# Patient Record
Sex: FEMALE | HISPANIC OR LATINO | Employment: UNEMPLOYED | URBAN - METROPOLITAN AREA
[De-identification: names, ages, dates, MRNs, and addresses within clinical notes are randomized per-mention and may not be internally consistent; named-entity substitution may affect disease eponyms.]

---

## 2017-07-18 ENCOUNTER — GYNECOLOGY VISIT (OUTPATIENT)
Dept: OBGYN | Facility: CLINIC | Age: 32
End: 2017-07-18
Payer: OTHER MISCELLANEOUS

## 2017-07-18 VITALS — BODY MASS INDEX: 26.58 KG/M2 | WEIGHT: 150 LBS | DIASTOLIC BLOOD PRESSURE: 62 MMHG | SYSTOLIC BLOOD PRESSURE: 90 MMHG

## 2017-07-18 DIAGNOSIS — R10.32 LEFT LOWER QUADRANT PAIN: ICD-10-CM

## 2017-07-18 DIAGNOSIS — N94.6 DYSMENORRHEA: ICD-10-CM

## 2017-07-18 PROCEDURE — 99203 OFFICE O/P NEW LOW 30 MIN: CPT | Mod: 25 | Performed by: OBSTETRICS & GYNECOLOGY

## 2017-07-18 PROCEDURE — 76830 TRANSVAGINAL US NON-OB: CPT | Performed by: OBSTETRICS & GYNECOLOGY

## 2017-07-18 NOTE — PROGRESS NOTES
Chief complaint; new patient    Poly Soto is a 32 y.o.  who presents complaining of one-year history of left lower quadrant pain sometimes sharp sometimes dull. 3 out of 10 on the pain scale. She also has pain during her periods but not necessarily worse. The patient denies pain during intercourse. She has history of previous  ×1    Past medical history negative  Past surgical history-negative      Review of systems; denies fever chills abdominal pain, denies chest pain shortness of breath or urinary symptoms  Past medical history-History reviewed. No pertinent past medical history.  Past surgical history-History reviewed. No pertinent past surgical history.  Allergies-Review of patient's allergies indicates no known allergies.  Medications-  Current Outpatient Prescriptions on File Prior to Visit   Medication Sig Dispense Refill   • polyethylene glycol 3350 (MIRALAX) Powder Take 17 g by mouth every day. 1 Bottle 0   • norethindrone (MICRONOR) 0.35 MG tablet Take 1 Tab by mouth every day. 1 Each 11   • docusate sodium (COLACE) 100 MG CAPS Take 100 mg by mouth 2 times a day. For constiparion      • ibuprofen (MOTRIN) 800 MG TABS Take 800 mg by mouth every 8 hours as needed for Mild Pain. For discomfort or cramping      • PRENATAL VITAMINS PO Take  by mouth.       No current facility-administered medications on file prior to visit.     Social history-  Social History     Social History   • Marital Status:      Spouse Name: N/A   • Number of Children: N/A   • Years of Education: N/A     Occupational History   • Not on file.     Social History Main Topics   • Smoking status: Never Smoker    • Smokeless tobacco: Not on file   • Alcohol Use: No   • Drug Use: No   • Sexual Activity:     Partners: Male     Other Topics Concern   • Not on file     Social History Narrative     Past Family History-no history of breast or ovarian cancer    Physical examination;  Alert and oriented x3  General a thin  well-developed well-nourished female in no apparent distress  Filed Vitals:    07/18/17 0918   BP: 90/62   Weight: 68.04 kg (150 lb)     Skin is warm and dry  Neck-supple  HEENT-head-atraumatic, normocephalic, EOMI, PERRLA  Cardiovascular-regular rate and rhythm, normal S1-S2, no murmurs or gallops  Lungs-clear to auscultation bilaterally  Back-negative CVA tenderness  Abdomen-nondistended positive bowel sounds soft nontender no masses or hepatosplenomegaly  Pelvic examination;  External genitalia-no visible lesions   Vagina-no blood or discharge  Cervix-no gross lesions  Uterus-normal size and shape, nontender  Adnexa without mass or tenderness  Extremities without cyanosis clubbing or edema  Neurologic exam grossly intact    Transvaginal ultrasound; as performed and read by myself; uterus measures 6.1 cm x 3.9 cm with 2.6 cm anterior fibroid no free pelvic fluid endometrial thickness 4.7 mm, normal-appearing bladder, right ovary measures 3.2 cm x 2.0 cm with small follicular cysts less than 1 cm, left ovary without masses    Impression;  Abdominal pain-left lower quadrant pain-no obvious gynecologic pathology    Plan;  Reassurance  Refer back to primary care physician for extended workup including GI etiology for her pain  Questions answered in detail    35  Minutes spent with the patient, 5 minutes spent performing ultrasound greater than 50% of the time spent on counseling and coordination of care. All questions answered in detail.

## 2017-07-18 NOTE — MR AVS SNAPSHOT
Poly Soto   2017 9:30 AM   Gynecology Visit   MRN: [id number]    Department:  [facility]   Dept Phone:  [phone]    Description:  Female : 1985   Provider:  Cooper Avila M.D.           Allergies as of 2017     No Known Allergies      You were diagnosed with     Left lower quadrant pain   [385194]       Dysmenorrhea   [625.3.ICD-9-CM]         Vital Signs     Blood Pressure Weight Last Menstrual Period             90/62 mmHg 68.04 kg (150 lb) 2017         Basic Information     Date Of Birth Sex Race Ethnicity Preferred Language    1985 Female  or   Origin (Guatemalan,Syrian,Moldovan,Hungarian, etc) Guatemalan      Problem List              ICD-10-CM Priority Class Noted - Resolved    Not immune to rubella Z78.9   2010 - Present    Supervision of normal first pregnancy Z34.00   2010 - Present    Threatened  labor O47.00   2010 - Present    Postpartum care and examination    2010 - Present      Health Maintenance     Patient has no pending health maintenance at this time      Current Immunizations     MMR Vaccine 2010  9:33 AM      Below and/or attached are the medications your provider expects you to take. Review all of your home medications and newly ordered medications with your provider and/or pharmacist. Follow medication instructions as directed by your provider and/or pharmacist. Please keep your medication list with you and share with your provider. Update the information when medications are discontinued, doses are changed, or new medications (including over-the-counter products) are added; and carry medication information at all times in the event of emergency situations     Allergies:  No Known Allergies          Medications  Valid as of: 2017 -  9:40 AM    Generic Name Brand Name Tablet Size Instructions for use    Docusate Sodium (Cap) COLACE 100 MG Take 100 mg by mouth 2 times a day. For  constiparion         Ibuprofen (Tab) MOTRIN 800 MG Take 800 mg by mouth every 8 hours as needed for Mild Pain. For discomfort or cramping         Norethindrone (Tab) MICRONOR 0.35 MG Take 1 Tab by mouth every day.        Polyethylene Glycol 3350 (Powder) MIRALAX  Take 17 g by mouth every day.        Prenatal Multivit-Min-Fe-FA   Take  by mouth.        .                 Medicines prescribed today were sent to:     [facility] [address] [place] [address] - [address] [id number] [facility] [address]    Phone: [phone] Fax: [phone]    Open 24 Hours?: No      Medication refill instructions:       If your prescription bottle indicates you have medication refills left, it is not necessary to call your provider’s office. Please contact your pharmacy and they will refill your medication.    If your prescription bottle indicates you do not have any refills left, you may request refills at any time through one of the following ways: The online [employer] system (except Urgent Care), by calling your provider’s office, or by asking your pharmacy to contact your provider’s office with a refill request. Medication refills are processed only during regular business hours and may not be available until the next business day. Your provider may request additional information or to have a follow-up visit with you prior to refilling your medication.   *Please Note: Medication refills are assigned a new Rx number when refilled electronically. Your pharmacy may indicate that no refills were authorized even though a new prescription for the same medication is available at the pharmacy. Please request the medicine by name with the pharmacy before contacting your provider for a refill.           [facility] Status: Patient Declined

## 2020-12-11 ENCOUNTER — HOSPITAL ENCOUNTER (OUTPATIENT)
Dept: RADIOLOGY | Facility: MEDICAL CENTER | Age: 35
End: 2020-12-11
Attending: PHYSICIAN ASSISTANT

## 2020-12-11 DIAGNOSIS — R10.2 PELVIC PAIN: ICD-10-CM

## 2021-01-27 ENCOUNTER — HOSPITAL ENCOUNTER (OUTPATIENT)
Dept: RADIOLOGY | Facility: MEDICAL CENTER | Age: 36
End: 2021-01-27
Attending: PHYSICIAN ASSISTANT

## 2021-01-27 DIAGNOSIS — N83.202 CYST OF LEFT OVARY: ICD-10-CM

## 2021-03-23 ENCOUNTER — HOSPITAL ENCOUNTER (EMERGENCY)
Facility: MEDICAL CENTER | Age: 36
End: 2021-03-24
Attending: EMERGENCY MEDICINE

## 2021-03-23 DIAGNOSIS — R10.2 PELVIC PAIN: ICD-10-CM

## 2021-03-23 DIAGNOSIS — R10.30 LOWER ABDOMINAL PAIN: ICD-10-CM

## 2021-03-23 LAB
ALBUMIN SERPL BCP-MCNC: 4.5 G/DL (ref 3.2–4.9)
ALBUMIN/GLOB SERPL: 1.4 G/DL
ALP SERPL-CCNC: 65 U/L (ref 30–99)
ALT SERPL-CCNC: 19 U/L (ref 2–50)
ANION GAP SERPL CALC-SCNC: 11 MMOL/L (ref 7–16)
AST SERPL-CCNC: 16 U/L (ref 12–45)
BASOPHILS # BLD AUTO: 0.5 % (ref 0–1.8)
BASOPHILS # BLD: 0.04 K/UL (ref 0–0.12)
BILIRUB SERPL-MCNC: <0.2 MG/DL (ref 0.1–1.5)
BUN SERPL-MCNC: 11 MG/DL (ref 8–22)
CALCIUM SERPL-MCNC: 9.7 MG/DL (ref 8.5–10.5)
CHLORIDE SERPL-SCNC: 103 MMOL/L (ref 96–112)
CO2 SERPL-SCNC: 24 MMOL/L (ref 20–33)
CREAT SERPL-MCNC: 0.74 MG/DL (ref 0.5–1.4)
EOSINOPHIL # BLD AUTO: 0.11 K/UL (ref 0–0.51)
EOSINOPHIL NFR BLD: 1.3 % (ref 0–6.9)
ERYTHROCYTE [DISTWIDTH] IN BLOOD BY AUTOMATED COUNT: 40.1 FL (ref 35.9–50)
GLOBULIN SER CALC-MCNC: 3.2 G/DL (ref 1.9–3.5)
GLUCOSE SERPL-MCNC: 147 MG/DL (ref 65–99)
HCG SERPL QL: NEGATIVE
HCT VFR BLD AUTO: 41.2 % (ref 37–47)
HGB BLD-MCNC: 14.2 G/DL (ref 12–16)
IMM GRANULOCYTES # BLD AUTO: 0.04 K/UL (ref 0–0.11)
IMM GRANULOCYTES NFR BLD AUTO: 0.5 % (ref 0–0.9)
LIPASE SERPL-CCNC: 35 U/L (ref 11–82)
LYMPHOCYTES # BLD AUTO: 2.79 K/UL (ref 1–4.8)
LYMPHOCYTES NFR BLD: 33.7 % (ref 22–41)
MCH RBC QN AUTO: 31.9 PG (ref 27–33)
MCHC RBC AUTO-ENTMCNC: 34.5 G/DL (ref 33.6–35)
MCV RBC AUTO: 92.6 FL (ref 81.4–97.8)
MONOCYTES # BLD AUTO: 0.68 K/UL (ref 0–0.85)
MONOCYTES NFR BLD AUTO: 8.2 % (ref 0–13.4)
NEUTROPHILS # BLD AUTO: 4.62 K/UL (ref 2–7.15)
NEUTROPHILS NFR BLD: 55.8 % (ref 44–72)
NRBC # BLD AUTO: 0 K/UL
NRBC BLD-RTO: 0 /100 WBC
PLATELET # BLD AUTO: 318 K/UL (ref 164–446)
PMV BLD AUTO: 10 FL (ref 9–12.9)
POTASSIUM SERPL-SCNC: 3.6 MMOL/L (ref 3.6–5.5)
PROT SERPL-MCNC: 7.7 G/DL (ref 6–8.2)
RBC # BLD AUTO: 4.45 M/UL (ref 4.2–5.4)
SODIUM SERPL-SCNC: 138 MMOL/L (ref 135–145)
WBC # BLD AUTO: 8.3 K/UL (ref 4.8–10.8)

## 2021-03-23 PROCEDURE — 99284 EMERGENCY DEPT VISIT MOD MDM: CPT

## 2021-03-23 PROCEDURE — 80053 COMPREHEN METABOLIC PANEL: CPT

## 2021-03-23 PROCEDURE — 36415 COLL VENOUS BLD VENIPUNCTURE: CPT

## 2021-03-23 PROCEDURE — 83690 ASSAY OF LIPASE: CPT

## 2021-03-23 PROCEDURE — 85025 COMPLETE CBC W/AUTO DIFF WBC: CPT

## 2021-03-23 PROCEDURE — 84703 CHORIONIC GONADOTROPIN ASSAY: CPT

## 2021-03-24 ENCOUNTER — APPOINTMENT (OUTPATIENT)
Dept: RADIOLOGY | Facility: MEDICAL CENTER | Age: 36
End: 2021-03-24
Attending: EMERGENCY MEDICINE

## 2021-03-24 VITALS
TEMPERATURE: 97.7 F | SYSTOLIC BLOOD PRESSURE: 102 MMHG | WEIGHT: 150 LBS | OXYGEN SATURATION: 99 % | DIASTOLIC BLOOD PRESSURE: 61 MMHG | HEIGHT: 63 IN | BODY MASS INDEX: 26.58 KG/M2 | RESPIRATION RATE: 14 BRPM | HEART RATE: 76 BPM

## 2021-03-24 LAB
APPEARANCE UR: CLEAR
BILIRUB UR QL STRIP.AUTO: NEGATIVE
COLOR UR: YELLOW
GLUCOSE UR STRIP.AUTO-MCNC: NEGATIVE MG/DL
KETONES UR STRIP.AUTO-MCNC: ABNORMAL MG/DL
LEUKOCYTE ESTERASE UR QL STRIP.AUTO: NEGATIVE
MICRO URNS: ABNORMAL
NITRITE UR QL STRIP.AUTO: NEGATIVE
PH UR STRIP.AUTO: 6.5 [PH] (ref 5–8)
PROT UR QL STRIP: NEGATIVE MG/DL
RBC UR QL AUTO: NEGATIVE
SP GR UR STRIP.AUTO: 1.02
UROBILINOGEN UR STRIP.AUTO-MCNC: 1 MG/DL

## 2021-03-24 PROCEDURE — 81003 URINALYSIS AUTO W/O SCOPE: CPT

## 2021-03-24 PROCEDURE — 76856 US EXAM PELVIC COMPLETE: CPT

## 2021-03-24 NOTE — ED NOTES
Pt continues to wait for U/S.  This RN apologized for wait to pt.  LEft VM for U/S requesting update about procedure time.

## 2021-03-24 NOTE — ED TRIAGE NOTES
Chief Complaint   Patient presents with   • Abdominal Pain     Pt walked into triage with a steady c/o LLQ pain after having a bowel movement approx 30 mins prior to arrival, denies N/V/D    Pt & staff masked and in appropriate PPE during encounter.  Pt denies fever/travel or being in contact with anyone testing positive for Covid.  Explained pt the triage process, made pt aware to tell the RN/staff of any changes/concerns, pt verbalized understanding of process and instructions given.  Pt to ER lobby.

## 2021-03-24 NOTE — ED NOTES
Pt is resting, currently say pain is tolerable.  Updated pt about plan for U/S.  Awaiting lab results at this time.

## 2021-03-24 NOTE — ED NOTES
Showed pt to room. Pt walked with steady gait. Pt was gowned, connected to cardiac monitor, Spo2, and BP.

## 2021-03-24 NOTE — ED PROVIDER NOTES
ED Provider Note    CHIEF COMPLAINT  Chief Complaint   Patient presents with   • Abdominal Pain      services were used in the patient's primary language of Malay.     Name or Number: [id number]  Mode of interpretation: iPad    Content of Interpretation: as follows.      HPI  Poly Soto is a 36 y.o. female who presents suprapubic and left lower quadrant pain that started suddenly this evening.  She states that the pain radiates down her leg on the left.  No bulging in the groin region.  No skin changes.  No associated vomiting.  No dysuria or hematuria.  No vaginal bleeding or discharge.  No bloody stool or black stool.  Denies prior abdominal surgeries in the past.  No fever.  No chest pain or trouble breathing..    I was able to review patient's prior imaging studies including multiple ultrasound of the pelvis.  Has history of ovarian cysts in the past.    Last menstrual cycle was on the 15th.  She tends to have cycles lasting 2 or 3 days with light bleeding though significant pain.  She denies taking birth control pills currently.    REVIEW OF SYSTEMS  See HPI for further details. All other systems are negative.     PAST MEDICAL HISTORY       SOCIAL HISTORY  Social History     Tobacco Use   • Smoking status: Never Smoker   Substance and Sexual Activity   • Alcohol use: No   • Drug use: No   • Sexual activity: Yes     Partners: Male       SURGICAL HISTORY  patient denies any surgical history    CURRENT MEDICATIONS  Home Medications     Reviewed by Fernanda Banks R.N. (Registered Nurse) on 03/23/21 at 2044  Med List Status: Not Addressed   Medication Last Dose Status   docusate sodium (COLACE) 100 MG CAPS  Active   ibuprofen (MOTRIN) 800 MG TABS  Active   norethindrone (MICRONOR) 0.35 MG tablet  Active   polyethylene glycol 3350 (MIRALAX) Powder  Active   PRENATAL VITAMINS PO  Active                ALLERGIES  No Known Allergies    PHYSICAL EXAM  VITAL SIGNS: /70   Pulse 80    "Temp 36.2 °C (97.2 °F) (Temporal)   Resp 14   Ht 1.6 m (5' 3\")   Wt 68 kg (150 lb)   SpO2 (!) 80%   BMI 26.57 kg/m²   Pulse ox interpretation: I interpret this pulse ox as normal.  Constitutional: Alert in no apparent distress.  HENT: No signs of trauma, Bilateral external ears normal, Nose normal.   Eyes: Pupils are equal and reactive, Conjunctiva normal, Non-icteric.   Neck: Normal range of motion, No tenderness, Supple, No stridor.   Cardiovascular: Regular rate and rhythm.   Thorax & Lungs: Normal breath sounds, No respiratory distress, No wheezing, No chest tenderness.   Abdomen: Bowel sounds normal, Soft, faint suprapubic tenderness, No masses, No pulsatile masses. No peritoneal signs.  No inguinal masses or hernias.  Skin: Warm, Dry, No erythema, No rash.   Back: No bony tenderness, No CVA tenderness.   Extremities: Intact distal pulses, No edema, No tenderness, No cyanosis  Neurologic: Alert, No focal deficits noted.       DIAGNOSTIC STUDIES / PROCEDURES      LABS  Labs Reviewed   COMP METABOLIC PANEL - Abnormal; Notable for the following components:       Result Value    Glucose 147 (*)     All other components within normal limits   URINALYSIS,CULTURE IF INDICATED - Abnormal; Notable for the following components:    Ketones Trace (*)     All other components within normal limits    Narrative:     Indication for culture:->Patient WITHOUT an indwelling Atkins  catheter in place with new onset of Dysuria, Frequency,  Urgency, and/or Suprapubic pain   CBC WITH DIFFERENTIAL   LIPASE   HCG QUAL SERUM   ESTIMATED GFR         RADIOLOGY  US-PELVIC COMPLETE (TRANSABDOMINAL/TRANSVAGINAL) (COMBO)   Final Result      1.  Several uterine fibroids measuring up to 1.9 cm. No submucosal fibroids are detected.   2.  Normal endometrial thickness.   3.  Normal ovaries.   4.  No pelvic free fluid.            COURSE & MEDICAL DECISION MAKING    Medications - No data to display    Pertinent Labs & Imaging studies reviewed. " (See chart for details)  36 y.o. female presenting with left lower quadrant and suprapubic pain starting suddenly this evening.  No associated vomiting.  No fevers.  No bulges to the region to suggest hernia.  No skin changes.  No vaginal bleeding or discharge.  The patient is not pregnant.  No diarrhea or bloody or black stool.  Low suspicion for diverticulitis.  Given the patient's prior history of ovarian cysts, ultrasound pelvis was ordered for further evaluation along with laboratory studies.    Laboratory studies were largely unremarkable.  No leukocytosis.  No significant metabolic abnormalities.  No evidence of urinary tract infection/hematuria.  Low suspicion for nephrolithiasis causing the patient's left lower quadrant pain/suprapubic pain.    Pelvic ultrasound was performed.  Several uterine fibroids were identified which may result in abnormal pelvic pain.  No free pelvic fluid.  No evidence of ovarian cysts or torsion.    Upon reevaluation, the patient states that her pain is much improved despite the fact that she did not receive analgesic medications here in the emergency department.  Hemodynamically stable.  All results were reviewed with the patient using a  service.  Laboratory studies are unremarkable.  Ultrasound showing uterine fibroids which may cause pain.  No evidence of ovarian cysts.  With suspect free fluid in the abdomen if there was a ruptured ovarian cyst.  No evidence of hernia such as inguinal/femoral hernia at this time.    Patient appears stable for discharge.  Recommending outpatient follow-up with a primary care physician and gynecologist for further work-up of the patient's abdominal discomfort.  To return immediately for any worsening of her symptoms or development of any other concerning signs or symptoms.  In the meantime, recommending acetaminophen and/or ibuprofen for continued pain management over the next few days.    The patient was instructed to follow-up with  "primary care physician for further management.  To return immediately for any worsening symptoms or development of any other concerning signs or symptoms. The patient verbalizes understanding in their own words.    /61   Pulse 76   Temp 36.5 °C (97.7 °F) (Temporal)   Resp 14   Ht 1.6 m (5' 3\")   Wt 68 kg (150 lb)   SpO2 99%   BMI 26.57 kg/m²     The patient was referred to primary care where they will receive further BP management.      [facility], Emergency Dept  [address] [place] [id number] [phone]    As needed, If symptoms worsen    Primary care doctor    Schedule an appointment as soon as possible for a visit         FINAL IMPRESSION  1. Pelvic pain    2. Lower abdominal pain            Electronically signed by: Hamlet Coates M.D., 3/23/2021 10:47 PM    "

## 2021-03-24 NOTE — ED NOTES
Pt resting comfortably, states pain is tolerable.  Apologized for wait times.  Awaiting U/S result to be completed.

## 2023-03-13 ENCOUNTER — GYNECOLOGY VISIT (OUTPATIENT)
Dept: OBGYN | Facility: CLINIC | Age: 38
End: 2023-03-13
Payer: COMMERCIAL

## 2023-03-13 VITALS
DIASTOLIC BLOOD PRESSURE: 70 MMHG | SYSTOLIC BLOOD PRESSURE: 104 MMHG | BODY MASS INDEX: 28.84 KG/M2 | HEIGHT: 63 IN | WEIGHT: 162.8 LBS

## 2023-03-13 DIAGNOSIS — R10.2 PELVIC PAIN: ICD-10-CM

## 2023-03-13 PROCEDURE — 99203 OFFICE O/P NEW LOW 30 MIN: CPT | Performed by: OBSTETRICS & GYNECOLOGY

## 2023-03-13 ASSESSMENT — FIBROSIS 4 INDEX: FIB4 SCORE: 0.43

## 2023-03-13 NOTE — PROGRESS NOTES
GYN Consult    CC/reason for consult: pelvic pain    HPI: Poly Soto is a 38 y.o.  with pelvic pain. States her pain is suprapubic and wraps around her right side. States the pain is worse after her period. She is currently on an OCP. States the birth control helped with her pain for a while but it is no longer working. No known family hx of endometriosis.     ROS:  Constitutional: No fever, weight loss, weight gain, or fatigue  Skin/breast: No breast lump, nipple discharge, acne  Cardiovascular: No chest pain, leg swelling, palpitations  Respiratory: No shortness of breath, wheezing, or cough  Genitourinary: No pelvic pain, vaginal discharge, painful urination, abnormal periods, involuntary loss of urine, or vaginal bulge.  GI: No diarrhea, constipation, blood in stool, vomiting, abdominal pain  Endocrine: No hot flashes, abnormal thirst  Psychiatric: No depression, anxiety, or thoughts of self-harm  Neurologic: No headaches or seizures  Heme/lymph: No enlarged lymph nodes, denies bruising/bleeding that will not stop  Allergic: Denies allergies  MSK: Denies muscle weakness        GYN History:  No h/o abnormal pap, nor history of cone biopsy, LEEP or any other cervical, uterine or gynecologic surgery. States she had a pap with [facility] in .  No history of sexually transmitted diseases.       OB history:    OB History    Para Term  AB Living   1 1 0 1   1   SAB IAB Ectopic Molar Multiple Live Births             1      # Outcome Date GA Lbr Octavio/2nd Weight Sex Delivery Anes PTL Lv   1  06/05/10 35w0d  4 lb 15 oz F Vag-Spont None Y ATILIO      Birth Comments: BABY STILL IN NICU.       History reviewed. No pertinent past medical history.    History reviewed. No pertinent surgical history.    Medications:   Current Outpatient Medications Ordered in Epic   Medication Sig Dispense Refill    polyethylene glycol 3350 (MIRALAX) Powder Take 17 g by mouth every day. 1 Bottle 0    norethindrone  "(MICRONOR) 0.35 MG tablet Take 1 Tab by mouth every day. 1 Each 11    docusate sodium (COLACE) 100 MG CAPS Take 100 mg by mouth 2 times a day. For constiparion       ibuprofen (MOTRIN) 800 MG TABS Take 800 mg by mouth every 8 hours as needed for Mild Pain. For discomfort or cramping       PRENATAL VITAMINS PO Take  by mouth.       No current Epic-ordered facility-administered medications on file.       Allergies: Patient has no known allergies.    Social History     Socioeconomic History    Marital status:    Tobacco Use    Smoking status: Never    Smokeless tobacco: Never   Vaping Use    Vaping Use: Never used   Substance and Sexual Activity    Alcohol use: No    Drug use: No    Sexual activity: Yes     Partners: Male     Birth control/protection: Pill     Comment: .       History reviewed. No pertinent family history.  Denies hx of GI/GYN/breast cancers- not that she knows of     Physical Exam:  /70 (BP Location: Right arm, Patient Position: Sitting, BP Cuff Size: Adult)   Ht 5' 3\"   Wt 162 lb 12.8 oz   BMI 28.84 kg/m²   gen: AAO, NAD, affect appropriate  CV: RRR; no LE edema  Resp: Symmetric non labored breathing, CTAB  Abd: soft, NT, ND, no masses, no organomegaly, no hernias  : NEFG, normal urethral meatus, normal anus/perineum, normal vagina and cervix.  Uterus midline, anteverted, no adnexal masses/tenderness  Skin: warm/dry, no lesions    A/P: 38 y.o.  with   1. Pelvic pain  US-PELVIC COMPLETE (TRANSABDOMINAL/TRANSVAGINAL) (COMBO)        Suspicion for endometriosis. Ultrasound to rule out ovarian cyst. Consider stronger progesterone only option for pain (Nexplanon or Depo) if financial situation is restrictive and diagnostic laparoscopy is not feasible.     Dr. Victoria     "

## 2023-03-13 NOTE — PROGRESS NOTES
ID#[id number], Naila   ID#[id number], Maggy    Pt here as New Pt for Gyn exam  Confirmed ph#, drug allergies, medications, and pharmacy on file.  Last Pap:2021, neg per Pt.  Abnormal Pap:Never  BCM:Pills  LMP:2/20/2023  Pt states would like to establish care.  Pt states would like to discuss lower pelvic px on both sides x 7-8 yrs for almost qd.

## 2023-03-20 ENCOUNTER — HOSPITAL ENCOUNTER (EMERGENCY)
Facility: MEDICAL CENTER | Age: 38
End: 2023-03-20
Attending: EMERGENCY MEDICINE
Payer: COMMERCIAL

## 2023-03-20 VITALS
DIASTOLIC BLOOD PRESSURE: 61 MMHG | SYSTOLIC BLOOD PRESSURE: 100 MMHG | HEART RATE: 73 BPM | TEMPERATURE: 97.6 F | WEIGHT: 160.05 LBS | BODY MASS INDEX: 28.36 KG/M2 | OXYGEN SATURATION: 98 % | HEIGHT: 63 IN | RESPIRATION RATE: 17 BRPM

## 2023-03-20 DIAGNOSIS — K52.9 GASTROENTERITIS: Primary | ICD-10-CM

## 2023-03-20 DIAGNOSIS — K21.9 GASTROESOPHAGEAL REFLUX DISEASE WITHOUT ESOPHAGITIS: ICD-10-CM

## 2023-03-20 LAB
ALBUMIN SERPL BCP-MCNC: 4.2 G/DL (ref 3.2–4.9)
ALBUMIN/GLOB SERPL: 1.3 G/DL
ALP SERPL-CCNC: 40 U/L (ref 30–99)
ALT SERPL-CCNC: 28 U/L (ref 2–50)
ANION GAP SERPL CALC-SCNC: 12 MMOL/L (ref 7–16)
APPEARANCE UR: CLEAR
AST SERPL-CCNC: 16 U/L (ref 12–45)
BACTERIA #/AREA URNS HPF: ABNORMAL /HPF
BASOPHILS # BLD AUTO: 0.5 % (ref 0–1.8)
BASOPHILS # BLD: 0.04 K/UL (ref 0–0.12)
BILIRUB SERPL-MCNC: 0.3 MG/DL (ref 0.1–1.5)
BILIRUB UR QL STRIP.AUTO: NEGATIVE
BUN SERPL-MCNC: 8 MG/DL (ref 8–22)
CALCIUM ALBUM COR SERPL-MCNC: 8.9 MG/DL (ref 8.5–10.5)
CALCIUM SERPL-MCNC: 9.1 MG/DL (ref 8.5–10.5)
CHLORIDE SERPL-SCNC: 102 MMOL/L (ref 96–112)
CO2 SERPL-SCNC: 20 MMOL/L (ref 20–33)
COLOR UR: YELLOW
CREAT SERPL-MCNC: 0.81 MG/DL (ref 0.5–1.4)
EOSINOPHIL # BLD AUTO: 0.16 K/UL (ref 0–0.51)
EOSINOPHIL NFR BLD: 1.9 % (ref 0–6.9)
EPI CELLS #/AREA URNS HPF: ABNORMAL /HPF
ERYTHROCYTE [DISTWIDTH] IN BLOOD BY AUTOMATED COUNT: 38.9 FL (ref 35.9–50)
GFR SERPLBLD CREATININE-BSD FMLA CKD-EPI: 96 ML/MIN/1.73 M 2
GLOBULIN SER CALC-MCNC: 3.3 G/DL (ref 1.9–3.5)
GLUCOSE SERPL-MCNC: 93 MG/DL (ref 65–99)
GLUCOSE UR STRIP.AUTO-MCNC: NEGATIVE MG/DL
HCG SERPL QL: NEGATIVE
HCT VFR BLD AUTO: 39.8 % (ref 37–47)
HGB BLD-MCNC: 14.3 G/DL (ref 12–16)
HYALINE CASTS #/AREA URNS LPF: ABNORMAL /LPF
IMM GRANULOCYTES # BLD AUTO: 0.02 K/UL (ref 0–0.11)
IMM GRANULOCYTES NFR BLD AUTO: 0.2 % (ref 0–0.9)
KETONES UR STRIP.AUTO-MCNC: NEGATIVE MG/DL
LEUKOCYTE ESTERASE UR QL STRIP.AUTO: ABNORMAL
LIPASE SERPL-CCNC: 68 U/L (ref 11–82)
LYMPHOCYTES # BLD AUTO: 3.53 K/UL (ref 1–4.8)
LYMPHOCYTES NFR BLD: 41.9 % (ref 22–41)
MCH RBC QN AUTO: 32.6 PG (ref 27–33)
MCHC RBC AUTO-ENTMCNC: 35.9 G/DL (ref 33.6–35)
MCV RBC AUTO: 90.9 FL (ref 81.4–97.8)
MICRO URNS: ABNORMAL
MONOCYTES # BLD AUTO: 0.77 K/UL (ref 0–0.85)
MONOCYTES NFR BLD AUTO: 9.1 % (ref 0–13.4)
NEUTROPHILS # BLD AUTO: 3.91 K/UL (ref 2–7.15)
NEUTROPHILS NFR BLD: 46.4 % (ref 44–72)
NITRITE UR QL STRIP.AUTO: NEGATIVE
NRBC # BLD AUTO: 0 K/UL
NRBC BLD-RTO: 0 /100 WBC
PH UR STRIP.AUTO: 6 [PH] (ref 5–8)
PLATELET # BLD AUTO: 302 K/UL (ref 164–446)
PMV BLD AUTO: 10 FL (ref 9–12.9)
POTASSIUM SERPL-SCNC: 4.3 MMOL/L (ref 3.6–5.5)
PROT SERPL-MCNC: 7.5 G/DL (ref 6–8.2)
PROT UR QL STRIP: NEGATIVE MG/DL
RBC # BLD AUTO: 4.38 M/UL (ref 4.2–5.4)
RBC # URNS HPF: ABNORMAL /HPF
RBC UR QL AUTO: ABNORMAL
SODIUM SERPL-SCNC: 134 MMOL/L (ref 135–145)
SP GR UR STRIP.AUTO: 1
UROBILINOGEN UR STRIP.AUTO-MCNC: 0.2 MG/DL
WBC # BLD AUTO: 8.4 K/UL (ref 4.8–10.8)
WBC #/AREA URNS HPF: ABNORMAL /HPF

## 2023-03-20 PROCEDURE — 81001 URINALYSIS AUTO W/SCOPE: CPT

## 2023-03-20 PROCEDURE — 36415 COLL VENOUS BLD VENIPUNCTURE: CPT

## 2023-03-20 PROCEDURE — 80053 COMPREHEN METABOLIC PANEL: CPT

## 2023-03-20 PROCEDURE — 84703 CHORIONIC GONADOTROPIN ASSAY: CPT

## 2023-03-20 PROCEDURE — 700111 HCHG RX REV CODE 636 W/ 250 OVERRIDE (IP): Performed by: EMERGENCY MEDICINE

## 2023-03-20 PROCEDURE — A9270 NON-COVERED ITEM OR SERVICE: HCPCS | Performed by: EMERGENCY MEDICINE

## 2023-03-20 PROCEDURE — 700102 HCHG RX REV CODE 250 W/ 637 OVERRIDE(OP): Performed by: EMERGENCY MEDICINE

## 2023-03-20 PROCEDURE — 99284 EMERGENCY DEPT VISIT MOD MDM: CPT

## 2023-03-20 PROCEDURE — 83690 ASSAY OF LIPASE: CPT

## 2023-03-20 PROCEDURE — 85025 COMPLETE CBC W/AUTO DIFF WBC: CPT

## 2023-03-20 RX ORDER — FAMOTIDINE 20 MG/1
40 TABLET, FILM COATED ORAL ONCE
Status: COMPLETED | OUTPATIENT
Start: 2023-03-20 | End: 2023-03-20

## 2023-03-20 RX ORDER — ONDANSETRON 4 MG/1
4 TABLET, ORALLY DISINTEGRATING ORAL EVERY 6 HOURS PRN
Qty: 10 TABLET | Refills: 0 | Status: SHIPPED | OUTPATIENT
Start: 2023-03-20 | End: 2023-11-25

## 2023-03-20 RX ORDER — ONDANSETRON 4 MG/1
4 TABLET, ORALLY DISINTEGRATING ORAL ONCE
Status: COMPLETED | OUTPATIENT
Start: 2023-03-20 | End: 2023-03-20

## 2023-03-20 RX ORDER — LOPERAMIDE HYDROCHLORIDE 2 MG/1
2 CAPSULE ORAL 4 TIMES DAILY PRN
Qty: 12 CAPSULE | Refills: 0 | Status: SHIPPED | OUTPATIENT
Start: 2023-03-20 | End: 2023-11-25

## 2023-03-20 RX ADMIN — FAMOTIDINE 40 MG: 20 TABLET, FILM COATED ORAL at 22:48

## 2023-03-20 RX ADMIN — ONDANSETRON 4 MG: 4 TABLET, ORALLY DISINTEGRATING ORAL at 23:11

## 2023-03-20 RX ADMIN — LIDOCAINE HYDROCHLORIDE 30 ML: 20 SOLUTION OROPHARYNGEAL at 22:49

## 2023-03-20 ASSESSMENT — FIBROSIS 4 INDEX: FIB4 SCORE: 0.43

## 2023-03-21 NOTE — DISCHARGE INSTRUCTIONS
You need to call your doctor to make an appointment to be seen for further evaluation.  I have sent medications to the pharmacy for you to  including diarrhea medication and nausea medication.  If you have any further concerns or symptoms, please return to the ED.  Thank you for coming in today.

## 2023-03-21 NOTE — ED NOTES
PT ambulated to room without difficulty. Changed into gown and placed on monitor. Chart up for ERP.  at bedside.

## 2023-03-21 NOTE — ED PROVIDER NOTES
ED Provider Note    Scribed for Martín Howard by Christopher Patterson. 3/20/2023  10:36 PM    Primary care provider: Pcp Pt States None  Means of arrival: Walk in  History obtained from: Patient  History limited by: Language    CHIEF COMPLAINT  Chief Complaint   Patient presents with    Abdominal Pain     LLQ & LRQ abdominal pain x2 weeks. Patient states she has also had an increase in acid reflux and gas.     Nausea     X 2 weeks      EXTERNAL RECORDS REVIEWED  Outpatient Notes The patient was seen on 3/13/23 for pelvic pain by OB/GYN with suspicion of endometriosis.    HPI/ROS  LIMITATION TO HISTORY   Select: Language Serbian,  Used  Family interpreted as per her preference  OUTSIDE HISTORIAN(S):  Family Gave additional history and interpreted    HPI  Poly Soto is a 38 y.o. female who presents to the Emergency Department for evaluation of abdominal pain onset approximately 2 weeks ago. The patient states that she suddenly began experiencing abdominal pain for the past 2 week ago without any specific trigger. She localizes it diffusely across her abdomen. Additionally, she notes experiencing nausea and diarrhea simultaneously with her abdominal pain. The patient was ultimately prompted to visit the ED for further evaluation. Associated symptoms include nausea, diarrhea, acidic taste, and increased gas. The patient denies any vomiting, dysuria, hematuria, hematochezia, abnormal vaginal discharge, or recent travel. She has been medicating with Lansoprazole, Omeprazole, and Tums with minimal alleviation to her symptoms. Her abdominal pain is exacerbated by PO intake. She notes having issues with acid reflux in the past for which she has been evaluated by her PCP.    REVIEW OF SYSTEMS  As above, all other systems reviewed and are negative.   See HPI for further details.     PAST MEDICAL HISTORY     SURGICAL HISTORY  patient denies any surgical history  SOCIAL HISTORY  Social History     Tobacco Use  "   Smoking status: Never    Smokeless tobacco: Never   Vaping Use    Vaping Use: Never used   Substance Use Topics    Alcohol use: No    Drug use: No      Social History     Substance and Sexual Activity   Drug Use No     FAMILY HISTORY  History reviewed. No pertinent family history.  CURRENT MEDICATIONS  Home Medications       Reviewed by Radha Gayle R.N. (Registered Nurse) on 03/20/23 at 2101  Med List Status: Partial     Medication Last Dose Status   docusate sodium (COLACE) 100 MG CAPS  Active   ibuprofen (MOTRIN) 800 MG TABS  Active   norethindrone (MICRONOR) 0.35 MG tablet  Active   polyethylene glycol 3350 (MIRALAX) Powder  Active   PRENATAL VITAMINS PO  Active                  ALLERGIES  No Known Allergies    PHYSICAL EXAM    VITAL SIGNS:   Vitals:    03/20/23 2050 03/20/23 2053   BP: 96/60 104/68   Pulse: 87    Resp: 16    Temp: 36.1 °C (97 °F)    TempSrc: Temporal    SpO2: 99%    Weight: 72.6 kg (160 lb 0.9 oz)    Height: 1.6 m (5' 3\")        Vitals: My interpretation: normotensive, not tachycardic, afebrile, not hypoxic    Reinterpretation of vitals: Unchanged and unremarkable    PE:   Constitutional: Well developed, Well nourished, No acute distress, Non-toxic appearance.   HENT: Normocephalic, Atraumatic, Bilateral external ears normal, Oropharynx is clear mucous membranes are moist. No oral exudates or nasal discharge.   Eyes: Pupils are equal round and reactive, EOMI, Conjunctiva normal, No discharge.   Neck: Normal range of motion, No tenderness, Supple, No stridor. No meningismus.  Lymphatic: No lymphadenopathy noted.   Cardiovascular: Regular rate and rhythm without murmur rub or gallop.  Thorax & Lungs: Clear breath sounds bilaterally without wheezes, rhonchi or rales. There is no chest wall tenderness.   Abdomen: Soft non-tender non-distended. There is no rebound or guarding. No organomegaly is appreciated. Bowel sounds are normal.  Skin: Normal without rash.   Back: No CVA or spinal " tenderness.   Extremities: Intact distal pulses, No edema, No tenderness, No cyanosis, No clubbing. Capillary refill is less than 2 seconds.  Musculoskeletal: Good range of motion in all major joints. No tenderness to palpation or major deformities noted.   Neurologic: Alert & oriented x 3, Normal motor function, Normal sensory function, No focal deficits noted. Reflexes are normal.  Psychiatric: Affect normal, Judgment normal, Mood normal. There is no suicidal ideation or patient reported hallucinations.     DIAGNOSTIC STUDIES / PROCEDURES    LABS  Results for orders placed or performed during the hospital encounter of 03/20/23   CBC WITH DIFFERENTIAL   Result Value Ref Range    WBC 8.4 4.8 - 10.8 K/uL    RBC 4.38 4.20 - 5.40 M/uL    Hemoglobin 14.3 12.0 - 16.0 g/dL    Hematocrit 39.8 37.0 - 47.0 %    MCV 90.9 81.4 - 97.8 fL    MCH 32.6 27.0 - 33.0 pg    MCHC 35.9 (H) 33.6 - 35.0 g/dL    RDW 38.9 35.9 - 50.0 fL    Platelet Count 302 164 - 446 K/uL    MPV 10.0 9.0 - 12.9 fL    Neutrophils-Polys 46.40 44.00 - 72.00 %    Lymphocytes 41.90 (H) 22.00 - 41.00 %    Monocytes 9.10 0.00 - 13.40 %    Eosinophils 1.90 0.00 - 6.90 %    Basophils 0.50 0.00 - 1.80 %    Immature Granulocytes 0.20 0.00 - 0.90 %    Nucleated RBC 0.00 /100 WBC    Neutrophils (Absolute) 3.91 2.00 - 7.15 K/uL    Lymphs (Absolute) 3.53 1.00 - 4.80 K/uL    Monos (Absolute) 0.77 0.00 - 0.85 K/uL    Eos (Absolute) 0.16 0.00 - 0.51 K/uL    Baso (Absolute) 0.04 0.00 - 0.12 K/uL    Immature Granulocytes (abs) 0.02 0.00 - 0.11 K/uL    NRBC (Absolute) 0.00 K/uL   COMP METABOLIC PANEL   Result Value Ref Range    Sodium 134 (L) 135 - 145 mmol/L    Potassium 4.3 3.6 - 5.5 mmol/L    Chloride 102 96 - 112 mmol/L    Co2 20 20 - 33 mmol/L    Anion Gap 12.0 7.0 - 16.0    Glucose 93 65 - 99 mg/dL    Bun 8 8 - 22 mg/dL    Creatinine 0.81 0.50 - 1.40 mg/dL    Calcium 9.1 8.5 - 10.5 mg/dL    AST(SGOT) 16 12 - 45 U/L    ALT(SGPT) 28 2 - 50 U/L    Alkaline Phosphatase 40 30  - 99 U/L    Total Bilirubin 0.3 0.1 - 1.5 mg/dL    Albumin 4.2 3.2 - 4.9 g/dL    Total Protein 7.5 6.0 - 8.2 g/dL    Globulin 3.3 1.9 - 3.5 g/dL    A-G Ratio 1.3 g/dL   LIPASE   Result Value Ref Range    Lipase 68 11 - 82 U/L   HCG QUAL SERUM   Result Value Ref Range    Beta-Hcg Qualitative Serum Negative Negative   URINALYSIS,CULTURE IF INDICATED    Specimen: Urine, Clean Catch   Result Value Ref Range    Color Yellow     Character Clear     Specific Gravity 1.005 <1.035    Ph 6.0 5.0 - 8.0    Glucose Negative Negative mg/dL    Ketones Negative Negative mg/dL    Protein Negative Negative mg/dL    Bilirubin Negative Negative    Urobilinogen, Urine 0.2 Negative    Nitrite Negative Negative    Leukocyte Esterase Small (A) Negative    Occult Blood Large (A) Negative    Micro Urine Req Microscopic    URINE MICROSCOPIC (W/UA)   Result Value Ref Range    WBC 2-5 /hpf    RBC 10-20 (A) /hpf    Bacteria Moderate (A) None /hpf    Epithelial Cells Moderate (A) /hpf    Hyaline Cast 0-2 /lpf   CORRECTED CALCIUM   Result Value Ref Range    Correct Calcium 8.9 8.5 - 10.5 mg/dL   ESTIMATED GFR   Result Value Ref Range    GFR (CKD-EPI) 96 >60 mL/min/1.73 m 2      All labs reviewed by me. Labs were compared to prior labs if they were available. Significant for no leukocytosis, no anemia, normal electrolytes, normal renal function, normal liver enzymes, normal bilirubin, lipase normal, pregnancy negative, urinalysis is grossly contaminated with moderate epithelials and as patient is asymptomatic, will await culture regarding bacterial growth.    COURSE & MEDICAL DECISION MAKING  Nursing notes, VS, PMSFHx, labs, imaging, EKG reviewed in chart.     ED Observation Status? No; Patient does not meet criteria for ED Observation.     Ddx: GERD, gastroenteritis, diarrheal illness, appendicitis, diverticulitis, constipation    MDM: 10:36 PM Poly Soto is a 38 y.o. female who presented with 2 weeks of some mild generalized abdominal  discomfort, left lower quadrant is greatest, as well as some nausea with esophageal irritated symptoms, and nonbloody diarrhea.  She seen her doctor was started on Protonix for this without significant improvement.  Vital signs are unremarkable she denies history of fever.  No sick contacts.  Upon arrival here she is very well-appearing, ambulatory, tolerating oral intake.  Her abdomen is with generalized subjective discomfort but no rebound or guarding and no point tenderness in any quadrant other than mild tenderness in the left lower quadrant.  Patient states she has had irregular menstruation periods. All labs reviewed by me. Labs were compared to prior labs if they were available. Significant for no leukocytosis, no anemia, normal electrolytes, normal renal function, normal liver enzymes, normal bilirubin, lipase normal, pregnancy negative, urinalysis is grossly contaminated with moderate epithelials and as patient is asymptomatic, will await culture regarding bacterial growth.  At this time I suspect likely patient suffering from gastroenteritis illness, likely viral in nature as she has no white count and no fever.  She is very well-appearing and stable for further outpatient follow-up for further diagnosis of possible delayed gastric emptying syndrome etc.  Will Rx for symptomatic treatment of loperamide for diarrhea, Zofran for nausea, and continue her antacid.  She verbalized understanding strict return precautions and close outpatient follow-up.    ADDITIONAL PROBLEM LIST AND DISPOSITION    Escalation of care considered, and ultimately not performed:diagnostic imaging    Barriers to care at this time, including but not limited to: The patient only speaks Estonian.     Decision tools and prescription drugs considered including, but not limited to: Pain Medications loperamide, Zofran for nausea .    FINAL IMPRESSION  1. Gastroenteritis Acute   2. Gastroesophageal reflux disease without esophagitis Acute        I, Christopher Patterson (Scribe), am scribing for, and in the presence of, Martín Howard.    Electronically signed by: Christopher Patterson (Scribe), 3/20/2023    I, Martín Howard personally performed the services described in this documentation, as scribed by Christopher Patterson in my presence, and it is both accurate and complete.    The note accurately reflects work and decisions made by me.  Martín Howard  3/20/2023  10:52 PM

## 2023-03-21 NOTE — ED TRIAGE NOTES
Chief Complaint   Patient presents with    Abdominal Pain     LLQ & LRQ abdominal pain x2 weeks. Patient states she has also had an increase in acid reflux and gas.     Nausea     X 2 weeks       Patient ambulatory to triage. A&O x4, speaking in full sentences. Patient educated on triage process and encouraged to notify staff if condition worsens.

## 2023-03-21 NOTE — ED NOTES
PT discussed discharge with ERP. PT states no further questions. PT ambulated to lobby without difficulty.

## 2023-03-29 ENCOUNTER — HOSPITAL ENCOUNTER (OUTPATIENT)
Dept: RADIOLOGY | Facility: MEDICAL CENTER | Age: 38
End: 2023-03-29
Attending: OBSTETRICS & GYNECOLOGY
Payer: COMMERCIAL

## 2023-03-29 ENCOUNTER — HOSPITAL ENCOUNTER (OUTPATIENT)
Dept: RADIOLOGY | Facility: MEDICAL CENTER | Age: 38
End: 2023-03-29
Attending: FAMILY MEDICINE

## 2023-03-29 DIAGNOSIS — R10.84 DIFFUSE ABDOMINAL PAIN: ICD-10-CM

## 2023-03-29 DIAGNOSIS — R10.2 PELVIC PAIN: ICD-10-CM

## 2023-03-29 PROCEDURE — 76830 TRANSVAGINAL US NON-OB: CPT

## 2023-04-26 ENCOUNTER — GYNECOLOGY VISIT (OUTPATIENT)
Dept: OBGYN | Facility: CLINIC | Age: 38
End: 2023-04-26
Payer: COMMERCIAL

## 2023-04-26 VITALS — SYSTOLIC BLOOD PRESSURE: 100 MMHG | BODY MASS INDEX: 26.22 KG/M2 | WEIGHT: 148 LBS | DIASTOLIC BLOOD PRESSURE: 62 MMHG

## 2023-04-26 DIAGNOSIS — D25.9 UTERINE LEIOMYOMA, UNSPECIFIED LOCATION: ICD-10-CM

## 2023-04-26 PROCEDURE — 99213 OFFICE O/P EST LOW 20 MIN: CPT | Performed by: OBSTETRICS & GYNECOLOGY

## 2023-04-26 ASSESSMENT — FIBROSIS 4 INDEX: FIB4 SCORE: 0.37

## 2023-04-26 NOTE — PROGRESS NOTES
Pt here to discuss US results  Pt states she is no longer taking OCPs. She was experiencing cramping and irregular bleeding.   Good # [phone] (home)    Pharmacy verified.

## 2023-04-26 NOTE — PROGRESS NOTES
GYN visit    CC/reason for visit: FU ultrasound    HPI: Poly Soto is a 38 y.o.  with pelvic pain who presents today to follow-up ultrasound.  Ultrasound did not demonstrate any acute abnormality but did demonstrate a slight interval increase in the uterine fibroids.  Since I last saw the patient, she stopped taking her birth control and states that her pain has actually improved.  She is not having any issues with abnormal uterine bleeding.    ROS:  Reviewed and negative x 10 with pertinent positives listed in HPI above        GYN History:  No h/o abnormal pap, nor history of cone biopsy, LEEP or any other cervical, uterine or gynecologic surgery. States she had a pap with [facility] in .  No history of sexually transmitted diseases.       OB history:    OB History    Para Term  AB Living   1 1 0 1   1   SAB IAB Ectopic Molar Multiple Live Births             1      # Outcome Date GA Lbr Octavio/2nd Weight Sex Delivery Anes PTL Lv   1  06/05/10 35w0d  4 lb 15 oz F Vag-Spont None Y ATILIO      Birth Comments: BABY STILL IN NICU.       History reviewed. No pertinent past medical history.    History reviewed. No pertinent surgical history.    Medications:   Current Outpatient Medications Ordered in Epic   Medication Sig Dispense Refill    loperamide (IMODIUM) 2 MG Cap Take 1 Capsule by mouth 4 times a day as needed for Diarrhea. (Patient not taking: Reported on 2023) 12 Capsule 0    ondansetron (ZOFRAN ODT) 4 MG TABLET DISPERSIBLE Take 1 Tablet by mouth every 6 hours as needed for Nausea/Vomiting. (Patient not taking: Reported on 2023) 10 Tablet 0    polyethylene glycol 3350 (MIRALAX) Powder Take 17 g by mouth every day. (Patient not taking: Reported on 2023) 1 Bottle 0    norethindrone (MICRONOR) 0.35 MG tablet Take 1 Tab by mouth every day. (Patient not taking: Reported on 2023) 1 Each 11    docusate sodium (COLACE) 100 MG Cap Take 100 mg by mouth 2 times a day. For  constiparion  (Patient not taking: Reported on 2023)      ibuprofen (MOTRIN) 800 MG TABS Take 800 mg by mouth every 8 hours as needed for Mild Pain. For discomfort or cramping  (Patient not taking: Reported on 2023)      PRENATAL VITAMINS PO Take  by mouth. (Patient not taking: Reported on 2023)       No current Epic-ordered facility-administered medications on file.       Allergies: Patient has no known allergies.    Social History     Socioeconomic History    Marital status:    Tobacco Use    Smoking status: Never    Smokeless tobacco: Never   Vaping Use    Vaping Use: Never used   Substance and Sexual Activity    Alcohol use: No    Drug use: No    Sexual activity: Yes     Partners: Male     Birth control/protection: Pill     Comment: .       Family History   Problem Relation Age of Onset    No Known Problems Mother     No Known Problems Father          Physical Exam:  /62 (BP Location: Left arm, Patient Position: Sitting)   Wt 148 lb   LMP 2023   BMI 26.22 kg/m²   gen: AAO, NAD, affect appropriate  CV: No edema, cyanosis, or clubbing  Resp: Symmetric non labored breathing  Abd: soft, NT, ND, no masses, no organomegaly, no hernias  Skin: warm/dry, no lesions    A/P: 38 y.o.  with   1. Uterine leiomyoma, unspecified location  US-PELVIC COMPLETE (TRANSABDOMINAL/TRANSVAGINAL) (COMBO)        Discussed that if her pain has improved since coming off of her birth control pill, we can continue to monitor her symptoms at this time.  Due to slight interval increase in fibroids over the last 2 years, we will repeat an ultrasound in 6 months to check the growth of the fibroids.  If they are stable, no further work-up indicated.  However if growth is noticed, endometrial biopsy should be performed.    15 minutes were spent with patient more than 50% of that time was spent face-to-face counseling, educating, and coordinating care.   was used for the entirety  of this visit.   Is This A New Presentation, Or A Follow-Up?: Skin Lesion How Severe Is Your Skin Lesion?: mild Has Your Skin Lesion Been Treated?: not been treated

## 2023-10-10 ENCOUNTER — HOSPITAL ENCOUNTER (OUTPATIENT)
Dept: RADIOLOGY | Facility: MEDICAL CENTER | Age: 38
End: 2023-10-10
Attending: OBSTETRICS & GYNECOLOGY
Payer: COMMERCIAL

## 2023-10-10 ENCOUNTER — TELEPHONE (OUTPATIENT)
Dept: OBGYN | Facility: CLINIC | Age: 38
End: 2023-10-10
Payer: COMMERCIAL

## 2023-10-10 DIAGNOSIS — D25.9 UTERINE LEIOMYOMA, UNSPECIFIED LOCATION: ICD-10-CM

## 2023-10-10 PROCEDURE — 76830 TRANSVAGINAL US NON-OB: CPT

## 2023-10-10 NOTE — TELEPHONE ENCOUNTER
----- Message from Cherie Victoria D.O. sent at 10/10/2023 12:44 PM PDT -----  Please let marimar know that the fibroid is slightly larger and I would recommend and endometrial biopsy. Please schedule her for an endometrial biopsy. This needs to be done within the month.       10/10/23  1525 pt called returned Imani's RN call. Pt informed as above. Pt scheduled for EMB on 11/7/2023. Pt states she has [facility] and she needs a referral to be send to them. Explained to pt we will send a message to Dr. Victoria to place referral and pt advised to call us back if she does not hear from [facility] in a week. Pt agreed and verbalized understanding.    Imani FRANK aware to send message to Dr. Victoria.

## 2023-10-18 ENCOUNTER — TELEPHONE (OUTPATIENT)
Dept: OBGYN | Facility: CLINIC | Age: 38
End: 2023-10-18
Payer: COMMERCIAL

## 2023-10-18 NOTE — TELEPHONE ENCOUNTER
10/18/2023 1000   ID: [id number]  Pt called to inquire about [facility] referral for EMB. Told pt that I had sent a message to Dr. Victoria to place the referral, but had not heard back from her and would follow up on that today. Informed pt that I would call her back when the referral had been placed. Pt agreed and verbalized understanding.    10/23/2023 1455   from [employer] used.  Called pt and informed her that a referral had been sent today for her EMB. She should hear from them within a week.  If she doesn't hear from them, please call us back at [phone] and we will follow up. Pt agreed and verbalized understanding.

## 2023-10-23 DIAGNOSIS — D25.9 UTERINE LEIOMYOMA, UNSPECIFIED LOCATION: ICD-10-CM

## 2023-11-07 ENCOUNTER — HOSPITAL ENCOUNTER (OUTPATIENT)
Facility: MEDICAL CENTER | Age: 38
End: 2023-11-07
Attending: OBSTETRICS & GYNECOLOGY
Payer: COMMERCIAL

## 2023-11-07 ENCOUNTER — GYNECOLOGY VISIT (OUTPATIENT)
Dept: OBGYN | Facility: CLINIC | Age: 38
End: 2023-11-07
Payer: COMMERCIAL

## 2023-11-07 VITALS — BODY MASS INDEX: 25.69 KG/M2 | DIASTOLIC BLOOD PRESSURE: 72 MMHG | SYSTOLIC BLOOD PRESSURE: 126 MMHG | WEIGHT: 145 LBS

## 2023-11-07 DIAGNOSIS — R10.2 PELVIC PAIN: ICD-10-CM

## 2023-11-07 DIAGNOSIS — N93.9 ABNORMAL UTERINE BLEEDING (AUB): Primary | ICD-10-CM

## 2023-11-07 DIAGNOSIS — N93.9 ABNORMAL UTERINE BLEEDING (AUB): ICD-10-CM

## 2023-11-07 LAB
PATHOLOGY CONSULT NOTE: NORMAL
POCT INT CON NEG: NEGATIVE
POCT INT CON POS: POSITIVE
POCT URINE PREGNANCY TEST: NEGATIVE

## 2023-11-07 PROCEDURE — 3074F SYST BP LT 130 MM HG: CPT | Performed by: OBSTETRICS & GYNECOLOGY

## 2023-11-07 PROCEDURE — 88175 CYTOPATH C/V AUTO FLUID REDO: CPT

## 2023-11-07 PROCEDURE — 58100 BIOPSY OF UTERUS LINING: CPT | Performed by: OBSTETRICS & GYNECOLOGY

## 2023-11-07 PROCEDURE — 81025 URINE PREGNANCY TEST: CPT | Performed by: OBSTETRICS & GYNECOLOGY

## 2023-11-07 PROCEDURE — 3078F DIAST BP <80 MM HG: CPT | Performed by: OBSTETRICS & GYNECOLOGY

## 2023-11-07 PROCEDURE — 88305 TISSUE EXAM BY PATHOLOGIST: CPT

## 2023-11-07 RX ORDER — TENAPANOR HYDROCHLORIDE 53.2 MG/1
50 TABLET ORAL 2 TIMES DAILY PRN
COMMUNITY
Start: 2023-10-30

## 2023-11-07 RX ORDER — CYCLOBENZAPRINE HCL 5 MG
5-10 TABLET ORAL 3 TIMES DAILY PRN
Qty: 30 TABLET | Refills: 0 | Status: SHIPPED
Start: 2023-11-07 | End: 2023-11-25

## 2023-11-07 ASSESSMENT — FIBROSIS 4 INDEX: FIB4 SCORE: 0.37

## 2023-11-07 NOTE — PROGRESS NOTES
Here for Embx for growing fibroids.   Patient does not want hysterectomy. If embx results are normal, open to discussing medical management. Needs pap. Wants meds for pain when she has her period. Discussed she cannot drive or work while taking this medication.   Dr. WAGONER

## 2023-11-07 NOTE — PROCEDURES
EMB Procedure note     Indications for biopsy: growing uterine fibroids       Patient was counselled regarding the indications for an endometrial biopsy, the small but potential risks of perforation, infection, or inadequate sampling.  All of the patient’s questions were answered and she consented for an endometrial biopsy. Consent was signed.      Pregnancy test negative      Time out was done to confirm patient, procedure and appropriate equipment was present.       Patient is placed in dorsal lithotomy position and a speculum was placed into the vagina. The cervix was prepped with betadine x3. A tenaculum was placed on the anterior lip of the cervix. A cervical dilator was used to dilate the cervical canal.  The uterus sounded to 8 cm.     An endometrial pipelle was passed through the cervical os into the endometrial cavity. Two passes were made. Moderate tissue was obtained and sent to pathology.      Hemostasis was noted. All instruments were removed.      Patient tolerated the procedure well and there were no complications.  She was instructed that she may have bleeding and cramping but it should be minimal. She is to contact our office with signs of infection, severe pain or fever greater than 101. Follow up for results were arranged.

## 2023-11-09 ENCOUNTER — TELEPHONE (OUTPATIENT)
Dept: OBGYN | Facility: CLINIC | Age: 38
End: 2023-11-09
Payer: COMMERCIAL

## 2023-11-09 LAB
COMMENT NL11729A: NORMAL
CYTOLOGIST CVX/VAG CYTO: NORMAL
CYTOLOGY CVX/VAG DOC CYTO: NORMAL
CYTOLOGY CVX/VAG DOC THIN PREP: NORMAL
NOTE NL11727A: NORMAL
OTHER STN SPEC: NORMAL
STAT OF ADQ CVX/VAG CYTO-IMP: NORMAL

## 2023-11-09 NOTE — TELEPHONE ENCOUNTER
----- Message from Cherie Victoria D.O. sent at 11/8/2023  4:38 PM PST -----  Please let patient know her endometrial biopsy is normal.   Dr. Victoria       11/09/23  1012 Left message for pt to call back regarding biopsy results.   1016 pt called back and notified as above. Pt requested a f/u appt to discuss possible medical Tx as indicated on Dr. Victoria's note. Pt scheduled for 12/20/2023 at 0800. Pt requesting a different pain meds because Flexeril does not work for her. Pt state she has used it before and doesn't work. Explained to pt, this RN will send message to Dr. Victoria and will get back to her. Pt agreed and verbalize understanding.   11/10/23  1100 Consulted with Dr. Victoria and advised for pt to take Tylenol for her pain and also advised for pt to take 400mg of Magnesium Glycinate and 1200mg of Calcium daily. Pt agreed and verbalized understanding.

## 2023-11-24 ENCOUNTER — OFFICE VISIT (OUTPATIENT)
Dept: URGENT CARE | Facility: CLINIC | Age: 38
End: 2023-11-24
Payer: COMMERCIAL

## 2023-11-24 VITALS
SYSTOLIC BLOOD PRESSURE: 104 MMHG | RESPIRATION RATE: 16 BRPM | BODY MASS INDEX: 25.83 KG/M2 | TEMPERATURE: 97.9 F | OXYGEN SATURATION: 99 % | HEIGHT: 63 IN | DIASTOLIC BLOOD PRESSURE: 70 MMHG | WEIGHT: 145.8 LBS | HEART RATE: 80 BPM

## 2023-11-24 DIAGNOSIS — R11.0 NAUSEA: ICD-10-CM

## 2023-11-24 DIAGNOSIS — R10.13 EPIGASTRIC PAIN: ICD-10-CM

## 2023-11-24 PROCEDURE — 3078F DIAST BP <80 MM HG: CPT

## 2023-11-24 PROCEDURE — 99213 OFFICE O/P EST LOW 20 MIN: CPT

## 2023-11-24 PROCEDURE — 3074F SYST BP LT 130 MM HG: CPT

## 2023-11-24 RX ORDER — OMEPRAZOLE 20 MG/1
20 CAPSULE, DELAYED RELEASE ORAL DAILY
COMMUNITY
End: 2024-03-14

## 2023-11-24 RX ORDER — ONDANSETRON 4 MG/1
4 TABLET, ORALLY DISINTEGRATING ORAL ONCE
Status: COMPLETED | OUTPATIENT
Start: 2023-11-24 | End: 2023-11-24

## 2023-11-24 RX ADMIN — ONDANSETRON 4 MG: 4 TABLET, ORALLY DISINTEGRATING ORAL at 20:03

## 2023-11-24 ASSESSMENT — FIBROSIS 4 INDEX: FIB4 SCORE: 0.37

## 2023-11-25 ENCOUNTER — APPOINTMENT (OUTPATIENT)
Dept: RADIOLOGY | Facility: MEDICAL CENTER | Age: 38
End: 2023-11-25
Attending: STUDENT IN AN ORGANIZED HEALTH CARE EDUCATION/TRAINING PROGRAM
Payer: COMMERCIAL

## 2023-11-25 ENCOUNTER — ANESTHESIA EVENT (OUTPATIENT)
Dept: SURGERY | Facility: MEDICAL CENTER | Age: 38
End: 2023-11-25
Payer: COMMERCIAL

## 2023-11-25 ENCOUNTER — ANESTHESIA (OUTPATIENT)
Dept: SURGERY | Facility: MEDICAL CENTER | Age: 38
End: 2023-11-25
Payer: COMMERCIAL

## 2023-11-25 ENCOUNTER — HOSPITAL ENCOUNTER (OUTPATIENT)
Facility: MEDICAL CENTER | Age: 38
End: 2023-11-26
Attending: STUDENT IN AN ORGANIZED HEALTH CARE EDUCATION/TRAINING PROGRAM | Admitting: SURGERY
Payer: COMMERCIAL

## 2023-11-25 DIAGNOSIS — K80.00 CALCULUS OF GALLBLADDER WITH ACUTE CHOLECYSTITIS WITHOUT OBSTRUCTION: ICD-10-CM

## 2023-11-25 PROBLEM — K81.0 ACUTE CHOLECYSTITIS: Status: ACTIVE | Noted: 2023-11-25

## 2023-11-25 LAB
ALBUMIN SERPL BCP-MCNC: 4.1 G/DL (ref 3.2–4.9)
ALBUMIN/GLOB SERPL: 1.5 G/DL
ALP SERPL-CCNC: 106 U/L (ref 30–99)
ALT SERPL-CCNC: 470 U/L (ref 2–50)
ANION GAP SERPL CALC-SCNC: 12 MMOL/L (ref 7–16)
APPEARANCE UR: CLEAR
AST SERPL-CCNC: 603 U/L (ref 12–45)
BACTERIA #/AREA URNS HPF: ABNORMAL /HPF
BASOPHILS # BLD AUTO: 0.3 % (ref 0–1.8)
BASOPHILS # BLD: 0.03 K/UL (ref 0–0.12)
BILIRUB SERPL-MCNC: 1 MG/DL (ref 0.1–1.5)
BILIRUB UR QL STRIP.AUTO: ABNORMAL
BUN SERPL-MCNC: 11 MG/DL (ref 8–22)
CALCIUM ALBUM COR SERPL-MCNC: 8.9 MG/DL (ref 8.5–10.5)
CALCIUM SERPL-MCNC: 9 MG/DL (ref 8.5–10.5)
CHLORIDE SERPL-SCNC: 106 MMOL/L (ref 96–112)
CO2 SERPL-SCNC: 20 MMOL/L (ref 20–33)
COLOR UR: ABNORMAL
CREAT SERPL-MCNC: 0.69 MG/DL (ref 0.5–1.4)
EKG IMPRESSION: NORMAL
EOSINOPHIL # BLD AUTO: 0.06 K/UL (ref 0–0.51)
EOSINOPHIL NFR BLD: 0.7 % (ref 0–6.9)
EPI CELLS #/AREA URNS HPF: ABNORMAL /HPF
ERYTHROCYTE [DISTWIDTH] IN BLOOD BY AUTOMATED COUNT: 39.2 FL (ref 35.9–50)
GFR SERPLBLD CREATININE-BSD FMLA CKD-EPI: 114 ML/MIN/1.73 M 2
GLOBULIN SER CALC-MCNC: 2.8 G/DL (ref 1.9–3.5)
GLUCOSE SERPL-MCNC: 108 MG/DL (ref 65–99)
GLUCOSE UR STRIP.AUTO-MCNC: NEGATIVE MG/DL
HCG SERPL QL: NEGATIVE
HCT VFR BLD AUTO: 37.2 % (ref 37–47)
HGB BLD-MCNC: 13.3 G/DL (ref 12–16)
HYALINE CASTS #/AREA URNS LPF: ABNORMAL /LPF
IMM GRANULOCYTES # BLD AUTO: 0.03 K/UL (ref 0–0.11)
IMM GRANULOCYTES NFR BLD AUTO: 0.3 % (ref 0–0.9)
KETONES UR STRIP.AUTO-MCNC: NEGATIVE MG/DL
LEUKOCYTE ESTERASE UR QL STRIP.AUTO: ABNORMAL
LIPASE SERPL-CCNC: 39 U/L (ref 11–82)
LYMPHOCYTES # BLD AUTO: 2.16 K/UL (ref 1–4.8)
LYMPHOCYTES NFR BLD: 24.9 % (ref 22–41)
MCH RBC QN AUTO: 32.7 PG (ref 27–33)
MCHC RBC AUTO-ENTMCNC: 35.8 G/DL (ref 32.2–35.5)
MCV RBC AUTO: 91.4 FL (ref 81.4–97.8)
MICRO URNS: ABNORMAL
MONOCYTES # BLD AUTO: 0.98 K/UL (ref 0–0.85)
MONOCYTES NFR BLD AUTO: 11.3 % (ref 0–13.4)
MUCOUS THREADS #/AREA URNS HPF: ABNORMAL /HPF
NEUTROPHILS # BLD AUTO: 5.43 K/UL (ref 1.82–7.42)
NEUTROPHILS NFR BLD: 62.5 % (ref 44–72)
NITRITE UR QL STRIP.AUTO: NEGATIVE
NRBC # BLD AUTO: 0 K/UL
NRBC BLD-RTO: 0 /100 WBC (ref 0–0.2)
PH UR STRIP.AUTO: 5.5 [PH] (ref 5–8)
PLATELET # BLD AUTO: 236 K/UL (ref 164–446)
PMV BLD AUTO: 9.6 FL (ref 9–12.9)
POTASSIUM SERPL-SCNC: 4.2 MMOL/L (ref 3.6–5.5)
PROT SERPL-MCNC: 6.9 G/DL (ref 6–8.2)
PROT UR QL STRIP: NEGATIVE MG/DL
RBC # BLD AUTO: 4.07 M/UL (ref 4.2–5.4)
RBC # URNS HPF: ABNORMAL /HPF
RBC UR QL AUTO: NEGATIVE
SODIUM SERPL-SCNC: 138 MMOL/L (ref 135–145)
SP GR UR STRIP.AUTO: 1.02
UROBILINOGEN UR STRIP.AUTO-MCNC: 2 MG/DL
WBC # BLD AUTO: 8.7 K/UL (ref 4.8–10.8)
WBC #/AREA URNS HPF: ABNORMAL /HPF

## 2023-11-25 PROCEDURE — 160041 HCHG SURGERY MINUTES - EA ADDL 1 MIN LEVEL 4: Performed by: SURGERY

## 2023-11-25 PROCEDURE — 160009 HCHG ANES TIME/MIN: Performed by: SURGERY

## 2023-11-25 PROCEDURE — A9270 NON-COVERED ITEM OR SERVICE: HCPCS | Performed by: ANESTHESIOLOGY

## 2023-11-25 PROCEDURE — 99291 CRITICAL CARE FIRST HOUR: CPT

## 2023-11-25 PROCEDURE — 700111 HCHG RX REV CODE 636 W/ 250 OVERRIDE (IP): Mod: JZ | Performed by: ANESTHESIOLOGY

## 2023-11-25 PROCEDURE — RXMED WILLOW AMBULATORY MEDICATION CHARGE: Performed by: NURSE PRACTITIONER

## 2023-11-25 PROCEDURE — 93005 ELECTROCARDIOGRAM TRACING: CPT | Performed by: STUDENT IN AN ORGANIZED HEALTH CARE EDUCATION/TRAINING PROGRAM

## 2023-11-25 PROCEDURE — 47562 LAPAROSCOPIC CHOLECYSTECTOMY: CPT | Mod: AS | Performed by: NURSE PRACTITIONER

## 2023-11-25 PROCEDURE — 700111 HCHG RX REV CODE 636 W/ 250 OVERRIDE (IP): Mod: JZ | Performed by: STUDENT IN AN ORGANIZED HEALTH CARE EDUCATION/TRAINING PROGRAM

## 2023-11-25 PROCEDURE — 160035 HCHG PACU - 1ST 60 MINS PHASE I: Performed by: SURGERY

## 2023-11-25 PROCEDURE — 84703 CHORIONIC GONADOTROPIN ASSAY: CPT

## 2023-11-25 PROCEDURE — 700102 HCHG RX REV CODE 250 W/ 637 OVERRIDE(OP): Performed by: ANESTHESIOLOGY

## 2023-11-25 PROCEDURE — 96365 THER/PROPH/DIAG IV INF INIT: CPT

## 2023-11-25 PROCEDURE — 47562 LAPAROSCOPIC CHOLECYSTECTOMY: CPT | Performed by: SURGERY

## 2023-11-25 PROCEDURE — 160036 HCHG PACU - EA ADDL 30 MINS PHASE I: Performed by: SURGERY

## 2023-11-25 PROCEDURE — 96375 TX/PRO/DX INJ NEW DRUG ADDON: CPT

## 2023-11-25 PROCEDURE — 93005 ELECTROCARDIOGRAM TRACING: CPT

## 2023-11-25 PROCEDURE — G0378 HOSPITAL OBSERVATION PER HR: HCPCS

## 2023-11-25 PROCEDURE — A9270 NON-COVERED ITEM OR SERVICE: HCPCS | Performed by: NURSE PRACTITIONER

## 2023-11-25 PROCEDURE — 160002 HCHG RECOVERY MINUTES (STAT): Performed by: SURGERY

## 2023-11-25 PROCEDURE — 700102 HCHG RX REV CODE 250 W/ 637 OVERRIDE(OP): Performed by: NURSE PRACTITIONER

## 2023-11-25 PROCEDURE — 83690 ASSAY OF LIPASE: CPT

## 2023-11-25 PROCEDURE — 160029 HCHG SURGERY MINUTES - 1ST 30 MINS LEVEL 4: Performed by: SURGERY

## 2023-11-25 PROCEDURE — 700105 HCHG RX REV CODE 258: Performed by: STUDENT IN AN ORGANIZED HEALTH CARE EDUCATION/TRAINING PROGRAM

## 2023-11-25 PROCEDURE — 160048 HCHG OR STATISTICAL LEVEL 1-5: Performed by: SURGERY

## 2023-11-25 PROCEDURE — 700101 HCHG RX REV CODE 250: Performed by: SURGERY

## 2023-11-25 PROCEDURE — 81001 URINALYSIS AUTO W/SCOPE: CPT

## 2023-11-25 PROCEDURE — 85025 COMPLETE CBC W/AUTO DIFF WBC: CPT

## 2023-11-25 PROCEDURE — 80053 COMPREHEN METABOLIC PANEL: CPT

## 2023-11-25 PROCEDURE — 700101 HCHG RX REV CODE 250: Performed by: ANESTHESIOLOGY

## 2023-11-25 PROCEDURE — 99222 1ST HOSP IP/OBS MODERATE 55: CPT | Mod: 57 | Performed by: SURGERY

## 2023-11-25 PROCEDURE — 700111 HCHG RX REV CODE 636 W/ 250 OVERRIDE (IP): Performed by: ANESTHESIOLOGY

## 2023-11-25 PROCEDURE — 88304 TISSUE EXAM BY PATHOLOGIST: CPT

## 2023-11-25 PROCEDURE — 700105 HCHG RX REV CODE 258: Performed by: ANESTHESIOLOGY

## 2023-11-25 PROCEDURE — 36415 COLL VENOUS BLD VENIPUNCTURE: CPT

## 2023-11-25 PROCEDURE — 76705 ECHO EXAM OF ABDOMEN: CPT

## 2023-11-25 RX ORDER — LIDOCAINE HYDROCHLORIDE 20 MG/ML
INJECTION, SOLUTION EPIDURAL; INFILTRATION; INTRACAUDAL; PERINEURAL PRN
Status: DISCONTINUED | OUTPATIENT
Start: 2023-11-25 | End: 2023-11-25 | Stop reason: SURG

## 2023-11-25 RX ORDER — SODIUM CHLORIDE, SODIUM LACTATE, POTASSIUM CHLORIDE, CALCIUM CHLORIDE 600; 310; 30; 20 MG/100ML; MG/100ML; MG/100ML; MG/100ML
INJECTION, SOLUTION INTRAVENOUS CONTINUOUS
Status: DISCONTINUED | OUTPATIENT
Start: 2023-11-25 | End: 2023-11-25 | Stop reason: HOSPADM

## 2023-11-25 RX ORDER — CELECOXIB 200 MG/1
200 CAPSULE ORAL 2 TIMES DAILY
Status: DISCONTINUED | OUTPATIENT
Start: 2023-11-25 | End: 2023-11-25

## 2023-11-25 RX ORDER — ENOXAPARIN SODIUM 100 MG/ML
40 INJECTION SUBCUTANEOUS DAILY
Status: DISCONTINUED | OUTPATIENT
Start: 2023-11-25 | End: 2023-11-25

## 2023-11-25 RX ORDER — CIMETIDINE 300 MG/1
300 TABLET, FILM COATED ORAL NIGHTLY
Status: SHIPPED | COMMUNITY
End: 2024-03-14

## 2023-11-25 RX ORDER — BISACODYL 10 MG
10 SUPPOSITORY, RECTAL RECTAL
Status: DISCONTINUED | OUTPATIENT
Start: 2023-11-25 | End: 2023-11-25

## 2023-11-25 RX ORDER — CELECOXIB 200 MG/1
200 CAPSULE ORAL 2 TIMES DAILY PRN
Status: DISCONTINUED | OUTPATIENT
Start: 2023-11-30 | End: 2023-11-26 | Stop reason: HOSPADM

## 2023-11-25 RX ORDER — EPHEDRINE SULFATE 50 MG/ML
5 INJECTION, SOLUTION INTRAVENOUS
Status: DISCONTINUED | OUTPATIENT
Start: 2023-11-25 | End: 2023-11-25 | Stop reason: HOSPADM

## 2023-11-25 RX ORDER — HYDRALAZINE HYDROCHLORIDE 20 MG/ML
5 INJECTION INTRAMUSCULAR; INTRAVENOUS
Status: DISCONTINUED | OUTPATIENT
Start: 2023-11-25 | End: 2023-11-25 | Stop reason: HOSPADM

## 2023-11-25 RX ORDER — ONDANSETRON 2 MG/ML
4 INJECTION INTRAMUSCULAR; INTRAVENOUS EVERY 4 HOURS PRN
Status: DISCONTINUED | OUTPATIENT
Start: 2023-11-25 | End: 2023-11-25

## 2023-11-25 RX ORDER — HALOPERIDOL 5 MG/ML
1 INJECTION INTRAMUSCULAR EVERY 6 HOURS PRN
Status: DISCONTINUED | OUTPATIENT
Start: 2023-11-25 | End: 2023-11-26 | Stop reason: HOSPADM

## 2023-11-25 RX ORDER — PROMETHAZINE HYDROCHLORIDE 25 MG/1
12.5-25 SUPPOSITORY RECTAL EVERY 4 HOURS PRN
Status: DISCONTINUED | OUTPATIENT
Start: 2023-11-25 | End: 2023-11-25

## 2023-11-25 RX ORDER — SODIUM CHLORIDE, SODIUM LACTATE, POTASSIUM CHLORIDE, AND CALCIUM CHLORIDE .6; .31; .03; .02 G/100ML; G/100ML; G/100ML; G/100ML
500 INJECTION, SOLUTION INTRAVENOUS
Status: DISCONTINUED | OUTPATIENT
Start: 2023-11-25 | End: 2023-11-25

## 2023-11-25 RX ORDER — OXYCODONE HCL 5 MG/5 ML
5 SOLUTION, ORAL ORAL
Status: COMPLETED | OUTPATIENT
Start: 2023-11-25 | End: 2023-11-25

## 2023-11-25 RX ORDER — HALOPERIDOL 5 MG/ML
1 INJECTION INTRAMUSCULAR
Status: DISCONTINUED | OUTPATIENT
Start: 2023-11-25 | End: 2023-11-25 | Stop reason: HOSPADM

## 2023-11-25 RX ORDER — MIDAZOLAM HYDROCHLORIDE 1 MG/ML
1 INJECTION INTRAMUSCULAR; INTRAVENOUS
Status: DISCONTINUED | OUTPATIENT
Start: 2023-11-25 | End: 2023-11-25 | Stop reason: HOSPADM

## 2023-11-25 RX ORDER — OXYCODONE HYDROCHLORIDE 10 MG/1
10 TABLET ORAL
Status: DISCONTINUED | OUTPATIENT
Start: 2023-11-25 | End: 2023-11-26 | Stop reason: HOSPADM

## 2023-11-25 RX ORDER — ROCURONIUM BROMIDE 10 MG/ML
INJECTION, SOLUTION INTRAVENOUS PRN
Status: DISCONTINUED | OUTPATIENT
Start: 2023-11-25 | End: 2023-11-25 | Stop reason: SURG

## 2023-11-25 RX ORDER — IBUPROFEN 800 MG/1
800 TABLET ORAL EVERY 8 HOURS PRN
Qty: 30 TABLET | Status: SHIPPED | COMMUNITY
Start: 2023-11-25 | End: 2024-03-14

## 2023-11-25 RX ORDER — CELECOXIB 200 MG/1
200 CAPSULE ORAL 2 TIMES DAILY
Status: DISCONTINUED | OUTPATIENT
Start: 2023-11-25 | End: 2023-11-26 | Stop reason: HOSPADM

## 2023-11-25 RX ORDER — HYDROMORPHONE HYDROCHLORIDE 1 MG/ML
0.2 INJECTION, SOLUTION INTRAMUSCULAR; INTRAVENOUS; SUBCUTANEOUS
Status: DISCONTINUED | OUTPATIENT
Start: 2023-11-25 | End: 2023-11-25 | Stop reason: HOSPADM

## 2023-11-25 RX ORDER — MIDAZOLAM HYDROCHLORIDE 1 MG/ML
INJECTION INTRAMUSCULAR; INTRAVENOUS PRN
Status: DISCONTINUED | OUTPATIENT
Start: 2023-11-25 | End: 2023-11-25 | Stop reason: SURG

## 2023-11-25 RX ORDER — ONDANSETRON 2 MG/ML
4 INJECTION INTRAMUSCULAR; INTRAVENOUS ONCE
Status: COMPLETED | OUTPATIENT
Start: 2023-11-25 | End: 2023-11-25

## 2023-11-25 RX ORDER — LABETALOL HYDROCHLORIDE 5 MG/ML
10 INJECTION, SOLUTION INTRAVENOUS EVERY 4 HOURS PRN
Status: DISCONTINUED | OUTPATIENT
Start: 2023-11-25 | End: 2023-11-25

## 2023-11-25 RX ORDER — METRONIDAZOLE 500 MG/100ML
500 INJECTION, SOLUTION INTRAVENOUS ONCE
Status: COMPLETED | OUTPATIENT
Start: 2023-11-25 | End: 2023-11-25

## 2023-11-25 RX ORDER — SUCCINYLCHOLINE CHLORIDE 20 MG/ML
INJECTION INTRAMUSCULAR; INTRAVENOUS PRN
Status: DISCONTINUED | OUTPATIENT
Start: 2023-11-25 | End: 2023-11-25 | Stop reason: SURG

## 2023-11-25 RX ORDER — DIPHENHYDRAMINE HYDROCHLORIDE 50 MG/ML
25 INJECTION INTRAMUSCULAR; INTRAVENOUS EVERY 6 HOURS PRN
Status: DISCONTINUED | OUTPATIENT
Start: 2023-11-25 | End: 2023-11-26 | Stop reason: HOSPADM

## 2023-11-25 RX ORDER — DEXAMETHASONE SODIUM PHOSPHATE 4 MG/ML
INJECTION, SOLUTION INTRA-ARTICULAR; INTRALESIONAL; INTRAMUSCULAR; INTRAVENOUS; SOFT TISSUE PRN
Status: DISCONTINUED | OUTPATIENT
Start: 2023-11-25 | End: 2023-11-25 | Stop reason: SURG

## 2023-11-25 RX ORDER — ACETAMINOPHEN 325 MG/1
650 TABLET ORAL EVERY 6 HOURS PRN
Status: DISCONTINUED | OUTPATIENT
Start: 2023-11-25 | End: 2023-11-25

## 2023-11-25 RX ORDER — DEXAMETHASONE SODIUM PHOSPHATE 4 MG/ML
4 INJECTION, SOLUTION INTRA-ARTICULAR; INTRALESIONAL; INTRAMUSCULAR; INTRAVENOUS; SOFT TISSUE
Status: DISCONTINUED | OUTPATIENT
Start: 2023-11-25 | End: 2023-11-26 | Stop reason: HOSPADM

## 2023-11-25 RX ORDER — POLYETHYLENE GLYCOL 3350 17 G/17G
1 POWDER, FOR SOLUTION ORAL
Status: DISCONTINUED | OUTPATIENT
Start: 2023-11-25 | End: 2023-11-25

## 2023-11-25 RX ORDER — OMEPRAZOLE 20 MG/1
20 CAPSULE, DELAYED RELEASE ORAL DAILY
Status: DISCONTINUED | OUTPATIENT
Start: 2023-11-25 | End: 2023-11-26 | Stop reason: HOSPADM

## 2023-11-25 RX ORDER — AMOXICILLIN 250 MG
2 CAPSULE ORAL 2 TIMES DAILY
Status: DISCONTINUED | OUTPATIENT
Start: 2023-11-25 | End: 2023-11-25

## 2023-11-25 RX ORDER — DIPHENHYDRAMINE HYDROCHLORIDE 50 MG/ML
12.5 INJECTION INTRAMUSCULAR; INTRAVENOUS
Status: DISCONTINUED | OUTPATIENT
Start: 2023-11-25 | End: 2023-11-25 | Stop reason: HOSPADM

## 2023-11-25 RX ORDER — ACETAMINOPHEN 500 MG
1000 TABLET ORAL EVERY 6 HOURS PRN
Status: DISCONTINUED | OUTPATIENT
Start: 2023-11-30 | End: 2023-11-26 | Stop reason: HOSPADM

## 2023-11-25 RX ORDER — CEFOTETAN DISODIUM 2 G/20ML
INJECTION, POWDER, FOR SOLUTION INTRAMUSCULAR; INTRAVENOUS PRN
Status: DISCONTINUED | OUTPATIENT
Start: 2023-11-25 | End: 2023-11-25 | Stop reason: SURG

## 2023-11-25 RX ORDER — PROMETHAZINE HYDROCHLORIDE 25 MG/1
12.5-25 TABLET ORAL EVERY 4 HOURS PRN
Status: DISCONTINUED | OUTPATIENT
Start: 2023-11-25 | End: 2023-11-25

## 2023-11-25 RX ORDER — MORPHINE SULFATE 4 MG/ML
4 INJECTION INTRAVENOUS ONCE
Status: COMPLETED | OUTPATIENT
Start: 2023-11-25 | End: 2023-11-25

## 2023-11-25 RX ORDER — OXYCODONE HYDROCHLORIDE 5 MG/1
5 TABLET ORAL
Status: DISCONTINUED | OUTPATIENT
Start: 2023-11-25 | End: 2023-11-26 | Stop reason: HOSPADM

## 2023-11-25 RX ORDER — HYDROMORPHONE HYDROCHLORIDE 1 MG/ML
0.4 INJECTION, SOLUTION INTRAMUSCULAR; INTRAVENOUS; SUBCUTANEOUS
Status: DISCONTINUED | OUTPATIENT
Start: 2023-11-25 | End: 2023-11-25 | Stop reason: HOSPADM

## 2023-11-25 RX ORDER — PROCHLORPERAZINE EDISYLATE 5 MG/ML
5-10 INJECTION INTRAMUSCULAR; INTRAVENOUS EVERY 4 HOURS PRN
Status: DISCONTINUED | OUTPATIENT
Start: 2023-11-25 | End: 2023-11-25

## 2023-11-25 RX ORDER — OXYCODONE HYDROCHLORIDE AND ACETAMINOPHEN 5; 325 MG/1; MG/1
1-2 TABLET ORAL EVERY 4 HOURS PRN
Qty: 15 TABLET | Refills: 0 | Status: ON HOLD | OUTPATIENT
Start: 2023-11-25 | End: 2023-11-30

## 2023-11-25 RX ORDER — ACETAMINOPHEN 500 MG
1000 TABLET ORAL EVERY 6 HOURS
Status: DISCONTINUED | OUTPATIENT
Start: 2023-11-25 | End: 2023-11-26 | Stop reason: HOSPADM

## 2023-11-25 RX ORDER — M-VIT,TX,IRON,MINS/CALC/FOLIC 27MG-0.4MG
1 TABLET ORAL DAILY
COMMUNITY

## 2023-11-25 RX ORDER — ONDANSETRON 2 MG/ML
4 INJECTION INTRAMUSCULAR; INTRAVENOUS EVERY 4 HOURS PRN
Status: DISCONTINUED | OUTPATIENT
Start: 2023-11-25 | End: 2023-11-26 | Stop reason: HOSPADM

## 2023-11-25 RX ORDER — CIMETIDINE 300 MG/1
300 TABLET, FILM COATED ORAL NIGHTLY
Status: DISCONTINUED | OUTPATIENT
Start: 2023-11-25 | End: 2023-11-25

## 2023-11-25 RX ORDER — LIDOCAINE HYDROCHLORIDE 40 MG/ML
SOLUTION TOPICAL PRN
Status: DISCONTINUED | OUTPATIENT
Start: 2023-11-25 | End: 2023-11-25 | Stop reason: SURG

## 2023-11-25 RX ORDER — HYDROMORPHONE HYDROCHLORIDE 1 MG/ML
0.5 INJECTION, SOLUTION INTRAMUSCULAR; INTRAVENOUS; SUBCUTANEOUS
Status: DISCONTINUED | OUTPATIENT
Start: 2023-11-25 | End: 2023-11-26 | Stop reason: HOSPADM

## 2023-11-25 RX ORDER — BUPIVACAINE HYDROCHLORIDE AND EPINEPHRINE 5; 5 MG/ML; UG/ML
INJECTION, SOLUTION EPIDURAL; INTRACAUDAL; PERINEURAL
Status: DISCONTINUED | OUTPATIENT
Start: 2023-11-25 | End: 2023-11-25 | Stop reason: HOSPADM

## 2023-11-25 RX ORDER — SCOLOPAMINE TRANSDERMAL SYSTEM 1 MG/1
1 PATCH, EXTENDED RELEASE TRANSDERMAL
Status: DISCONTINUED | OUTPATIENT
Start: 2023-11-25 | End: 2023-11-26 | Stop reason: HOSPADM

## 2023-11-25 RX ORDER — FAMOTIDINE 20 MG/1
20 TABLET, FILM COATED ORAL 2 TIMES DAILY
Status: DISCONTINUED | OUTPATIENT
Start: 2023-11-25 | End: 2023-11-26 | Stop reason: HOSPADM

## 2023-11-25 RX ORDER — SODIUM CHLORIDE, SODIUM LACTATE, POTASSIUM CHLORIDE, CALCIUM CHLORIDE 600; 310; 30; 20 MG/100ML; MG/100ML; MG/100ML; MG/100ML
1000 INJECTION, SOLUTION INTRAVENOUS ONCE
Status: COMPLETED | OUTPATIENT
Start: 2023-11-25 | End: 2023-11-25

## 2023-11-25 RX ORDER — OXYCODONE HCL 5 MG/5 ML
10 SOLUTION, ORAL ORAL
Status: COMPLETED | OUTPATIENT
Start: 2023-11-25 | End: 2023-11-25

## 2023-11-25 RX ORDER — SODIUM CHLORIDE, SODIUM LACTATE, POTASSIUM CHLORIDE, CALCIUM CHLORIDE 600; 310; 30; 20 MG/100ML; MG/100ML; MG/100ML; MG/100ML
INJECTION, SOLUTION INTRAVENOUS
Status: DISCONTINUED | OUTPATIENT
Start: 2023-11-25 | End: 2023-11-25 | Stop reason: HOSPADM

## 2023-11-25 RX ORDER — ONDANSETRON 4 MG/1
4 TABLET, ORALLY DISINTEGRATING ORAL EVERY 4 HOURS PRN
Status: DISCONTINUED | OUTPATIENT
Start: 2023-11-25 | End: 2023-11-25

## 2023-11-25 RX ORDER — CEFTRIAXONE 1 G/1
1000 INJECTION, POWDER, FOR SOLUTION INTRAMUSCULAR; INTRAVENOUS ONCE
Status: COMPLETED | OUTPATIENT
Start: 2023-11-25 | End: 2023-11-25

## 2023-11-25 RX ORDER — HYDROMORPHONE HYDROCHLORIDE 1 MG/ML
0.1 INJECTION, SOLUTION INTRAMUSCULAR; INTRAVENOUS; SUBCUTANEOUS
Status: DISCONTINUED | OUTPATIENT
Start: 2023-11-25 | End: 2023-11-25 | Stop reason: HOSPADM

## 2023-11-25 RX ADMIN — METRONIDAZOLE 500 MG: 500 INJECTION, SOLUTION INTRAVENOUS at 04:15

## 2023-11-25 RX ADMIN — CELECOXIB 200 MG: 200 CAPSULE ORAL at 17:41

## 2023-11-25 RX ADMIN — ONDANSETRON 4 MG: 2 INJECTION INTRAMUSCULAR; INTRAVENOUS at 01:58

## 2023-11-25 RX ADMIN — MIDAZOLAM 2 MG: 1 INJECTION, SOLUTION INTRAMUSCULAR; INTRAVENOUS at 07:55

## 2023-11-25 RX ADMIN — MORPHINE SULFATE 4 MG: 4 INJECTION, SOLUTION INTRAMUSCULAR; INTRAVENOUS at 01:58

## 2023-11-25 RX ADMIN — FAMOTIDINE 20 MG: 20 TABLET ORAL at 17:41

## 2023-11-25 RX ADMIN — SUCCINYLCHOLINE CHLORIDE 65.8 MG: 20 INJECTION, SOLUTION INTRAMUSCULAR; INTRAVENOUS at 08:01

## 2023-11-25 RX ADMIN — PROPOFOL 120 MG: 10 INJECTION, EMULSION INTRAVENOUS at 08:01

## 2023-11-25 RX ADMIN — LIDOCAINE HYDROCHLORIDE 100 MG: 20 INJECTION, SOLUTION EPIDURAL; INFILTRATION; INTRACAUDAL at 07:56

## 2023-11-25 RX ADMIN — FENTANYL CITRATE 50 MCG: 50 INJECTION, SOLUTION INTRAMUSCULAR; INTRAVENOUS at 09:25

## 2023-11-25 RX ADMIN — HYDROMORPHONE HYDROCHLORIDE 0.2 MG: 1 INJECTION, SOLUTION INTRAMUSCULAR; INTRAVENOUS; SUBCUTANEOUS at 10:05

## 2023-11-25 RX ADMIN — OMEPRAZOLE 20 MG: 20 CAPSULE, DELAYED RELEASE ORAL at 12:18

## 2023-11-25 RX ADMIN — OXYCODONE 5 MG: 5 TABLET ORAL at 15:54

## 2023-11-25 RX ADMIN — DEXAMETHASONE SODIUM PHOSPHATE 10 MG: 4 INJECTION INTRA-ARTICULAR; INTRALESIONAL; INTRAMUSCULAR; INTRAVENOUS; SOFT TISSUE at 08:03

## 2023-11-25 RX ADMIN — FENTANYL CITRATE 50 MCG: 50 INJECTION, SOLUTION INTRAMUSCULAR; INTRAVENOUS at 09:16

## 2023-11-25 RX ADMIN — LIDOCAINE HYDROCHLORIDE 4 ML: 40 SOLUTION TOPICAL at 08:01

## 2023-11-25 RX ADMIN — SODIUM CHLORIDE, POTASSIUM CHLORIDE, SODIUM LACTATE AND CALCIUM CHLORIDE 1000 ML: 600; 310; 30; 20 INJECTION, SOLUTION INTRAVENOUS at 01:58

## 2023-11-25 RX ADMIN — CEFOTETAN DISODIUM 2 G: 2 INJECTION, POWDER, FOR SOLUTION INTRAMUSCULAR; INTRAVENOUS at 08:03

## 2023-11-25 RX ADMIN — ROCURONIUM BROMIDE 20 MG: 50 INJECTION, SOLUTION INTRAVENOUS at 08:10

## 2023-11-25 RX ADMIN — CELECOXIB 200 MG: 200 CAPSULE ORAL at 12:18

## 2023-11-25 RX ADMIN — SODIUM CHLORIDE, POTASSIUM CHLORIDE, SODIUM LACTATE AND CALCIUM CHLORIDE: 600; 310; 30; 20 INJECTION, SOLUTION INTRAVENOUS at 07:55

## 2023-11-25 RX ADMIN — CEFTRIAXONE SODIUM 1000 MG: 1 INJECTION, POWDER, FOR SOLUTION INTRAMUSCULAR; INTRAVENOUS at 04:13

## 2023-11-25 RX ADMIN — OXYCODONE 5 MG: 5 TABLET ORAL at 19:52

## 2023-11-25 RX ADMIN — HALOPERIDOL LACTATE 1 MG: 5 INJECTION, SOLUTION INTRAMUSCULAR at 09:17

## 2023-11-25 RX ADMIN — OXYCODONE HYDROCHLORIDE 10 MG: 5 SOLUTION ORAL at 09:32

## 2023-11-25 RX ADMIN — HYDROMORPHONE HYDROCHLORIDE 0.4 MG: 1 INJECTION, SOLUTION INTRAMUSCULAR; INTRAVENOUS; SUBCUTANEOUS at 09:49

## 2023-11-25 RX ADMIN — HYDROMORPHONE HYDROCHLORIDE 0.4 MG: 1 INJECTION, SOLUTION INTRAMUSCULAR; INTRAVENOUS; SUBCUTANEOUS at 09:43

## 2023-11-25 RX ADMIN — ACETAMINOPHEN 1000 MG: 500 TABLET, FILM COATED ORAL at 17:41

## 2023-11-25 ASSESSMENT — PAIN DESCRIPTION - PAIN TYPE
TYPE: SURGICAL PAIN
TYPE: ACUTE PAIN;SURGICAL PAIN
TYPE: SURGICAL PAIN
TYPE: ACUTE PAIN;SURGICAL PAIN
TYPE: SURGICAL PAIN
TYPE: SURGICAL PAIN
TYPE: ACUTE PAIN
TYPE: SURGICAL PAIN
TYPE: ACUTE PAIN;SURGICAL PAIN
TYPE: SURGICAL PAIN

## 2023-11-25 ASSESSMENT — FIBROSIS 4 INDEX
FIB4 SCORE: 0.37
FIB4 SCORE: 4.36

## 2023-11-25 NOTE — PROGRESS NOTES
Assumed patient care at 1125 via transport. Patient is Kinyarwanda speaking,  in use. Patient alert and oriented. Patient ambulating SBA. frame alarm is on. Plan of care discussed, education provided on all administered medications, and hourly rounding in place.

## 2023-11-25 NOTE — OR NURSING
0901  patient arrived from OR via gurney siderails up, brake locked. Maintaining own airway, responding to voice. Received report from Dr. Pacheco and Socorro SWANN RN, assumed care. VSS. Four abdominal trocar sites with Dermabond, CDI. Ice pack applied to surgical site.  Patient appears comfortable, no s/s of pain or nausea noted. Orders reviewed and implemented.   0905  Patient awake,  service, Swapna [id number], used for assessment and communication. Patient reports pain and nausea. Fentanyl and Haldol administered.   0922  updated.  0925  and daughter bedside. Patient reports nausea improved.  0932 Oxycodone administered for continued report of pain.  0943  Dilaudid administered for pain.  1020 Patient resting comfortably, reports pain improved and tolerable. VSS. Surgical site remains CDI. 1025 Recovery complete.   1048 Report to Ed SUTTON RN.   1110 Discharged to room with oxygen and belongings.

## 2023-11-25 NOTE — ANESTHESIA PROCEDURE NOTES
Airway    Date/Time: 11/25/2023 8:01 AM    Performed by: May Pacheco M.D.  Authorized by: May Pacheco M.D.    Location:  OR  Urgency:  Elective  Indications for Airway Management:  Anesthesia      Spontaneous Ventilation: absent    Sedation Level:  Deep  Preoxygenated: Yes    Patient Position:  Sniffing  Mask Difficulty Assessment:  0 - not attempted  Final Airway Type:  Endotracheal airway  Final Endotracheal Airway:  ETT  Cuffed: Yes    Technique Used for Successful ETT Placement:  Direct laryngoscopy    Insertion Site:  Oral  Blade Type:  Giovanna  Laryngoscope Blade/Videolaryngoscope Blade Size:  3  ETT Size (mm):  7.0  Measured from:  Teeth  ETT to Teeth (cm):  23  Placement Verified by: auscultation and capnometry    Cormack-Lehane Classification:  Grade I - full view of glottis  Number of Attempts at Approach:  1

## 2023-11-25 NOTE — ED TRIAGE NOTES
"Chief Complaint   Patient presents with    Abdominal Pain     Pt presents with mid to TUNG Abdominal pain that started around 1700 on 11/24. Pt denies chest pain but endorses SOB.        Pt wheeled to triage. Pt A&Ox4, for above complaint.     Pt to lobby . Pt educated on alerting staff in changes to condition. Pt verbalized understanding. Protocol abdominal pain/ ECG.     Ht 1.6 m (5' 3\")   Wt 65.8 kg (145 lb)   LMP 10/13/2023 (Approximate)   BMI 25.69 kg/m²   "

## 2023-11-25 NOTE — ED NOTES
Med rec complete per patient  Allergies reviewed.   Patient denies any outpatient antibiotics in the last 30 days.   Anticoagulants taken in the last 14 days? No     [facility]

## 2023-11-25 NOTE — PROGRESS NOTES
"Chief Complaint   Patient presents with    Abdominal Pain     Abdominal pain right underneath the sternum that just started tonight at 5:00 pm., she stated \"it feels really suffocating when trying to breath\".       HISTORY OF PRESENT ILLNESS: Patient is a pleasant 38 y.o. female who presents to urgent care today ongoing epigastric pain and nausea for the last several hours.  Denies any blood in her stool, no vomiting, no noted fevers.  She has not taken anything for this.  Patient states it is worse when she tries to breathe.  Denies any history related otherwise.    Patient Active Problem List    Diagnosis Date Noted    Postpartum care and examination 2010    Threatened  labor 2010    Supervision of normal first pregnancy 2010    Not immune to rubella 2010       Allergies:Patient has no known allergies.    Current Outpatient Medications Ordered in Epic   Medication Sig Dispense Refill    omeprazole (PRILOSEC) 20 MG delayed-release capsule Take 20 mg by mouth every day.      IBSRELA 50 MG Tab Take 1 Tablet by mouth 2 times a day.      cyclobenzaprine (FLEXERIL) 5 mg tablet Take 1-2 Tablets by mouth 3 times a day as needed for Mild Pain or Moderate Pain. 30 Tablet 0    loperamide (IMODIUM) 2 MG Cap Take 1 Capsule by mouth 4 times a day as needed for Diarrhea. (Patient not taking: Reported on 2023) 12 Capsule 0    ondansetron (ZOFRAN ODT) 4 MG TABLET DISPERSIBLE Take 1 Tablet by mouth every 6 hours as needed for Nausea/Vomiting. (Patient not taking: Reported on 2023) 10 Tablet 0    polyethylene glycol 3350 (MIRALAX) Powder Take 17 g by mouth every day. (Patient not taking: Reported on 2023) 1 Bottle 0    norethindrone (MICRONOR) 0.35 MG tablet Take 1 Tab by mouth every day. (Patient not taking: Reported on 2023) 1 Each 11    docusate sodium (COLACE) 100 MG Cap Take 100 mg by mouth 2 times a day. For constiparion  (Patient not taking: Reported on 2023)      " "ibuprofen (MOTRIN) 800 MG TABS Take 800 mg by mouth every 8 hours as needed for Mild Pain. For discomfort or cramping  (Patient not taking: Reported on 4/26/2023)      PRENATAL VITAMINS PO Take  by mouth. (Patient not taking: Reported on 4/26/2023)       Current Facility-Administered Medications Ordered in Epic   Medication Dose Route Frequency Provider Last Rate Last Admin    ondansetron (Zofran ODT) dispertab 4 mg  4 mg Oral Once JANINE Sanchez           No past medical history on file.    Social History     Tobacco Use    Smoking status: Never    Smokeless tobacco: Never   Vaping Use    Vaping Use: Never used   Substance Use Topics    Alcohol use: No    Drug use: No       Family Status   Relation Name Status    Mo  Alive    Fa  Alive     Family History   Problem Relation Age of Onset    No Known Problems Mother     No Known Problems Father        ROS:  Review of Systems   Constitutional: Negative for fever, chills, weight loss, malaise, and fatigue.   Neuro: Negative for headache, sensory changes, weakness, seizure, LOC.   Cardiovascular: Negative for chest pain, palpitations, orthopnea and leg swelling.   Respiratory: Negative for cough, sputum production, shortness of breath and wheezing.   Gastrointestinal: Negative for abdominal pain, positive nausea, negative vomiting or diarrhea.  Positive epigastric pain  Genitourinary: Negative for dysuria, urgency and frequency.  Musculoskeletal: Negative for falls, neck pain, back pain, joint pain, myalgias.   Skin: Negative for rash, diaphoresis.     Exam:  /70 (BP Location: Left arm, Patient Position: Sitting, BP Cuff Size: Adult)   Pulse 80   Temp 36.6 °C (97.9 °F) (Temporal)   Resp 16   Ht 1.6 m (5' 3\")   Wt 66.1 kg (145 lb 12.8 oz)   SpO2 99%   General: well-nourished, well-developed female in NAD  Head: normocephalic, atraumatic  Eyes: PERRLA, no conjunctival injection, acuity grossly intact, lids normal.  Ears: normal shape and symmetry, no " tenderness, no discharge. External canals are without any significant edema or erythema. Tympanic membranes are without any inflammation, no effusion. Gross auditory acuity is intact.  Nose: symmetrical without tenderness, no discharge.  Mouth/Throat: reasonable hygiene, no erythema, exudates or tonsillar enlargement.  Neck: no masses, range of motion within normal limits, no tracheal deviation. No obvious thyroid enlargement.   Lymph: no cervical adenopathy. No supraclavicular adenopathy.   Neuro: alert and oriented. No sensory deficit.   Cardiovascular: regular rate and rhythm. No edema.  Pulmonary: no distress. Chest is symmetrical with respiration, no wheezes, crackles, or rhonchi.   Abdomen: soft, non-tender, no guarding, no hepatosplenomegaly.  Negative CVA tenderness  Musculoskeletal: no clubbing, appropriate muscle tone, gait is stable.  Skin: warm, dry, intact, no clubbing, no cyanosis, no rashes.   Psych: appropriate mood, affect, judgement.         Assessment/Plan:  1. Epigastric pain        2. Nausea  ondansetron (Zofran ODT) dispertab 4 mg      Patient is a pleasant 38 y.o. female who presents to urgent care today ongoing epigastric pain and nausea for the last several hours.  Denies any blood in her stool, no vomiting, no noted fevers.  She has not taken anything for this.  Patient states it is worse when she tries to breathe.  Denies any history related otherwise.  Patient stomach is soft and nontender, negative CVA tenderness.  Normal active bowel sounds.  I did go ahead and give her a GI cocktail here in the office today, she states her pain is slightly improved however her nausea remains.  Patient given ODT Zofran.  Patient was monitored here in the office to ensure proper treatment.  Strict ER precautions were enforced with the patient, if her symptoms continue, if she develops any chest pain, shortness of breath, increasing abdominal pain, nausea or vomiting she is to seek further evaluation in  the emergency room.  Patient is aware of the plan of care and agreeable at this time.      Supportive care, differential diagnoses, and indications for immediate follow-up discussed with patient.   Pathogenesis of diagnosis discussed including typical length and natural progression.   Instructed to return to clinic or nearest emergency department for any change in condition, further concerns, or worsening of symptoms.  Patient states understanding of the plan of care and discharge instructions.  Instructed to make an appointment, for follow up, with primary care provider.        Please note that this dictation was created using voice recognition software. I have made every reasonable attempt to correct obvious errors, but I expect that there are errors of grammar and possibly content that I did not discover before finalizing the note.      Aliyah JACOBO

## 2023-11-25 NOTE — ED NOTES
Patient transferred to OR accompanied by transport staff, patient AAO X 4, respirations even and unlabored on room air. Patient belongings handed over to family, patient in hospital gown, contact glasses with patient.

## 2023-11-25 NOTE — OP REPORT
Surgeon: Chinedu Campos MD   Assistant: WILLIS Leung  Preoperative diagnosis: Acute and chronic cholecystitis   Postop diagnosis: Acute and chronic cholecystitis   Procedure: Laparoscopic cholecystectomy   Anesthesia: General endotracheal anesthesia   Anesthesiologist: May Pacheco MD  Indications: 38-year-old woman with exacerbation of her chronic cholecystitis.  A cholecystectomy is indicated.  Narrative: The procedure was discussed in detail with the patient including the laparoscopic approach, the potential for converting to an open procedure, and the associated risk of bleeding, infection, abscess, and hematoma.  I also discussed the risk of postoperative bile leak, common bile duct stricture, common bile duct transection, and the significance of these complications. The patient understood all of the above and wished to proceed. The patient was placed under anesthesia by Dr. Pacheco. Patient's abdomen was prepped with chlorhexidine prep and sterile drapes. After a time out an infraumbilical incision was made. Through this incision the peritoneal cavity was entered using the open Hason entry technique. pneumoperitoneum was achieved with co2 insufflation to a pressure of 15 mmhg mercury. under direct visualization a 12 millimeters subxiphoid and two 5mm subcostal ports were placed. the gallbladder was identified and retracted superiorly and laterally. multiple adhesions were noted and these were carefully taken down. the base of the gallbladder was carefully dissected. the cystic duct was identified and could be seen to clearly exit the gallbladder and retract laterally along the gallbladder. In  a similar fashion the cystic artery was identified and dissected away from surrounding connective tissue. a critical view was obtained. subsequent to this 3 clips were placed proximally on the duct and 1 distally and it was divided with scissors. Similarly, the cystic artery was clipped twice proximally and  one distally and divided sharply with scissors. The gallbladder was then dissected off the liver bed and placed in a bag retrieval device. Hemostasis was assured with cautery. There was no evidence of bleeding or bile leak. Ports removed and the pneumoperitoneum was released. The infraumbilical fascia was approximated with an 0 Vicryl stitch. The subcutaneous tissue was irrigated and the skin on all incisions was approximatedwith 4.0 vicryl  stitches. Dermabond was placed. The patient tolerated procedure well without apparent complication. Final counts were reported as correct.    The first assist, WILLIS Leung, was necessary in this case due to the complexity of the operation.  Cherie assisted with port placement, holding of retractors and managing the laparoscope.  She also assisted with port removal and incision closure.

## 2023-11-25 NOTE — ED NOTES
Checked on bed, connected to monitor,  with unlabored respirations. Vital signs is stable. No current needs identified.  Gurney in low position, side rail up for pt safety. Call light within reach.

## 2023-11-25 NOTE — PROGRESS NOTES
4 Eyes Skin Assessment Completed by ZAC Ward and MARGA Michael.    Head WDL  Ears WDL  Nose WDL  Mouth WDL  Neck WDL  Breast/Chest WDL  Shoulder Blades WDL  Spine WDL  (R) Arm/Elbow/Hand WDL  (L) Arm/Elbow/Hand WDL  Abdomen 4 lap sites with dermabond.   Groin WDL  Scrotum/Coccyx/Buttocks Scar  (R) Leg WDL  (L) Leg WDL  (R) Heel/Foot/Toe WDL  (L) Heel/Foot/Toe WDL          Devices In Places Pulse Ox, SCD's, and Oxy Mask      Interventions In Place Gray Ear Foams, Pillows, and Pressure Redistribution Mattress    Possible Skin Injury No    Pictures Uploaded Into Epic N/A  Wound Consult Placed N/A  RN Wound Prevention Protocol Ordered No

## 2023-11-25 NOTE — ANESTHESIA POSTPROCEDURE EVALUATION
Patient: Poly Soto    Procedure Summary       Date: 11/25/23 Room / Location: [facility] 09 / SURGERY [facility]    Anesthesia Start: 0755 Anesthesia Stop: 0905    Procedure: CHOLECYSTECTOMY, LAPAROSCOPIC (Abdomen) Diagnosis: (chronic cholecystitis)    Surgeons: Chinedu Campos M.D. Responsible Provider: May Pacheco M.D.    Anesthesia Type: general ASA Status: 2 - Emergent            Final Anesthesia Type: general  Last vitals  BP   Blood Pressure: (!) 141/63 (Noted)    Temp   36.6 °C (97.8 °F)    Pulse   78   Resp   16    SpO2   98 %      Anesthesia Post Evaluation    Patient location during evaluation: PACU  Patient participation: complete - patient participated  Level of consciousness: awake and alert    Airway patency: patent  Anesthetic complications: no  Cardiovascular status: hemodynamically stable  Respiratory status: acceptable  Hydration status: euvolemic    PONV: none          No notable events documented.     Nurse Pain Score: 3 (NPRS)

## 2023-11-25 NOTE — ED NOTES
RELIEF RN    PT STATES ABD PAIN INTERMITTENT FOR 2 WEEKS. PT SATES PAIN HAS GOTTEN WORSE OVER THE LAST TWO DAYS. PT WITH NAUSEA AND DENIES ANY VOMITING OR DIARRHEA.     PT IS RESTING IN BED. BREATHING IS EVEN AND UNLABORED, SKIN IS WARM AND DRY, VSS, NAD, WILL CONTINUE TO MONITOR. PENDING MD EVALUATION.

## 2023-11-25 NOTE — CARE PLAN
The patient is Stable - Low risk of patient condition declining or worsening         Progress made toward(s) clinical / shift goals:  Educated patient on pain rating scales, frequent pain assessments in place, medicating per MAR, non pharmaceutical pain relief such as heat pads and positioning in use.        Patient is not progressing towards the following goals:

## 2023-11-25 NOTE — H&P
"    CHIEF COMPLAINT: Right upper quadrant pain     HISTORY OF PRESENT ILLNESS: The patient is a 38 year-old  woman who presents to the Emergency Department a 1-day history of moderate right upper quadrant  abdominal pain. The pain is associated with nausea. She reports intermittent precipitation and exacerbation of symptoms with ingestion of fatty, greasy, and spicy foods.  She denies a family history of gallstones. The patient denies any recent or intercurrent illness. The patient denies any history of previous abdominal surgery.    PAST MEDICAL HISTORY:  has no past medical history of Breast cancer (HCC).    PAST SURGICAL HISTORY:  has no past surgical history on file.    ALLERGIES: No Known Allergies    CURRENT MEDICATIONS:    Home Medications       Reviewed by Tawana Hernandez R.N. (Registered Nurse) on 11/25/23 at 0704  Med List Status: Partial     Medication Last Dose Status   cimetidine (TAGAMET) 300 MG Tab 11/24/2023 Active   IBSRELA 50 MG Tab 11/24/2023 Active   omeprazole (PRILOSEC) 20 MG delayed-release capsule 11/24/2023 Active   therapeutic multivitamin-minerals (THERAGRAN-M) Tab 11/24/2023 Active                    FAMILY HISTORY: family history includes No Known Problems in her father and mother.    SOCIAL HISTORY:  reports that she has never smoked. She has never used smokeless tobacco. She reports that she does not drink alcohol and does not use drugs.    REVIEW OF SYSTEMS: Comprehensive review of systems is negative with the exception of the aforementioned HPI, PMH, and PSH bullets in accordance with CMS guidelines.    PHYSICAL EXAMINATION:      Constitutional:     Vital Signs: /62   Pulse 78   Temp 36.3 °C (97.3 °F) (Temporal)   Resp 16   Ht 1.6 m (5' 3\")   Wt 65.8 kg (145 lb)   SpO2 98%    General Appearance: appears stated age.  HEENT: The pupils are equal, round, and reactive to light bilaterally. The extraocular muscles are intact bilaterally. The sclera are non  icteric. Nares " and oropharynx are clear.   Neck: Supple. No adenopathy.  Respiratory:   Inspection: Unlabored respirations, no intercostal retractions, paradoxical motion, or accessory muscle use.   Auscultation: normal.  Cardiovascular:   Inspection: The skin is warm.  Auscultation: Regular rate and rhythm.   Peripheral Pulses: Normal.   Abdomen:  Inspection: Abdominal inspection reveals  no incisions .   Palpation: Palpation is remarkable for no significant tenderness, guarding, or peritoneal findings. No abdominal wall hernias.  Extremities:   Examination of the upper and lower extremities demonstrates no cyanosis edema or clubbing.  Neurologic:   Alert & oriented to person, time and place. Normal motor function. Normal sensory function. No focal deficits noted.    LABORATORY VALUES:   Recent Labs     11/25/23  0115   WBC 8.7   RBC 4.07*   HEMOGLOBIN 13.3   HEMATOCRIT 37.2   MCV 91.4   MCH 32.7   MCHC 35.8*   RDW 39.2   PLATELETCT 236   MPV 9.6     Recent Labs     11/25/23  0115   SODIUM 138   POTASSIUM 4.2   CHLORIDE 106   CO2 20   GLUCOSE 108*   BUN 11   CREATININE 0.69   CALCIUM 9.0     Recent Labs     11/25/23  0115   ASTSGOT 603*   ALTSGPT 470*   TBILIRUBIN 1.0   ALKPHOSPHAT 106*   GLOBULIN 2.8            IMAGING:   US-RUQ   Final Result      1.  There is cholelithiasis with gallbladder sludge. Borderline gallbladder wall thickening with no biliary dilatation.          ASSESSMENT AND PLAN:   38-year-old woman in generally good health now with evidence by history, physical, laboratory data, and imaging of chronic and potentially acute cholecystitis.  A cholecystectomy is indicated.  I discussed this in detail with including the laparoscopic approach, potential converting to an open surgery, associated risk of bleeding, infection, abscess, and hematoma.  Also discussed the risk of postop bile leak, common bile duct stricture, common bile duct transection, and the significance of these complications.  understands all the  above and does wish to proceed.  We will make arrangements.          DISPOSITION: Admit [facility].     ____________________________________     Chinedu Campos M.D.    DD: 11/25/2023  7:21 AM

## 2023-11-25 NOTE — ED NOTES
Assumed patient care. Verified patient identification. Report from break RN  Checked on bed, connected to monitor,  with unlabored respirations. Discussed plan of care.   Vital signs is stable. Gurney in low position, side rail up for pt safety. Call light within reach.

## 2023-11-25 NOTE — ANESTHESIA PREPROCEDURE EVALUATION
Case: [id number] Date/Time: 11/25/23 0730    Procedure: CHOLECYSTECTOMY, LAPAROSCOPIC    Location: [facility] OR 09 / SURGERY [facility]    Surgeons: Chinedu Campos M.D.          39yo F with acute cholecystitis, here for lap darrion    Never had surgery; denies medical problems    Relevant Problems   No relevant active problems       Physical Exam    Airway   Mallampati: II  TM distance: >3 FB  Neck ROM: full       Cardiovascular - normal exam  Rhythm: regular  Rate: normal  (-) murmur     Dental - normal exam           Pulmonary - normal exam  Breath sounds clear to auscultation     Abdominal    Neurological - normal exam                   Anesthesia Plan    ASA 2- EMERGENT   ASA physical status emergent criteria: acutely contaminated wound or identified infection source    Plan - general       Airway plan will be ETT          Induction: intravenous and rapid sequence    Postoperative Plan: Postoperative administration of opioids is intended.    Pertinent diagnostic labs and testing reviewed    Informed Consent:    Anesthetic plan and risks discussed with patient.    Use of blood products discussed with: patient whom consented to blood products.

## 2023-11-25 NOTE — ED PROVIDER NOTES
ED Provider Note    CHIEF COMPLAINT  Chief Complaint   Patient presents with    Abdominal Pain     Pt presents with mid to TUNG Abdominal pain that started around 1700 on 11/24. Pt denies chest pain but endorses SOB.        EXTERNAL RECORDS REVIEWED  Outpatient Notes patient was seen at urgent care yesterday for evaluation of epigastric pain and was given a GI cocktail    HPI/ROS  LIMITATION TO HISTORY   Select: : None  OUTSIDE HISTORIAN(S):  None    Ploy Soto is a 38 y.o. female who presents for evaluation of epigastric abdominal pain that has been present for the past 2 days.  She last ate yesterday around noon.  She states that eating makes the pain worse.  She has associated nausea but no vomiting.  She states that the pain is 8 out of 10.  She states that she was given a GI cocktail at urgent care yesterday and that the cocktail made it worse.  She denies any chest pain, shortness of breath, diarrhea, dysuria.  She has never had any abdominal surgeries.  She also sometimes takes naproxen.    PAST MEDICAL HISTORY   She has no chronic medical problems    SURGICAL HISTORY  patient denies any surgical history    FAMILY HISTORY  Family History   Problem Relation Age of Onset    No Known Problems Mother     No Known Problems Father        SOCIAL HISTORY  Social History     Tobacco Use    Smoking status: Never    Smokeless tobacco: Never   Vaping Use    Vaping Use: Never used   Substance and Sexual Activity    Alcohol use: No    Drug use: No    Sexual activity: Yes     Partners: Male     Birth control/protection: None       CURRENT MEDICATIONS  Home Medications       Reviewed by Mina Wise R.N. (Registered Nurse) on 11/25/23 at 0044  Med List Status: Partial     Medication Last Dose Status   cyclobenzaprine (FLEXERIL) 5 mg tablet  Active   docusate sodium (COLACE) 100 MG Cap  Active   IBSRELA 50 MG Tab  Active   ibuprofen (MOTRIN) 800 MG TABS  Active   loperamide (IMODIUM) 2 MG Cap  Active  "  norethindrone (MICRONOR) 0.35 MG tablet  Active   omeprazole (PRILOSEC) 20 MG delayed-release capsule  Active   ondansetron (ZOFRAN ODT) 4 MG TABLET DISPERSIBLE  Active   polyethylene glycol 3350 (MIRALAX) Powder  Active   PRENATAL VITAMINS PO  Active                    ALLERGIES  No Known Allergies    PHYSICAL EXAM  VITAL SIGNS: /66   Pulse 92   Temp 36.6 °C (97.8 °F) (Temporal)   Resp 18   Ht 1.6 m (5' 3\")   Wt 65.8 kg (145 lb)   LMP 10/13/2023 (Approximate)   SpO2 100%   BMI 25.69 kg/m²      Constitutional: Well developed, Well nourished, No acute distress, Non-toxic appearance.   HEENT: Normocephalic, Atraumatic,  external ears normal, pharynx pink,  Mucous  Membranes moist, No rhinorrhea or mucosal edema  Eyes: PERRL, EOMI, Conjunctiva normal, No discharge.   Neck: Normal range of motion, No tenderness, Supple, No stridor.   Cardiovascular: Regular Rate and Rhythm, No murmurs,  rubs, or gallops.   Thorax & Lungs: Lungs clear to auscultation bilaterally, No respiratory distress, No wheezes, rhales or rhonchi, No chest wall tenderness.   Abdomen: Soft, tenderness along the upper abdomen, worse in the epigastrium, negative Grayson sign, no lower quadrant tenderness to palpation  Skin: Warm, Dry, No erythema, No rash, no jaundice  Back:  No CVA tenderness,  No spinal tenderness, bony crepitance step offs or instability.   Extremities: Equal, intact distal pulses, No cyanosis, clubbing or edema,  No tenderness.   Neurologic: Alert & oriented No focal deficits noted.  Psychiatric: Affect normal, Judgment normal, Mood normal.      DIAGNOSTIC STUDIES / PROCEDURES    LABS  Results for orders placed or performed during the hospital encounter of 11/25/23   CBC WITH DIFFERENTIAL   Result Value Ref Range    WBC 8.7 4.8 - 10.8 K/uL    RBC 4.07 (L) 4.20 - 5.40 M/uL    Hemoglobin 13.3 12.0 - 16.0 g/dL    Hematocrit 37.2 37.0 - 47.0 %    MCV 91.4 81.4 - 97.8 fL    MCH 32.7 27.0 - 33.0 pg    MCHC 35.8 (H) 32.2 - " 35.5 g/dL    RDW 39.2 35.9 - 50.0 fL    Platelet Count 236 164 - 446 K/uL    MPV 9.6 9.0 - 12.9 fL    Neutrophils-Polys 62.50 44.00 - 72.00 %    Lymphocytes 24.90 22.00 - 41.00 %    Monocytes 11.30 0.00 - 13.40 %    Eosinophils 0.70 0.00 - 6.90 %    Basophils 0.30 0.00 - 1.80 %    Immature Granulocytes 0.30 0.00 - 0.90 %    Nucleated RBC 0.00 0.00 - 0.20 /100 WBC    Neutrophils (Absolute) 5.43 1.82 - 7.42 K/uL    Lymphs (Absolute) 2.16 1.00 - 4.80 K/uL    Monos (Absolute) 0.98 (H) 0.00 - 0.85 K/uL    Eos (Absolute) 0.06 0.00 - 0.51 K/uL    Baso (Absolute) 0.03 0.00 - 0.12 K/uL    Immature Granulocytes (abs) 0.03 0.00 - 0.11 K/uL    NRBC (Absolute) 0.00 K/uL   COMP METABOLIC PANEL   Result Value Ref Range    Sodium 138 135 - 145 mmol/L    Potassium 4.2 3.6 - 5.5 mmol/L    Chloride 106 96 - 112 mmol/L    Co2 20 20 - 33 mmol/L    Anion Gap 12.0 7.0 - 16.0    Glucose 108 (H) 65 - 99 mg/dL    Bun 11 8 - 22 mg/dL    Creatinine 0.69 0.50 - 1.40 mg/dL    Calcium 9.0 8.5 - 10.5 mg/dL    Correct Calcium 8.9 8.5 - 10.5 mg/dL    AST(SGOT) 603 (H) 12 - 45 U/L    ALT(SGPT) 470 (H) 2 - 50 U/L    Alkaline Phosphatase 106 (H) 30 - 99 U/L    Total Bilirubin 1.0 0.1 - 1.5 mg/dL    Albumin 4.1 3.2 - 4.9 g/dL    Total Protein 6.9 6.0 - 8.2 g/dL    Globulin 2.8 1.9 - 3.5 g/dL    A-G Ratio 1.5 g/dL   LIPASE   Result Value Ref Range    Lipase 39 11 - 82 U/L   HCG QUAL SERUM   Result Value Ref Range    Beta-Hcg Qualitative Serum Negative Negative   URINALYSIS,CULTURE IF INDICATED    Specimen: Urine, Clean Catch   Result Value Ref Range    Color DK Yellow     Character Clear     Specific Gravity 1.022 <1.035    Ph 5.5 5.0 - 8.0    Glucose Negative Negative mg/dL    Ketones Negative Negative mg/dL    Protein Negative Negative mg/dL    Bilirubin Small (A) Negative    Urobilinogen, Urine 2.0 Negative    Nitrite Negative Negative    Leukocyte Esterase Trace (A) Negative    Occult Blood Negative Negative    Micro Urine Req Microscopic     ESTIMATED GFR   Result Value Ref Range    GFR (CKD-EPI) 114 >60 mL/min/1.73 m 2   URINE MICROSCOPIC (W/UA)   Result Value Ref Range    WBC 0-2 /hpf    RBC 0-2 /hpf    Bacteria Few (A) None /hpf    Epithelial Cells Few /hpf    Mucous Threads Moderate /hpf    Hyaline Cast 0-2 /lpf   EKG (NOW)   Result Value Ref Range    Report       [facility] Emergency Dept.    Test Date:  2023  Pt Name:    HUSEYIN COLBY          Department: ER  MRN:        [id number]                      Room:  Gender:     Female                       Technician: [id number]  :        1985                   Requested By:ER TRIAGE PROTOCOL  Order #:    [id number]                    Reading MD:    Measurements  Intervals                                Axis  Rate:       74                           P:          41  MI:         146                          QRS:        57  QRSD:       109                          T:          19  QT:         407  QTc:        452    Interpretive Statements  Sinus rhythm  Borderline T abnormalities, diffuse leads  No previous ECG available for comparison           RADIOLOGY  I have independently interpreted the diagnostic imaging associated with this visit and am waiting the final reading from the radiologist.   My preliminary interpretation is as follows: + mildly dilated gallbladder wall  Radiologist interpretation:   US-RUQ   Final Result      1.  There is cholelithiasis with gallbladder sludge. Borderline gallbladder wall thickening with no biliary dilatation.            COURSE & MEDICAL DECISION MAKING    ED Observation Status? Yes; I am placing the patient in to an observation status due to a diagnostic uncertainty as well as therapeutic intensity. Patient placed in observation status at 1:38 AM, 2023.     Observation plan is as follows: Labs, imaging, pain control, admission if ultrasound abnormal    3:32 AM ultrasound with cholelithiasis with gallbladder wall thickening, will  discuss with general surgery    3:35 AM discussed with acute care surgery, Dr. Campos, who states will take to the operating room for cholecystectomy    3:40 AM patient was reevaluated at bedside.  Discussed ultrasound findings and plan for OR with [facility].  Discussed with patient that gallbladder wall thickening may be early signs of infection and that her LFTs are abnormal therefore I recommend surgery at this point.  She states that her pain is well-controlled at this point.  She is agreeable to plan with no further questions.      Upon Reevaluation, the patient's condition has: not improved; and will be escalated to hospitalization.    Patient discharged from ED Observation status at 3:32 (Time) 11/25/2023 (Date).     INITIAL ASSESSMENT, COURSE AND PLAN  Care Narrative:   This is a 38-year-old female with history as noted above who is presenting for evaluation of upper abdominal pain has been ongoing for the past 2 days.  On arrival her vital signs are stable.  She is nontoxic in appearance.  She has upper abdominal tenderness palpation but no evidence of peritonitis.  There is no lower quadrant tenderness to palpation or fever to raise concern for appendicitis or ovarian pathology.    Considered pancreatitis however lipase is within normal limits.  Considered cholelithasis, cholecystitis, less likely acute cholangitis given her well appearance and no leukocytosis.  She does have elevation in her AST, ALT, alkaline phosphatase but her bilirubin is normal.  Right upper quadrant ultrasound with cholelithiasis with borderline gallbladder wall thickening.  I suspect that she has recently passed a gallstone which is causing her LFT derangement.  Given concern for early cholecystitis, she was given ceftriaxone and Flagyl.  I discussed the patient's case with acute care surgery, Dr. Campos, who recommends cholecystectomy.  Discussed with patient regarding results and plan of care.  She is agreeable to plan with no further  questions.  She has been kept n.p.o.  She will be taken to the operating room this morning for cholecystectomy.    HYDRATION: Based on the patient's presentation of Other keeping n.p.o. given cholecystitis the patient was given IV fluids. IV Hydration was used because oral hydration was not adequate alone. Upon recheck following hydration, the patient was improved with normal vital signs.      ADDITIONAL PROBLEM LIST  None  DISPOSITION AND DISCUSSIONS  I have discussed management of the patient with the following physicians and ROSSY's:    General surgery, Dr. Campos    Discussion of management with other [facility] or appropriate source(s): None     Escalation of care considered, and ultimately not performed:None    Barriers to care at this time, including but not limited to:  None .     Decision tools and prescription drugs considered including, but not limited to:  None .    Patient taken to the operating room for cholecystectomy by general surgeon, Dr. Campos, in guarded yet stable condition    FINAL DIAGNOSIS  Cholecystitis       Electronically signed by: Joyce Lyon M.D., 11/25/2023 1:37 AM

## 2023-11-25 NOTE — ANESTHESIA TIME REPORT
Anesthesia Start and Stop Event Times       Date Time Event    11/25/2023 0730 Ready for Procedure     0755 Anesthesia Start     0905 Anesthesia Stop          Responsible Staff  11/25/23      Name Role Begin End    May Pacheco M.D. Anesth 0755 0905          Overtime Reason:  no overtime (within assigned shift)    Comments:

## 2023-11-26 ENCOUNTER — PHARMACY VISIT (OUTPATIENT)
Dept: PHARMACY | Facility: MEDICAL CENTER | Age: 38
End: 2023-11-26
Payer: COMMERCIAL

## 2023-11-26 VITALS
HEIGHT: 62 IN | TEMPERATURE: 98.6 F | SYSTOLIC BLOOD PRESSURE: 88 MMHG | OXYGEN SATURATION: 95 % | RESPIRATION RATE: 16 BRPM | HEART RATE: 79 BPM | DIASTOLIC BLOOD PRESSURE: 50 MMHG | BODY MASS INDEX: 26.98 KG/M2 | WEIGHT: 146.61 LBS

## 2023-11-26 LAB
BASOPHILS # BLD AUTO: 0.2 % (ref 0–1.8)
BASOPHILS # BLD: 0.02 K/UL (ref 0–0.12)
EOSINOPHIL # BLD AUTO: 0.01 K/UL (ref 0–0.51)
EOSINOPHIL NFR BLD: 0.1 % (ref 0–6.9)
ERYTHROCYTE [DISTWIDTH] IN BLOOD BY AUTOMATED COUNT: 40.7 FL (ref 35.9–50)
HCT VFR BLD AUTO: 33.4 % (ref 37–47)
HGB BLD-MCNC: 11.7 G/DL (ref 12–16)
IMM GRANULOCYTES # BLD AUTO: 0.04 K/UL (ref 0–0.11)
IMM GRANULOCYTES NFR BLD AUTO: 0.4 % (ref 0–0.9)
LYMPHOCYTES # BLD AUTO: 1.72 K/UL (ref 1–4.8)
LYMPHOCYTES NFR BLD: 16 % (ref 22–41)
MCH RBC QN AUTO: 32.2 PG (ref 27–33)
MCHC RBC AUTO-ENTMCNC: 35 G/DL (ref 32.2–35.5)
MCV RBC AUTO: 92 FL (ref 81.4–97.8)
MONOCYTES # BLD AUTO: 1.39 K/UL (ref 0–0.85)
MONOCYTES NFR BLD AUTO: 12.9 % (ref 0–13.4)
NEUTROPHILS # BLD AUTO: 7.6 K/UL (ref 1.82–7.42)
NEUTROPHILS NFR BLD: 70.4 % (ref 44–72)
NRBC # BLD AUTO: 0 K/UL
NRBC BLD-RTO: 0 /100 WBC (ref 0–0.2)
PLATELET # BLD AUTO: 206 K/UL (ref 164–446)
PMV BLD AUTO: 10 FL (ref 9–12.9)
RBC # BLD AUTO: 3.63 M/UL (ref 4.2–5.4)
WBC # BLD AUTO: 10.8 K/UL (ref 4.8–10.8)

## 2023-11-26 PROCEDURE — 85025 COMPLETE CBC W/AUTO DIFF WBC: CPT

## 2023-11-26 PROCEDURE — G0378 HOSPITAL OBSERVATION PER HR: HCPCS

## 2023-11-26 PROCEDURE — 700102 HCHG RX REV CODE 250 W/ 637 OVERRIDE(OP): Performed by: NURSE PRACTITIONER

## 2023-11-26 PROCEDURE — A9270 NON-COVERED ITEM OR SERVICE: HCPCS | Performed by: NURSE PRACTITIONER

## 2023-11-26 PROCEDURE — 99238 HOSP IP/OBS DSCHRG MGMT 30/<: CPT | Performed by: NURSE PRACTITIONER

## 2023-11-26 RX ORDER — ACETAMINOPHEN 500 MG
1000 TABLET ORAL EVERY 6 HOURS
Qty: 30 TABLET | Refills: 0 | COMMUNITY
Start: 2023-11-26

## 2023-11-26 RX ADMIN — FAMOTIDINE 20 MG: 20 TABLET ORAL at 05:17

## 2023-11-26 RX ADMIN — ACETAMINOPHEN 1000 MG: 500 TABLET, FILM COATED ORAL at 05:16

## 2023-11-26 RX ADMIN — OXYCODONE 5 MG: 5 TABLET ORAL at 03:38

## 2023-11-26 RX ADMIN — OMEPRAZOLE 20 MG: 20 CAPSULE, DELAYED RELEASE ORAL at 05:16

## 2023-11-26 RX ADMIN — OXYCODONE HYDROCHLORIDE 10 MG: 10 TABLET ORAL at 09:37

## 2023-11-26 RX ADMIN — CELECOXIB 200 MG: 200 CAPSULE ORAL at 05:16

## 2023-11-26 RX ADMIN — ACETAMINOPHEN 1000 MG: 500 TABLET, FILM COATED ORAL at 00:08

## 2023-11-26 ASSESSMENT — PAIN DESCRIPTION - PAIN TYPE: TYPE: ACUTE PAIN;SURGICAL PAIN

## 2023-11-26 NOTE — DISCHARGE INSTRUCTIONS
Discharge Instructions    Discharged to home by car with relative. Discharged via wheelchair, hospital escort: Yes.  Special equipment needed: Not Applicable    Be sure to schedule a follow-up appointment with your primary care doctor or any specialists as instructed.     Discharge Plan:   Diet Plan: Discussed  Activity Level: Discussed  Confirmed Follow up Appointment: Patient to Call and Schedule Appointment  Confirmed Symptoms Management: Discussed  Medication Reconciliation Updated: Yes  Influenza Vaccine Indication: Patient Refuses    I understand that a diet low in cholesterol, fat, and sodium is recommended for good health. Unless I have been given specific instructions below for another diet, I accept this instruction as my diet prescription.   Other diet: Regular diet as tolerated    Special Instructions: None    -Is this patient being discharged with medication to prevent blood clots?  No    Is patient discharged on Warfarin / Coumadin?   No Colecistectomía mínimamente invasiva, cuidados posteriores  Minimally Invasive Cholecystectomy, Care After  ¿Qué puedo esperar después del procedimiento?  Después del procedimiento, es común tener los siguientes síntomas:  Sentir dolor en las zonas de la cirugía. Le darán medicamentos para el dolor.  Vomitar o tener náuseas.  Sentir el vientre lleno (meteorismo) o tener dolor en el hombro. [place] se debe al gas que se usó kumar la cirugía.  Siga estas instrucciones en pathak casa:  Medicamentos  Use los medicamentos de venta ruth y los recetados solamente terell se lo haya indicado el médico.  Si le recetaron un antibiótico, tómelo terell se lo haya indicado el médico. No deje de tomarlo aunque comience a sentirse mejor.  Si se lo indican, tome medidas a fin de prevenir problemas para ir de cuerpo (estreñimiento). Es posible que deba hacer lo siguiente:  Beber suficiente líquido para mantener el pis (la orina) de color amarillo pálido.  Dario medicamentos. Le dirán qué  medicamentos debe arcelia.  Valera alimentos ricos en fibra. Entre ellos, frijoles, cereales integrales y frutas y verduras frescas.  Limitar los alimentos con alto contenido de grasa y azúcar. Estos incluyen alimentos fritos o dulces.  Pregunte al médico si debe evitar conducir o utilizar máquinas mientras marcos los medicamentos.  Cuidado de la incisión    Siga las instrucciones del médico en lo que respecta al cuidado de los louis de la cirugía (incisiones). Asegúrese de hacer lo siguiente:  Lávese las liz con agua y jabón kumar al menos 20 segundos antes y después de cambiarse la venda (vendaje). Use un desinfectante para liz si no dispone de agua y jabón.  Cámbiese la venda.  Deje los puntos (suturas) o la goma para cerrar la piel en pathak lugar kumar al menos 2 semanas.  Deje colocadas las tiras de cinta adhesiva a menos que se le indique que se las quite. Puede recortar los bordes de las tiras de cinta si se enrollan.  No tome marli de inmersión, no practique natación ni utilice el jacuzzi. Pregunte a pathak médico si puede ducharse o darse marli de esponja.  Controle la hussain de la incisión todos los días para detectar signos de infección. Esté atento a los siguientes signos:  Aumento del enrojecimiento, la hinchazón o el dolor.  Líquido o sean.  Calor.  Pus o mal olor.  Actividad  Gabriela reposo terell se lo haya indicado el médico. No realice actividades que requieran mucho esfuerzo.  Levántese y camine un poco cada 1 a 2 horas. Pida ayuda si se siente débil o inestable.  No levante objetos que pesen más de 10 libras (4.5 kg) o que corey más pesados de lo que le indicaron.  No practique deportes de contacto hasta que el médico lo autorice.  No retome el trabajo ni el estudio hasta que el médico lo autorice.  Retome lisa actividades normales cuando el médico le diga que es seguro.  Instrucciones generales  Si le administraron un sedante kumar el procedimiento, no conduzca ni use máquinas hasta que el médico le  indique que es seguro hacerlo. Un sedante es un medicamento que ayuda a relajarse.  Concurra a todas las visitas de seguimiento.  Comuníquese con un médico si:  Aparece dustin erupción cutánea.  Aumentan el enrojecimiento, la hinchazón o el dolor alrededor de las incisiones.  Le sale líquido o sean de las incisiones.  Las incisiones están calientes al tacto.  Tiene pus o percibe que sale mal olor del lugar de las incisiones.  Tiene fiebre.  Dustin o más de las incisiones se abren.  Solicite ayuda de inmediato si:  Tiene dificultad para respirar.  Siente dolor en el pecho.  Siente dolor que empeora en la hussain de los hombros.  Se desmaya o se siente mareado al ponerse de pie.  Tiene dolor muy intenso en el vientre (abdomen).  Siente que va a vomitar o vomita y esto dura más de un día.  Siente dolor en la pierna.  Estos síntomas pueden indicar dustin emergencia. Solicite ayuda de inmediato. Llame al 911.  No espere a neto si los síntomas desaparecen.  No conduzca por lisa propios medios hasta el hospital.  Resumen  Después de la cirugía, es común sentir dolor en las zonas de la cirugía. También puede tener vómitos o sentir llenura en el vientre.  Siga las instrucciones del médico acerca de los medicamentos, la restricción de actividades y el cuidado de las zonas de la cirugía. No realice actividades que requieran mucho esfuerzo.  Comuníquese con el médico si tiene fiebre u otros signos de infección, terell más enrojecimiento, hinchazón o dolor alrededor de las incisiones.  Busque ayuda de inmediato si tiene dolor de pecho, dolor en los hombros que va en aumento o problemas para respirar.  Esta información no tiene terell fin reemplazar el consejo del médico. Asegúrese de hacerle al médico cualquier pregunta que tenga.  Document Revised: 07/06/2022 Document Reviewed: 07/06/2022  Elsevier Patient Education © 2023 Else[employer] Inc.

## 2023-11-26 NOTE — CARE PLAN
The patient is Stable - Low risk of patient condition declining or worsening    Shift Goals  Clinical Goals: Safety  Patient Goals: Rest    Progress made toward(s) clinical / shift goals:      Problem: Pain - Standard  Goal: Alleviation of pain or a reduction in pain to the patient’s comfort goal  11/25/2023 2056 by Cherie Piedra R.N.  Flowsheets (Taken 11/25/2023 1952)  Pain Rating Scale (NPRS): 6  Note: Medicated per MAR, provided extra pillows/ blankets for support/ repositioning. Will continue to assess/ treat accordingly.  11/25/2023 2056 by Cherie Piedra R.N.  Outcome: Progressing    Problem: Skin Integrity  Goal: Skin integrity is maintained or improved  Outcome: Progressing  Note: Appropriate interventions in place to maintain/ improve skin integrity.

## 2023-11-26 NOTE — PROGRESS NOTES
Patient discharged home with follow up instructions. Discharge information and education provided by this RN, all questions answered. PIV removed. All belongings returned, Meds to beds delivered. Patient escorted out via wheelchair.

## 2023-11-26 NOTE — DISCHARGE SUMMARY
DISCHARGE  SUMMARY    DATE OF ADMISSION: 11/25/2023    DATE OF DISCHARGE: 11/26/2023    DISCHARGE DIAGNOSIS:  Principal Problem:    Acute cholecystitis  Active Problems:    Cholecystitis, acute with cholelithiasis  Resolved Problems:    * No resolved hospital problems. *      CONSULTATIONS:  None    PROCEDURES:  1. Completed on 11/25/2023, Laparoscopic cholecystectomy.    BRIEF HPI and HOSPITAL COURSE:  This is a 38-year-old female presented emergency room with a 1 day history of right upper quadrant pain.  Work-up in the emergency room did show signs of cholecystitis.  Patient was then taken to the operative theater under the direction of Dr. Campos.  For specific details on the operative procedure please see the dictated summary.  Postoperatively patient was transferred to observation unit where she was given pain management and a diet.  She was discharged home on the morning of November 26.  She will follow-up in the [facility] clinic in 1 to 2 weeks.    DISPOSITION:   Discharged home on 11/26/2023. The patient and family were counseled and questions were answered. Specifically, signs and symptoms of infection, respiratory decompensation, follow up and persistent or worsening pain were discussed and the patient agrees to seek medical attention if any of these develop.    DISCHARGE MEDICATIONS:  The patients controlled substance history was reviewed and a controlled substance use informed consent (if applicable) was provided by [facility] and the patient has been prescribed.     Medication List        START taking these medications        Instructions   acetaminophen 500 MG Tabs  Commonly known as: Tylenol   Take 2 Tablets by mouth every 6 hours.  Dose: 1,000 mg     ibuprofen 800 MG Tabs  Commonly known as: Motrin   Take 1 Tablet by mouth every 8 hours as needed for Mild Pain, Moderate Pain or Inflammation.  Dose: 800 mg     oxyCODONE-acetaminophen 5-325 MG Tabs  Commonly known as: Percocet   Take  1-2 Tablets by mouth every four hours as needed for Severe Pain for up to 3 days.  Dose: 1-2 Tablet            CONTINUE taking these medications        Instructions   cimetidine 300 MG Tabs  Commonly known as: Tagamet   Take 300 mg by mouth every evening.  Dose: 300 mg     Ibsrela 50 MG Tabs  Generic drug: Tenapanor HCl   Take 50 mg by mouth 2 times a day.  Dose: 50 mg     omeprazole 20 MG delayed-release capsule  Commonly known as: PriLOSEC   Take 20 mg by mouth every day.  Dose: 20 mg     therapeutic multivitamin-minerals Tabs   Take 1 Tablet by mouth every day.  Dose: 1 Tablet            You will be given a prescription for pain medication at discharge. Please take these as directed. It is important to remember not to take medications on an empty stomach as this may cause nausea.  You may also take over the counter acetaminophen and/or NSAIDS (ibuprofen, Aleve, Advil, Motrin) per the package instructions.  You may also use ice to the wound to decrease pain and swelling. You may alternate 20 minutes on and 20 minutes off with the ice for the first 24-48 hours. Make sure you place a washcloth or towel between the ice pack and your skin.  Please note that narcotic pain medication cannot be refilled unless you are seen by a doctor. Make sure you call the office if you are running low on medication or if the dose you have been prescribed is not working well for you.    ACTIVITY:  After discharge from the hospital, you may resume full routine activities; however, there should be no heavy lifting (greater than 20 pounds or a bag of groceries) and no strenuous activities for at least 2 weeks. The duration may be longer, depending on your surgical procedure. Routine activities of daily living are acceptable. Activity level should be addressed at your post-op follow up appointment. You may drive whenever you are off pain medications and are able to perform the activities needed to drive, i.e., turning, bending, twisting,  etc.      WOUND CARE:  You may shower, but do not submerge in a bath for at least two weeks. If you have wound dressings, they may come off after 48 hours. If you have skin glue to the wound, this will fall off on its own, do not pick at it. If you have steri strips to the wound, these will fall off on their own, do not pick at them, may trim the edges if needed.      DIET:  Upon discharge from the hospital, you may resume your normal preoperative diet, unless specifically directed otherwise. Depending on how you are feeling and whether you have nausea or not, you may wish to stay with a bland diet for the first few days. However, you can advance this as quickly as you feel ready.      FOLLOW UP:  Chinedu Campos M.D.  [address] [facility] [address]  [place] [id number] [phone]    Follow up  As needed    Puneet Fung M.D.  [address] [facility] [address]  [place] [id number] [phone]    Schedule an appointment as soon as possible for a visit in 1 week(s)      Call the office if you have: (1) Fevers to more than 101F, (2) Unusual chest or leg pain, (3) Drainage or fluid from incision that may be foul smelling, increased tenderness or soreness at the wound or the wound edges are no longer together, redness or swelling at the incision site. Do not hesitate to call with any other questions.    TIME SPENT ON DISCHARGE: 28 minutes      ____________________________________________  SHELBY Alves.    DD: 11/26/2023 8:01 AM

## 2023-11-27 ENCOUNTER — APPOINTMENT (OUTPATIENT)
Dept: RADIOLOGY | Facility: MEDICAL CENTER | Age: 38
DRG: 394 | End: 2023-11-27
Attending: EMERGENCY MEDICINE
Payer: COMMERCIAL

## 2023-11-27 ENCOUNTER — HOSPITAL ENCOUNTER (INPATIENT)
Facility: MEDICAL CENTER | Age: 38
LOS: 3 days | DRG: 394 | End: 2023-11-30
Attending: EMERGENCY MEDICINE | Admitting: SURGERY
Payer: COMMERCIAL

## 2023-11-27 DIAGNOSIS — K83.8 BILE DUCT LEAK: ICD-10-CM

## 2023-11-27 DIAGNOSIS — R10.84 GENERALIZED ABDOMINAL PAIN: ICD-10-CM

## 2023-11-27 DIAGNOSIS — Z98.890 S/P ERCP: ICD-10-CM

## 2023-11-27 DIAGNOSIS — K81.0 ACUTE CHOLECYSTITIS: ICD-10-CM

## 2023-11-27 PROBLEM — R10.9 ABDOMINAL PAIN: Status: ACTIVE | Noted: 2023-11-27

## 2023-11-27 LAB
ALBUMIN SERPL BCP-MCNC: 4.5 G/DL (ref 3.2–4.9)
ALBUMIN/GLOB SERPL: 1.4 G/DL
ALP SERPL-CCNC: 160 U/L (ref 30–99)
ALT SERPL-CCNC: 767 U/L (ref 2–50)
ANION GAP SERPL CALC-SCNC: 15 MMOL/L (ref 7–16)
APPEARANCE UR: CLEAR
AST SERPL-CCNC: 226 U/L (ref 12–45)
BASOPHILS # BLD AUTO: 0.1 % (ref 0–1.8)
BASOPHILS # BLD: 0.02 K/UL (ref 0–0.12)
BILIRUB SERPL-MCNC: 1.2 MG/DL (ref 0.1–1.5)
BILIRUB UR QL STRIP.AUTO: NEGATIVE
BUN SERPL-MCNC: 11 MG/DL (ref 8–22)
CALCIUM ALBUM COR SERPL-MCNC: 8.7 MG/DL (ref 8.5–10.5)
CALCIUM SERPL-MCNC: 9.1 MG/DL (ref 8.5–10.5)
CHLORIDE SERPL-SCNC: 103 MMOL/L (ref 96–112)
CO2 SERPL-SCNC: 19 MMOL/L (ref 20–33)
COLOR UR: YELLOW
CREAT SERPL-MCNC: 0.67 MG/DL (ref 0.5–1.4)
EKG IMPRESSION: NORMAL
EOSINOPHIL # BLD AUTO: 0.01 K/UL (ref 0–0.51)
EOSINOPHIL NFR BLD: 0.1 % (ref 0–6.9)
ERYTHROCYTE [DISTWIDTH] IN BLOOD BY AUTOMATED COUNT: 42.6 FL (ref 35.9–50)
GFR SERPLBLD CREATININE-BSD FMLA CKD-EPI: 115 ML/MIN/1.73 M 2
GLOBULIN SER CALC-MCNC: 3.2 G/DL (ref 1.9–3.5)
GLUCOSE SERPL-MCNC: 102 MG/DL (ref 65–99)
GLUCOSE UR STRIP.AUTO-MCNC: NEGATIVE MG/DL
HCG SERPL QL: NEGATIVE
HCT VFR BLD AUTO: 42 % (ref 37–47)
HGB BLD-MCNC: 14.6 G/DL (ref 12–16)
IMM GRANULOCYTES # BLD AUTO: 0.1 K/UL (ref 0–0.11)
IMM GRANULOCYTES NFR BLD AUTO: 0.6 % (ref 0–0.9)
KETONES UR STRIP.AUTO-MCNC: 40 MG/DL
LEUKOCYTE ESTERASE UR QL STRIP.AUTO: NEGATIVE
LIPASE SERPL-CCNC: 41 U/L (ref 11–82)
LYMPHOCYTES # BLD AUTO: 0.98 K/UL (ref 1–4.8)
LYMPHOCYTES NFR BLD: 5.8 % (ref 22–41)
MCH RBC QN AUTO: 32.2 PG (ref 27–33)
MCHC RBC AUTO-ENTMCNC: 34.8 G/DL (ref 32.2–35.5)
MCV RBC AUTO: 92.7 FL (ref 81.4–97.8)
MICRO URNS: ABNORMAL
MONOCYTES # BLD AUTO: 1.33 K/UL (ref 0–0.85)
MONOCYTES NFR BLD AUTO: 7.8 % (ref 0–13.4)
NEUTROPHILS # BLD AUTO: 14.6 K/UL (ref 1.82–7.42)
NEUTROPHILS NFR BLD: 85.6 % (ref 44–72)
NITRITE UR QL STRIP.AUTO: NEGATIVE
NRBC # BLD AUTO: 0 K/UL
NRBC BLD-RTO: 0 /100 WBC (ref 0–0.2)
PATHOLOGY CONSULT NOTE: NORMAL
PH UR STRIP.AUTO: 5.5 [PH] (ref 5–8)
PLATELET # BLD AUTO: 223 K/UL (ref 164–446)
PMV BLD AUTO: 10.1 FL (ref 9–12.9)
POTASSIUM SERPL-SCNC: 3.9 MMOL/L (ref 3.6–5.5)
PROT SERPL-MCNC: 7.7 G/DL (ref 6–8.2)
PROT UR QL STRIP: NEGATIVE MG/DL
RBC # BLD AUTO: 4.53 M/UL (ref 4.2–5.4)
RBC UR QL AUTO: NEGATIVE
SODIUM SERPL-SCNC: 137 MMOL/L (ref 135–145)
SP GR UR STRIP.AUTO: 1.03
TROPONIN T SERPL-MCNC: <6 NG/L (ref 6–19)
UROBILINOGEN UR STRIP.AUTO-MCNC: 0.2 MG/DL
WBC # BLD AUTO: 17 K/UL (ref 4.8–10.8)

## 2023-11-27 PROCEDURE — 74177 CT ABD & PELVIS W/CONTRAST: CPT

## 2023-11-27 PROCEDURE — 93005 ELECTROCARDIOGRAM TRACING: CPT

## 2023-11-27 PROCEDURE — 700105 HCHG RX REV CODE 258: Performed by: EMERGENCY MEDICINE

## 2023-11-27 PROCEDURE — 700117 HCHG RX CONTRAST REV CODE 255: Performed by: EMERGENCY MEDICINE

## 2023-11-27 PROCEDURE — 96374 THER/PROPH/DIAG INJ IV PUSH: CPT

## 2023-11-27 PROCEDURE — 99222 1ST HOSP IP/OBS MODERATE 55: CPT | Mod: A1 | Performed by: SURGERY

## 2023-11-27 PROCEDURE — 93005 ELECTROCARDIOGRAM TRACING: CPT | Performed by: EMERGENCY MEDICINE

## 2023-11-27 PROCEDURE — 700105 HCHG RX REV CODE 258: Performed by: SURGERY

## 2023-11-27 PROCEDURE — 80053 COMPREHEN METABOLIC PANEL: CPT

## 2023-11-27 PROCEDURE — 770001 HCHG ROOM/CARE - MED/SURG/GYN PRIV*

## 2023-11-27 PROCEDURE — 84484 ASSAY OF TROPONIN QUANT: CPT

## 2023-11-27 PROCEDURE — 700111 HCHG RX REV CODE 636 W/ 250 OVERRIDE (IP): Mod: JZ | Performed by: EMERGENCY MEDICINE

## 2023-11-27 PROCEDURE — 83690 ASSAY OF LIPASE: CPT

## 2023-11-27 PROCEDURE — 99285 EMERGENCY DEPT VISIT HI MDM: CPT

## 2023-11-27 PROCEDURE — 700102 HCHG RX REV CODE 250 W/ 637 OVERRIDE(OP): Performed by: SURGERY

## 2023-11-27 PROCEDURE — 36415 COLL VENOUS BLD VENIPUNCTURE: CPT

## 2023-11-27 PROCEDURE — A9537 TC99M MEBROFENIN: HCPCS

## 2023-11-27 PROCEDURE — A9270 NON-COVERED ITEM OR SERVICE: HCPCS | Performed by: SURGERY

## 2023-11-27 PROCEDURE — 96375 TX/PRO/DX INJ NEW DRUG ADDON: CPT

## 2023-11-27 PROCEDURE — 700111 HCHG RX REV CODE 636 W/ 250 OVERRIDE (IP): Mod: JZ | Performed by: SURGERY

## 2023-11-27 PROCEDURE — 85025 COMPLETE CBC W/AUTO DIFF WBC: CPT

## 2023-11-27 PROCEDURE — 81003 URINALYSIS AUTO W/O SCOPE: CPT

## 2023-11-27 PROCEDURE — 84703 CHORIONIC GONADOTROPIN ASSAY: CPT

## 2023-11-27 PROCEDURE — 99254 IP/OBS CNSLTJ NEW/EST MOD 60: CPT | Performed by: INTERNAL MEDICINE

## 2023-11-27 RX ORDER — ENEMA 19; 7 G/133ML; G/133ML
1 ENEMA RECTAL
Status: DISCONTINUED | OUTPATIENT
Start: 2023-11-27 | End: 2023-11-30 | Stop reason: HOSPADM

## 2023-11-27 RX ORDER — IBUPROFEN 800 MG/1
800 TABLET ORAL 3 TIMES DAILY PRN
Status: DISCONTINUED | OUTPATIENT
Start: 2023-11-30 | End: 2023-11-27

## 2023-11-27 RX ORDER — DOCUSATE SODIUM 100 MG/1
100 CAPSULE, LIQUID FILLED ORAL 2 TIMES DAILY
Status: DISCONTINUED | OUTPATIENT
Start: 2023-11-27 | End: 2023-11-30 | Stop reason: HOSPADM

## 2023-11-27 RX ORDER — POLYETHYLENE GLYCOL 3350 17 G/17G
1 POWDER, FOR SOLUTION ORAL 2 TIMES DAILY
Status: DISCONTINUED | OUTPATIENT
Start: 2023-11-27 | End: 2023-11-30 | Stop reason: HOSPADM

## 2023-11-27 RX ORDER — SODIUM CHLORIDE, SODIUM LACTATE, POTASSIUM CHLORIDE, CALCIUM CHLORIDE 600; 310; 30; 20 MG/100ML; MG/100ML; MG/100ML; MG/100ML
INJECTION, SOLUTION INTRAVENOUS CONTINUOUS
Status: DISCONTINUED | OUTPATIENT
Start: 2023-11-27 | End: 2023-11-30 | Stop reason: HOSPADM

## 2023-11-27 RX ORDER — MORPHINE SULFATE 4 MG/ML
4 INJECTION INTRAVENOUS
Status: DISCONTINUED | OUTPATIENT
Start: 2023-11-27 | End: 2023-11-30 | Stop reason: HOSPADM

## 2023-11-27 RX ORDER — AMOXICILLIN 250 MG
1 CAPSULE ORAL NIGHTLY
Status: DISCONTINUED | OUTPATIENT
Start: 2023-11-27 | End: 2023-11-30 | Stop reason: HOSPADM

## 2023-11-27 RX ORDER — ONDANSETRON 4 MG/1
4 TABLET, ORALLY DISINTEGRATING ORAL EVERY 4 HOURS PRN
Status: DISCONTINUED | OUTPATIENT
Start: 2023-11-27 | End: 2023-11-30 | Stop reason: HOSPADM

## 2023-11-27 RX ORDER — MORPHINE SULFATE 4 MG/ML
2 INJECTION INTRAVENOUS
Status: DISCONTINUED | OUTPATIENT
Start: 2023-11-27 | End: 2023-11-30 | Stop reason: HOSPADM

## 2023-11-27 RX ORDER — BISACODYL 10 MG
10 SUPPOSITORY, RECTAL RECTAL
Status: DISCONTINUED | OUTPATIENT
Start: 2023-11-27 | End: 2023-11-30 | Stop reason: HOSPADM

## 2023-11-27 RX ORDER — SODIUM CHLORIDE 9 MG/ML
1000 INJECTION, SOLUTION INTRAVENOUS ONCE
Status: COMPLETED | OUTPATIENT
Start: 2023-11-27 | End: 2023-11-27

## 2023-11-27 RX ORDER — ONDANSETRON 2 MG/ML
4 INJECTION INTRAMUSCULAR; INTRAVENOUS ONCE
Status: COMPLETED | OUTPATIENT
Start: 2023-11-27 | End: 2023-11-27

## 2023-11-27 RX ORDER — ONDANSETRON 2 MG/ML
4 INJECTION INTRAMUSCULAR; INTRAVENOUS EVERY 4 HOURS PRN
Status: DISCONTINUED | OUTPATIENT
Start: 2023-11-27 | End: 2023-11-30 | Stop reason: HOSPADM

## 2023-11-27 RX ORDER — AMOXICILLIN 250 MG
1 CAPSULE ORAL
Status: DISCONTINUED | OUTPATIENT
Start: 2023-11-27 | End: 2023-11-30 | Stop reason: HOSPADM

## 2023-11-27 RX ORDER — KETOROLAC TROMETHAMINE 30 MG/ML
15 INJECTION, SOLUTION INTRAMUSCULAR; INTRAVENOUS ONCE
Status: COMPLETED | OUTPATIENT
Start: 2023-11-27 | End: 2023-11-27

## 2023-11-27 RX ORDER — KETOROLAC TROMETHAMINE 30 MG/ML
30 INJECTION, SOLUTION INTRAMUSCULAR; INTRAVENOUS EVERY 6 HOURS
Status: DISCONTINUED | OUTPATIENT
Start: 2023-11-27 | End: 2023-11-27

## 2023-11-27 RX ADMIN — DOCUSATE SODIUM 50 MG AND SENNOSIDES 8.6 MG 1 TABLET: 8.6; 5 TABLET, FILM COATED ORAL at 21:08

## 2023-11-27 RX ADMIN — MORPHINE SULFATE 4 MG: 4 INJECTION, SOLUTION INTRAMUSCULAR; INTRAVENOUS at 14:51

## 2023-11-27 RX ADMIN — PIPERACILLIN AND TAZOBACTAM 3.38 G: 3; .375 INJECTION, POWDER, FOR SOLUTION INTRAVENOUS at 10:32

## 2023-11-27 RX ADMIN — MORPHINE SULFATE 4 MG: 4 INJECTION, SOLUTION INTRAMUSCULAR; INTRAVENOUS at 21:07

## 2023-11-27 RX ADMIN — IOHEXOL 90 ML: 350 INJECTION, SOLUTION INTRAVENOUS at 06:15

## 2023-11-27 RX ADMIN — PIPERACILLIN AND TAZOBACTAM 3.38 G: 3; .375 INJECTION, POWDER, FOR SOLUTION INTRAVENOUS at 13:55

## 2023-11-27 RX ADMIN — KETOROLAC TROMETHAMINE 30 MG: 30 INJECTION, SOLUTION INTRAMUSCULAR; INTRAVENOUS at 11:37

## 2023-11-27 RX ADMIN — DOCUSATE SODIUM 100 MG: 100 CAPSULE, LIQUID FILLED ORAL at 10:33

## 2023-11-27 RX ADMIN — PIPERACILLIN AND TAZOBACTAM 3.38 G: 3; .375 INJECTION, POWDER, FOR SOLUTION INTRAVENOUS at 21:12

## 2023-11-27 RX ADMIN — SODIUM CHLORIDE 1000 ML: 9 INJECTION, SOLUTION INTRAVENOUS at 05:00

## 2023-11-27 RX ADMIN — ONDANSETRON 4 MG: 4 TABLET, ORALLY DISINTEGRATING ORAL at 21:15

## 2023-11-27 RX ADMIN — ONDANSETRON 4 MG: 2 INJECTION INTRAMUSCULAR; INTRAVENOUS at 05:00

## 2023-11-27 RX ADMIN — MAGNESIUM HYDROXIDE 30 ML: 1200 LIQUID ORAL at 10:33

## 2023-11-27 RX ADMIN — KETOROLAC TROMETHAMINE 15 MG: 30 INJECTION, SOLUTION INTRAMUSCULAR; INTRAVENOUS at 05:15

## 2023-11-27 RX ADMIN — MORPHINE SULFATE 4 MG: 4 INJECTION, SOLUTION INTRAMUSCULAR; INTRAVENOUS at 10:24

## 2023-11-27 RX ADMIN — POLYETHYLENE GLYCOL 3350 1 PACKET: 17 POWDER, FOR SOLUTION ORAL at 10:33

## 2023-11-27 RX ADMIN — FENTANYL CITRATE 50 MCG: 50 INJECTION, SOLUTION INTRAMUSCULAR; INTRAVENOUS at 06:55

## 2023-11-27 ASSESSMENT — LIFESTYLE VARIABLES
EVER FELT BAD OR GUILTY ABOUT YOUR DRINKING: NO
EVER HAD A DRINK FIRST THING IN THE MORNING TO STEADY YOUR NERVES TO GET RID OF A HANGOVER: NO
TOTAL SCORE: 0
HAVE YOU EVER FELT YOU SHOULD CUT DOWN ON YOUR DRINKING: NO
HOW MANY TIMES IN THE PAST YEAR HAVE YOU HAD 5 OR MORE DRINKS IN A DAY: 0
AVERAGE NUMBER OF DAYS PER WEEK YOU HAVE A DRINK CONTAINING ALCOHOL: 0
TOTAL SCORE: 0
ON A TYPICAL DAY WHEN YOU DRINK ALCOHOL HOW MANY DRINKS DO YOU HAVE: 0
ALCOHOL_USE: NO
CONSUMPTION TOTAL: NEGATIVE
HAVE PEOPLE ANNOYED YOU BY CRITICIZING YOUR DRINKING: NO
DO YOU DRINK ALCOHOL: NO
TOTAL SCORE: 0
DOES PATIENT WANT TO STOP DRINKING: NO

## 2023-11-27 ASSESSMENT — PAIN DESCRIPTION - DESCRIPTORS: DESCRIPTORS: THROBBING

## 2023-11-27 ASSESSMENT — COGNITIVE AND FUNCTIONAL STATUS - GENERAL
CLIMB 3 TO 5 STEPS WITH RAILING: A LITTLE
HELP NEEDED FOR BATHING: A LITTLE
MOVING FROM LYING ON BACK TO SITTING ON SIDE OF FLAT BED: A LITTLE
WALKING IN HOSPITAL ROOM: A LITTLE
SUGGESTED CMS G CODE MODIFIER DAILY ACTIVITY: CJ
TURNING FROM BACK TO SIDE WHILE IN FLAT BAD: A LITTLE
MOBILITY SCORE: 18
SUGGESTED CMS G CODE MODIFIER MOBILITY: CK
DRESSING REGULAR LOWER BODY CLOTHING: A LITTLE
MOVING TO AND FROM BED TO CHAIR: A LITTLE
STANDING UP FROM CHAIR USING ARMS: A LITTLE
DAILY ACTIVITIY SCORE: 22

## 2023-11-27 ASSESSMENT — PAIN DESCRIPTION - PAIN TYPE
TYPE: ACUTE PAIN

## 2023-11-27 ASSESSMENT — COPD QUESTIONNAIRES
COPD SCREENING SCORE: 0
HAVE YOU SMOKED AT LEAST 100 CIGARETTES IN YOUR ENTIRE LIFE: NO/DON'T KNOW
DURING THE PAST 4 WEEKS HOW MUCH DID YOU FEEL SHORT OF BREATH: NONE/LITTLE OF THE TIME
DO YOU EVER COUGH UP ANY MUCUS OR PHLEGM?: NO/ONLY WITH OCCASIONAL COLDS OR INFECTIONS

## 2023-11-27 ASSESSMENT — PATIENT HEALTH QUESTIONNAIRE - PHQ9
2. FEELING DOWN, DEPRESSED, IRRITABLE, OR HOPELESS: NOT AT ALL
SUM OF ALL RESPONSES TO PHQ9 QUESTIONS 1 AND 2: 0
1. LITTLE INTEREST OR PLEASURE IN DOING THINGS: NOT AT ALL

## 2023-11-27 ASSESSMENT — FIBROSIS 4 INDEX: FIB4 SCORE: 5

## 2023-11-27 NOTE — CONSULTS
Gastroenterology Initial Consult Note               Author:  Annie Hoover M.D. Date & Time Created: 11/27/2023 1:15 PM       Patient ID:  Name:             Poly Adams    YOB: 1985  Age:                 38 y.o.  female  MRN:               [id number]      Referring Provider:  Shante Bowers MD        Presenting Chief Complaint:  Bile leak      History of Present Illness:    This is a very pleasant 38 y.o. female s/p laparoscopic cholecystectomy 11/25/23 for exacerbation of chronic cholecystitis.  Liver tests that am were , ,  with bilirubin 1.0.  US showed cholelithiasis but no common bile duct dilation.    Patient presented to ER this morning for feeling dizzy and lightheaded with shortness of breath.  She was complaining of abdominal pain and diaphoresis. WBC 17, , ,  and bilirubin 1.2.  CT abd/pelvis with shows fluid collection in gallbladder fossa, 2.3 x 4.5 cm. HIDA scan confirmed bile leak and no biliary obstruction.     Family member at bedside and acting as .  Patient continue to have upper abdominal pain.        Review of Systems:  Review of Systems   Unable to perform ROS: Language             Past Medical History:  Past Medical History:   Diagnosis Date    Cholecystitis      Active Hospital Problems    Diagnosis     Abdominal pain [R10.9]          Past Surgical History:  Past Surgical History:   Procedure Laterality Date    NESS BY LAPAROSCOPY  11/25/2023    Procedure: CHOLECYSTECTOMY, LAPAROSCOPIC;  Surgeon: Chinedu Campos M.D.;  Location: SURGERY [facility];  Service: [facility] Medications:  Current Facility-Administered Medications   Medication Dose Frequency Provider Last Rate Last Admin    Respiratory Therapy Consult   Continuous RT Shante Bowers M.D.        Pharmacy Consult Request ...Pain Management Review 1 Each  1 Each PHARMACY TO DOSE Shante Bowers M.D.        ondansetron (Zofran) syringe/vial  injection 4 mg  4 mg Q4HRS PRN Shante Bowers M.D.        ondansetron (Zofran ODT) dispertab 4 mg  4 mg Q4HRS PRN Shante Bowers M.D.        docusate sodium (Colace) capsule 100 mg  100 mg BID Shante Bowers M.D.   100 mg at 11/27/23 1033    senna-docusate (Pericolace Or Senokot S) 8.6-50 MG per tablet 1 Tablet  1 Tablet Nightly Shante Bowers M.D.        senna-docusate (Pericolace Or Senokot S) 8.6-50 MG per tablet 1 Tablet  1 Tablet Q24HRS PRN Shante Bowers M.D.        polyethylene glycol/lytes (Miralax) PACKET 1 Packet  1 Packet BID Shante Bowers M.D.   1 Packet at 11/27/23 1033    magnesium hydroxide (Milk Of Magnesia) suspension 30 mL  30 mL DAILY Shante Bowers M.D.   30 mL at 11/27/23 1033    bisacodyl (Dulcolax) suppository 10 mg  10 mg Q24HRS PRN Shante Bowers M.D.        sodium phosphate (Fleet) enema 133 mL  1 Each Once PRN Shante Bowers M.D.        LR infusion   Continuous Shante Bowers M.D.   Held at 11/27/23 1032    ketorolac (Toradol) injection 30 mg  30 mg Q6HRS Shante Bowers M.D.   30 mg at 11/27/23 1137    Followed by    [START ON 11/30/2023] ibuprofen (Motrin) tablet 800 mg  800 mg TID PRN Shante Bowers M.D.        morphine 4 MG/ML injection 2 mg  2 mg Q HOUR PRN Shante Bowers M.D.        Or    morphine 4 MG/ML injection 4 mg  4 mg Q HOUR PRN Shante Bowers M.D.   4 mg at 11/27/23 1024    piperacillin-tazobactam (Zosyn) 3.375 g in  mL IVPB  3.375 g Q8HRS Shante M Hulka, M.D.       Last reviewed on 11/27/2023  8:16 AM by Leeann Jeff       Current Outpatient Medications:  Medications Prior to Admission   Medication Sig Dispense Refill Last Dose    acetaminophen (TYLENOL) 500 MG Tab Take 2 Tablets by mouth every 6 hours. 30 Tablet 0     cimetidine (TAGAMET) 300 MG Tab Take 300 mg by mouth every evening.       therapeutic multivitamin-minerals (THERAGRAN-M) Tab Take 1 Tablet by mouth every day.       ibuprofen (MOTRIN) 800 MG Tab Take 1 Tablet by mouth every 8  "hours as needed for Mild Pain, Moderate Pain or Inflammation. 30 Tablet      oxyCODONE-acetaminophen (PERCOCET) 5-325 MG Tab Take 1-2 Tablets by mouth every four hours as needed for Severe Pain for up to 3 days. 15 Tablet 0     omeprazole (PRILOSEC) 20 MG delayed-release capsule Take 20 mg by mouth every day.       IBSRELA 50 MG Tab Take 50 mg by mouth 2 times a day.            Medication Allergies:  No Known Allergies      Family Medical History:  Family History   Problem Relation Age of Onset    No Known Problems Mother     No Known Problems Father          Social History:  Social History     Socioeconomic History    Marital status:      Spouse name: Not on file    Number of children: Not on file    Years of education: Not on file    Highest education level: Not on file   Occupational History    Not on file   Tobacco Use    Smoking status: Never    Smokeless tobacco: Never   Vaping Use    Vaping Use: Never used   Substance and Sexual Activity    Alcohol use: No    Drug use: No    Sexual activity: Yes     Partners: Male     Birth control/protection: None   Other Topics Concern    Not on file   Social History Narrative    Not on file     Social Determinants of Health     Financial Resource Strain: Not on file   Food Insecurity: Not on file   Transportation Needs: Not on file   Physical Activity: Not on file   Stress: Not on file   Social Connections: Not on file   Intimate Partner Violence: Not on file   Housing Stability: Not on file         Vital signs:  Weight/BMI: Body mass index is 26.81 kg/m².  /70   Pulse 80   Temp 36.8 °C (98.2 °F) (Temporal)   Resp 18   Ht 1.575 m (5' 2\")   Wt 66.5 kg (146 lb 9.7 oz)   SpO2 97%   Vitals:    11/27/23 0756 11/27/23 0757 11/27/23 0817 11/27/23 1151   BP:  118/65 110/71 114/70   Pulse: 84  80 80   Resp:  18 18 18   Temp:   36.8 °C (98.2 °F) 36.8 °C (98.2 °F)   TempSrc:   Temporal Temporal   SpO2: 97%  98% 97%   Weight:       Height:         Oxygen Therapy:  " Pulse Oximetry: 97 %, O2 (LPM): 0, O2 Delivery Device: None - Room Air    Intake/Output Summary (Last 24 hours) at 11/27/2023 1315  Last data filed at 11/27/2023 0800  Gross per 24 hour   Intake 0 ml   Output --   Net 0 ml         Physical Exam:  Physical Exam  Constitutional:       General: She is not in acute distress.     Appearance: Normal appearance. She is not ill-appearing, toxic-appearing or diaphoretic.   HENT:      Head: Normocephalic and atraumatic.      Nose: Nose normal.      Mouth/Throat:      Mouth: Mucous membranes are moist.   Eyes:      General: No scleral icterus.     Pupils: Pupils are equal, round, and reactive to light.   Cardiovascular:      Rate and Rhythm: Regular rhythm.      Heart sounds: Normal heart sounds.   Pulmonary:      Effort: Pulmonary effort is normal.      Breath sounds: Normal breath sounds.   Abdominal:      General: Bowel sounds are normal.      Palpations: Abdomen is soft.      Tenderness: There is abdominal tenderness. There is no guarding.   Musculoskeletal:      Cervical back: Neck supple.   Lymphadenopathy:      Cervical: No cervical adenopathy.   Skin:     General: Skin is warm and dry.   Neurological:      Mental Status: She is alert.                 Labs:  Recent Labs     11/25/23 0115 11/27/23  0455   SODIUM 138 137   POTASSIUM 4.2 3.9   CHLORIDE 106 103   CO2 20 19*   BUN 11 11   CREATININE 0.69 0.67   CALCIUM 9.0 9.1     Recent Labs     11/25/23 0115 11/27/23 0455   ALTSGPT 470* 767*   ASTSGOT 603* 226*   ALKPHOSPHAT 106* 160*   TBILIRUBIN 1.0 1.2   LIPASE 39 41   GLUCOSE 108* 102*     Recent Labs     11/25/23 0115 11/26/23  0221 11/27/23  0455   WBC 8.7 10.8 17.0*   NEUTSPOLYS 62.50 70.40 85.60*   LYMPHOCYTES 24.90 16.00* 5.80*   MONOCYTES 11.30 12.90 7.80   EOSINOPHILS 0.70 0.10 0.10   BASOPHILS 0.30 0.20 0.10   ASTSGOT 603*  --  226*   ALTSGPT 470*  --  767*   ALKPHOSPHAT 106*  --  160*   TBILIRUBIN 1.0  --  1.2     Recent Labs     11/25/23 0115  23  0221 23  0455   RBC 4.07* 3.63* 4.53   HEMOGLOBIN 13.3 11.7* 14.6   HEMATOCRIT 37.2 33.4* 42.0   PLATELETCT 236 206 223     Recent Results (from the past 24 hour(s))   EKG (NOW)    Collection Time: 23  4:08 AM   Result Value Ref Range    Report       [facility]    Test Date:  2023  Pt Name:    HUSEYIN COLBY          Department: ER  MRN:        [id number]                      Room:  Gender:     Female                       Technician: [id number]  :        1985                   Requested By:ER TRIAGE PROTOCOL  Order #:    [id number]                    Reading MD:    Measurements  Intervals                                Axis  Rate:       69                           P:          19  AL:         145                          QRS:        48  QRSD:       85                           T:          -36  QT:         490  QTc:        525    Interpretive Statements  Sinus rhythm  Borderline T abnormalities, diffuse leads  Prolonged QT interval  Compared to ECG 2023 00:44:51  Prolonged QT interval now present  T-wave abnormality still present     CBC WITH DIFFERENTIAL    Collection Time: 23  4:55 AM   Result Value Ref Range    WBC 17.0 (H) 4.8 - 10.8 K/uL    RBC 4.53 4.20 - 5.40 M/uL    Hemoglobin 14.6 12.0 - 16.0 g/dL    Hematocrit 42.0 37.0 - 47.0 %    MCV 92.7 81.4 - 97.8 fL    MCH 32.2 27.0 - 33.0 pg    MCHC 34.8 32.2 - 35.5 g/dL    RDW 42.6 35.9 - 50.0 fL    Platelet Count 223 164 - 446 K/uL    MPV 10.1 9.0 - 12.9 fL    Neutrophils-Polys 85.60 (H) 44.00 - 72.00 %    Lymphocytes 5.80 (L) 22.00 - 41.00 %    Monocytes 7.80 0.00 - 13.40 %    Eosinophils 0.10 0.00 - 6.90 %    Basophils 0.10 0.00 - 1.80 %    Immature Granulocytes 0.60 0.00 - 0.90 %    Nucleated RBC 0.00 0.00 - 0.20 /100 WBC    Neutrophils (Absolute) 14.60 (H) 1.82 - 7.42 K/uL    Lymphs (Absolute) 0.98 (L) 1.00 - 4.80 K/uL    Monos (Absolute) 1.33 (H) 0.00 - 0.85 K/uL    Eos  (Absolute) 0.01 0.00 - 0.51 K/uL    Baso (Absolute) 0.02 0.00 - 0.12 K/uL    Immature Granulocytes (abs) 0.10 0.00 - 0.11 K/uL    NRBC (Absolute) 0.00 K/uL   COMP METABOLIC PANEL    Collection Time: 11/27/23  4:55 AM   Result Value Ref Range    Sodium 137 135 - 145 mmol/L    Potassium 3.9 3.6 - 5.5 mmol/L    Chloride 103 96 - 112 mmol/L    Co2 19 (L) 20 - 33 mmol/L    Anion Gap 15.0 7.0 - 16.0    Glucose 102 (H) 65 - 99 mg/dL    Bun 11 8 - 22 mg/dL    Creatinine 0.67 0.50 - 1.40 mg/dL    Calcium 9.1 8.5 - 10.5 mg/dL    Correct Calcium 8.7 8.5 - 10.5 mg/dL    AST(SGOT) 226 (H) 12 - 45 U/L    ALT(SGPT) 767 (H) 2 - 50 U/L    Alkaline Phosphatase 160 (H) 30 - 99 U/L    Total Bilirubin 1.2 0.1 - 1.5 mg/dL    Albumin 4.5 3.2 - 4.9 g/dL    Total Protein 7.7 6.0 - 8.2 g/dL    Globulin 3.2 1.9 - 3.5 g/dL    A-G Ratio 1.4 g/dL   LIPASE    Collection Time: 11/27/23  4:55 AM   Result Value Ref Range    Lipase 41 11 - 82 U/L   TROPONIN    Collection Time: 11/27/23  4:55 AM   Result Value Ref Range    Troponin T <6 6 - 19 ng/L   HCG QUAL SERUM    Collection Time: 11/27/23  4:55 AM   Result Value Ref Range    Beta-Hcg Qualitative Serum Negative Negative   ESTIMATED GFR    Collection Time: 11/27/23  4:55 AM   Result Value Ref Range    GFR (CKD-EPI) 115 >60 mL/min/1.73 m 2   URINALYSIS    Collection Time: 11/27/23  7:30 AM    Specimen: Urine   Result Value Ref Range    Color Yellow     Character Clear     Specific Gravity 1.032 <1.035    Ph 5.5 5.0 - 8.0    Glucose Negative Negative mg/dL    Ketones 40 (A) Negative mg/dL    Protein Negative Negative mg/dL    Bilirubin Negative Negative    Urobilinogen, Urine 0.2 Negative    Nitrite Negative Negative    Leukocyte Esterase Negative Negative    Occult Blood Negative Negative    Micro Urine Req see below          Radiology Review:  NM-HEPATOBILIARY SCAN   Final Result      Small focal accumulation of radiotracer in the gallbladder fossa suggesting bile leak.      No findings of biliary  obstruction.      CT-ABDOMEN-PELVIS WITH   Final Result         1.  Intra-abdominal free air, nonspecific and within expected limits for recent postop changes. Radiographic appearance cannot exclude component of viscus perforation.   2.  Small free fluid collection the gallbladder fossa, could represent postop hematoma, seroma, biloma, or early forming abscess   3.  Small quantity of perihepatic and pelvic ascites   4.  Fibroid uterine changes            MDM (Data Review):   -Records reviewed and summarized in current documentation  -I personally reviewed and interpreted the laboratory results  -I personally reviewed the radiology images    Assessment/Recommendations:    Impression:   Bile leak   S/p laparoscopic cholecystectomy   Abnormal liver tests   Leukocytosis      Recs:  NPO at [facility]  ERCP tomorrow at 4:00 with Dr. Davis  No NSAIDS due to risk of bleeding with sphincterotomy  On Zosyn      Annie Hoover M.D.          Core Quality Measures   Reviewed items:  Labs, Medications and Radiology reports reviewed

## 2023-11-27 NOTE — CARE PLAN
The patient is Stable - Low risk of patient condition declining or worsening    Shift Goals  Clinical Goals: pain control, antibiotic administration  Patient Goals: pain control, rest  Family Goals: patient comfort    Progress made toward(s) clinical / shift goals:  IV antibiotic regimen has been initiated (see MAR). Pain has been medicated PRN per MAR parameters and also with scheduled medications. Patient's HIDA scan was completed earlier this morning. Regular diet has been initiated.     Patient is not progressing towards the following goals: Pending discharge clearance.    Problem: Pain - Standard  Goal: Alleviation of pain or a reduction in pain to the patient’s comfort goal  11/27/2023 1241 by Ashley Alvarez R.N.  Outcome: Progressing     Problem: Knowledge Deficit - Standard  Goal: Patient and family/care givers will demonstrate understanding of plan of care, disease process/condition, diagnostic tests and medications  11/27/2023 1241 by Ashley Alvarez R.N.  Outcome: Progressing

## 2023-11-27 NOTE — ED PROVIDER NOTES
ED PHYSICIAN NOTE    CHIEF COMPLAINT  Chief Complaint   Patient presents with    Dizziness     Approx 2 hours ago, pt reports standing up and feeling dizzy, lightheaded w/ SOB. Pt also reports feeling diaphoretic    Abdominal Pain     Pt here for ongoing abd pain, recent visits for same, darrion surgery approx 2 days ago       EXTERNAL RECORDS REVIEWED  Inpatient Notes patient recently admitted to hospital with cholecystitis.  Underwent cholecystectomy.  Discharged yesterday.     HPI/ROS  LIMITATION TO HISTORY   Select: Language [place]an,  Used   OUTSIDE HISTORIAN(S):  Family reports the patient had pain this morning and took an oxycodone that did not help    Poly Soto is a 38 y.o. female who presents with ongoing abdominal pain.  Patient was recently hospitalized with cholecystitis.  She underwent cholecystectomy.  She reports the pain has not gotten any better since before her surgery.  She has pain all across her right side.  She has pain both in the upper and lower.  She feels bloated.  She has not had a bowel movement since before her surgery.  She is felt nauseous but has not vomited.  She has a stinging dysuria.  No flank pain.  She has not had a fever, but she did feel dizzy lightheaded and was sweaty at 1 point when she stood up this morning.  No abnormal vaginal bleeding or discharge.  She denies chest pain, ongoing shortness of breath, pleuritic pain, leg swelling, leg pain    PAST MEDICAL HISTORY  Past Medical History:   Diagnosis Date    Cholecystitis        SOCIAL HISTORY  Social History     Tobacco Use    Smoking status: Never    Smokeless tobacco: Never   Vaping Use    Vaping Use: Never used   Substance Use Topics    Alcohol use: No    Drug use: No       CURRENT MEDICATIONS  Home Medications       Reviewed by Mary Crespo R.N. (Registered Nurse) on 11/27/23 at 0414  Med List Status: Partial     Medication Last Dose Status   acetaminophen (TYLENOL) 500 MG Tab  Active  "  cimetidine (TAGAMET) 300 MG Tab  Active   IBSRELA 50 MG Tab  Active   ibuprofen (MOTRIN) 800 MG Tab  Active   omeprazole (PRILOSEC) 20 MG delayed-release capsule  Active   oxyCODONE-acetaminophen (PERCOCET) 5-325 MG Tab  Active   therapeutic multivitamin-minerals (THERAGRAN-M) Tab  Active                    ALLERGIES  No Known Allergies    PHYSICAL EXAM  VITAL SIGNS: /64   Pulse 75   Temp 36.2 °C (97.2 °F) (Temporal)   Resp 18   Ht 1.575 m (5' 2\")   Wt 66.5 kg (146 lb 9.7 oz)   LMP 10/13/2023 (Approximate)   SpO2 99%   BMI 26.81 kg/m²    Constitutional: Awake and alert.  Appears uncomfortable  HENT: Normal inspection  Eyes: Normal inspection  Neck: Grossly normal range of motion.  Cardiovascular: Normal heart rate, Normal rhythm.  Symmetric peripheral pulses.   Thorax & Lungs: No respiratory distress, No wheezing, No rales, No rhonchi, No chest tenderness.   Abdomen: Surgical wounds clean, dry and intact.  There is diffuse tenderness with increased tenderness over the right abdomen. guarding.  No rebound.  Skin: Wounds appear normal  Extremities: No clubbing, cyanosis, edema, no Homans or cords.  Neurologic: Grossly normal       DIAGNOSTIC STUDIES / PROCEDURES  LABS/EKG  Results for orders placed or performed during the hospital encounter of 11/27/23   CBC WITH DIFFERENTIAL   Result Value Ref Range    WBC 17.0 (H) 4.8 - 10.8 K/uL    RBC 4.53 4.20 - 5.40 M/uL    Hemoglobin 14.6 12.0 - 16.0 g/dL    Hematocrit 42.0 37.0 - 47.0 %    MCV 92.7 81.4 - 97.8 fL    MCH 32.2 27.0 - 33.0 pg    MCHC 34.8 32.2 - 35.5 g/dL    RDW 42.6 35.9 - 50.0 fL    Platelet Count 223 164 - 446 K/uL    MPV 10.1 9.0 - 12.9 fL    Neutrophils-Polys 85.60 (H) 44.00 - 72.00 %    Lymphocytes 5.80 (L) 22.00 - 41.00 %    Monocytes 7.80 0.00 - 13.40 %    Eosinophils 0.10 0.00 - 6.90 %    Basophils 0.10 0.00 - 1.80 %    Immature Granulocytes 0.60 0.00 - 0.90 %    Nucleated RBC 0.00 0.00 - 0.20 /100 WBC    Neutrophils (Absolute) 14.60 (H) " 1.82 - 7.42 K/uL    Lymphs (Absolute) 0.98 (L) 1.00 - 4.80 K/uL    Monos (Absolute) 1.33 (H) 0.00 - 0.85 K/uL    Eos (Absolute) 0.01 0.00 - 0.51 K/uL    Baso (Absolute) 0.02 0.00 - 0.12 K/uL    Immature Granulocytes (abs) 0.10 0.00 - 0.11 K/uL    NRBC (Absolute) 0.00 K/uL   COMP METABOLIC PANEL   Result Value Ref Range    Sodium 137 135 - 145 mmol/L    Potassium 3.9 3.6 - 5.5 mmol/L    Chloride 103 96 - 112 mmol/L    Co2 19 (L) 20 - 33 mmol/L    Anion Gap 15.0 7.0 - 16.0    Glucose 102 (H) 65 - 99 mg/dL    Bun 11 8 - 22 mg/dL    Creatinine 0.67 0.50 - 1.40 mg/dL    Calcium 9.1 8.5 - 10.5 mg/dL    Correct Calcium 8.7 8.5 - 10.5 mg/dL    AST(SGOT) 226 (H) 12 - 45 U/L    ALT(SGPT) 767 (H) 2 - 50 U/L    Alkaline Phosphatase 160 (H) 30 - 99 U/L    Total Bilirubin 1.2 0.1 - 1.5 mg/dL    Albumin 4.5 3.2 - 4.9 g/dL    Total Protein 7.7 6.0 - 8.2 g/dL    Globulin 3.2 1.9 - 3.5 g/dL    A-G Ratio 1.4 g/dL   LIPASE   Result Value Ref Range    Lipase 41 11 - 82 U/L   TROPONIN   Result Value Ref Range    Troponin T <6 6 - 19 ng/L   HCG QUAL SERUM   Result Value Ref Range    Beta-Hcg Qualitative Serum Negative Negative   ESTIMATED GFR   Result Value Ref Range    GFR (CKD-EPI) 115 >60 mL/min/1.73 m 2   EKG (NOW)   Result Value Ref Range    Report       [facility] Dept.    Test Date:  2023  Pt Name:    HUSEYIN COLBY          Department: ER  MRN:        [id number]                      Room:  Gender:     Female                       Technician: [id number]  :        1985                   Requested By:ER TRIAGE PROTOCOL  Order #:    [id number]                    Reading MD:    Measurements  Intervals                                Axis  Rate:       69                           P:          19  DE:         145                          QRS:        48  QRSD:       85                           T:          -36  QT:         490  QTc:        525    Interpretive Statements  Sinus rhythm  Borderline T  abnormalities, diffuse leads  Prolonged QT interval  Compared to ECG 11/25/2023 00:44:51  Prolonged QT interval now present  T-wave abnormality still present            RADIOLOGY  I have independently interpreted the diagnostic imaging associated with this visit and am waiting the final reading from the radiologist.   My preliminary interpretation is as follows: CT abdomen without appendicitis.  Gallbladder fossa fluid collection present small amount of free air as expected postsurgery      COURSE & MEDICAL DECISION MAKING    ED Observation Status? Yes; I am placing the patient in to an observation status due to a diagnostic uncertainty as well as therapeutic intensity. Patient placed in observation status at 4:33 AM, 11/27/2023.     Observation plan is as follows: Obtain diagnostic testing, monitoring, serial exams    Upon Reevaluation, the patient's condition has: not improved; and will be escalated to hospitalization.    Patient discharged from ED Observation status at 7:45 AM 11/27/2023    INITIAL ASSESSMENT, COURSE AND PLAN  Care Narrative: Patient presents with postoperative abdominal pain.  Vital signs are normal.  She is uncomfortable and has quite a bit of tenderness mostly on the right side of the abdomen.  Her wounds appear to be healing well.  Ordered laboratory data.  Given the degree of tenderness will obtain CT scan.  I suspect most likely the pain is postoperative and will not need emergency surgery we will therefore give Toradol IV for pain.  Patient reported that she felt lightheaded when she stood up.  Suspect this is orthostatic.  She has no cardiopulmonary symptoms.  EKG is without acute ischemia.    Patient was nauseous she was given Zofran.  Ordered IV fluids.    Laboratory data with elevated WBC 17,000 worse from prior.  LFTs are still elevated.  Bilirubin is normal.    Urinalysis returned negative.    CT scan with expected postoperative findings.    Patient had continued discomfort.  No  peritonitis on repeat exam.  I ordered fentanyl.    Paged general surgery and discussed the case with Dr. Bowers.  He requested HIDA scan and given the patient's ongoing discomfort she will admit the patient for further treatment.        ADDITIONAL PROBLEM LIST  Past Medical History:   Diagnosis Date    Cholecystitis        DISPOSITION AND DISCUSSIONS  I have discussed management of the patient with the following physicians and ROSSY's: Dr. Bowers      FINAL IMPRESSION  1.  Postoperative abdominal pain  2.  Leukocytosis  3.  Elevated liver function tests    This dictation was created using voice recognition software. The accuracy of the dictation is limited to the abilities of the software. I expect there may be some errors of grammar and possibly content. The nursing notes were reviewed and certain aspects of this information were incorporated into this note.    Electronically signed by: Sherwin Childs M.D., 11/27/2023

## 2023-11-27 NOTE — ASSESSMENT & PLAN NOTE
SP lap darrion on 11/25  Presents with abd pain  LFTs mildly elevated  CT shows fluid in GB fossa  HIDA with bile leak.  11/28 ERCP sphincterotomy balloon sweep and placement of 10 Urdu by 7 cm plastic biliary stent.

## 2023-11-27 NOTE — ED NOTES
Bedside report received from off going RN/tech: Liza Chew, assumed care of patient.  POC discussed with patient. Call light within reach, all needs addressed at this time.       Fall risk interventions in place: Not Applicable (all applicable per [place] Fall risk assessment)   Continuous monitoring: Pulse Ox or Blood Pressure  IVF/IV medications: Infusion per MAR (List Med(s)) NS  Oxygen: Room Air  Bedside sitter: Not Applicable   Isolation: Not Applicable

## 2023-11-27 NOTE — ED TRIAGE NOTES
"Chief Complaint   Patient presents with    Dizziness     Approx 2 hours ago, pt reports standing up and feeling dizzy, lightheaded w/ SOB. Pt also reports feeling diaphoretic    Abdominal Pain     Pt here for ongoing abd pain, recent visits for same, darrion surgery approx 2 days ago     /64   Pulse 75   Temp 36.2 °C (97.2 °F) (Temporal)   Resp 18   Ht 1.575 m (5' 2\")   Wt 66.5 kg (146 lb 9.7 oz)   LMP 10/13/2023 (Approximate)   SpO2 99%   BMI 26.81 kg/m²     Pt brought in by  for above cc  Pt had darrion surgery here approx x2 days ago, states she has not had any complication until this AM approx 2 hours ago  Pt endorses having dizziness, lightheaded, SOB and feeling sweaty at the time, along w/ R sided abd pain- pt took a dose of her prescribed oxycodone which she reports has not helped w/ pain    EKG completed in triage d/t SOB complaint, denies currently having SOB in triage     Pt to lobby by WC w/ , educated on rooming process   "

## 2023-11-27 NOTE — PROGRESS NOTES
4 Eyes Skin Assessment Completed by ZAC Ramirez and Jostin RN.    Head WDL  Ears WDL  Nose WDL  Mouth WDL  Neck WDL  Breast/Chest WDL  Shoulder Blades WDL  Spine WDL  (R) Arm/Elbow/Hand WDL  (L) Arm/Elbow/Hand WDL  Abdomen Incision - x 4 lap sites closed with dermabond; abdomen slightly distended  Groin WDL  Scrotum/Coccyx/Buttocks WDL  (R) Leg WDL  (L) Leg WDL  (R) Heel/Foot/Toe WDL  (L) Heel/Foot/Toe WDL          Devices In Places Pulse Ox      Interventions In Place Pillows and Pressure Redistribution Mattress    Possible Skin Injury No    Pictures Uploaded Into Epic N/A  Wound Consult Placed N/A  RN Wound Prevention Protocol Ordered No

## 2023-11-27 NOTE — CONSULTS
DATE OF CONSULTATION:  11/27/2023     REFERRING PHYSICIAN:   Sherwin Childs M.D.     CONSULTING PHYSICIAN:  Shante Bowers M.D.     REASON FOR CONSULTATION:  I have been asked by  to see the patient in surgical consultation for evaluation of abdominal pain.    HISTORY OF PRESENT ILLNESS: The patient is a   38 year-old  woman who presents to the Emergency Department with a 2-day history of moderate right subcostal abdominal pain. She underwent lap darrion on 11/25 for cholelithiasis. LFTs mildly elevated. CT shows fluid in GB fossa. HIDA now shows a bile leak.    PAST MEDICAL HISTORY:  has a past medical history of Cholecystitis.    She has no past medical history of Breast cancer (HCC).    PAST SURGICAL HISTORY:  has a past surgical history that includes darrion by laparoscopy (11/25/2023).    ALLERGIES: No Known Allergies    CURRENT MEDICATIONS:    Home Medications       Reviewed by Leeann Jeff (Pharmacy Tech) on 11/27/23 at 0816  Med List Status: Unable to Obtain     Medication Last Dose Status   acetaminophen (TYLENOL) 500 MG Tab  Active   cimetidine (TAGAMET) 300 MG Tab  Active   IBSRELA 50 MG Tab  Active   ibuprofen (MOTRIN) 800 MG Tab  Active   omeprazole (PRILOSEC) 20 MG delayed-release capsule  Active   oxyCODONE-acetaminophen (PERCOCET) 5-325 MG Tab  Active   therapeutic multivitamin-minerals (THERAGRAN-M) Tab  Active                    FAMILY HISTORY: family history includes No Known Problems in her father and mother.    SOCIAL HISTORY:  reports that she has never smoked. She has never used smokeless tobacco. She reports that she does not drink alcohol and does not use drugs.    REVIEW OF SYSTEMS: Comprehensive review of systems is negative with the exception of the aforementioned HPI, PMH, and PSH bullets in accordance with CMS guidelines.    PHYSICAL EXAMINATION:    Physical Exam  Cardiovascular:      Rate and Rhythm: Normal rate.   Pulmonary:      Effort: Pulmonary effort is  normal.   Abdominal:      General: There is no distension.      Palpations: Abdomen is soft.      Tenderness: There is abdominal tenderness.      Comments: Incisions healing   Musculoskeletal:         General: Normal range of motion.      Cervical back: Neck supple.   Skin:     General: Skin is warm.   Neurological:      General: No focal deficit present.      Mental Status: She is alert.         LABORATORY VALUES:   Recent Labs     11/25/23  0115 11/26/23  0221 11/27/23  0455   WBC 8.7 10.8 17.0*   RBC 4.07* 3.63* 4.53   HEMOGLOBIN 13.3 11.7* 14.6   HEMATOCRIT 37.2 33.4* 42.0   MCV 91.4 92.0 92.7   MCH 32.7 32.2 32.2   MCHC 35.8* 35.0 34.8   RDW 39.2 40.7 42.6   PLATELETCT 236 206 223   MPV 9.6 10.0 10.1     Recent Labs     11/25/23  0115 11/27/23  0455   SODIUM 138 137   POTASSIUM 4.2 3.9   CHLORIDE 106 103   CO2 20 19*   GLUCOSE 108* 102*   BUN 11 11   CREATININE 0.69 0.67   CALCIUM 9.0 9.1     Recent Labs     11/25/23  0115 11/27/23  0455   ASTSGOT 603* 226*   ALTSGPT 470* 767*   TBILIRUBIN 1.0 1.2   ALKPHOSPHAT 106* 160*   GLOBULIN 2.8 3.2            IMAGING:   NM-HEPATOBILIARY SCAN   Final Result      Small focal accumulation of radiotracer in the gallbladder fossa suggesting bile leak.      No findings of biliary obstruction.      CT-ABDOMEN-PELVIS WITH   Final Result         1.  Intra-abdominal free air, nonspecific and within expected limits for recent postop changes. Radiographic appearance cannot exclude component of viscus perforation.   2.  Small free fluid collection the gallbladder fossa, could represent postop hematoma, seroma, biloma, or early forming abscess   3.  Small quantity of perihepatic and pelvic ascites   4.  Fibroid uterine changes          ASSESSMENT AND PLAN:     * Abdominal pain- (present on admission)  Assessment & Plan  SP vikas maier on 11/25  Presents with abd pain  LFTs mildly elevated  CT shows fluid in GB fossa  HIDA with bile leak.  GI consulted.        DISPOSITION: Admit [facility].     ____________________________________     Shante Bowers M.D.    DD: 11/27/2023  10:06 AM    AAST Grading System for EGS Conditions  ACS NSQIP Surgical Risk Calculator

## 2023-11-27 NOTE — PROGRESS NOTES
Patient has arrived on unit. Report given to Saumya in Nuclear medicine. Patient is pending HIDA scan.

## 2023-11-27 NOTE — DISCHARGE PLANNING
Case Management Discharge Planning    Admission Date: 11/27/2023  GMLOS:    ALOS: 0    6-Clicks ADL Score: 22  6-Clicks Mobility Score: 18      Anticipated Discharge Dispo: Discharge Disposition: Discharged to home/self care (01)    DME Needed: No    Action(s) Taken: DC Assessment Complete (See below)    Escalations Completed: None    Medically Clear: No    Next Steps: CM to follow up with IDT regarding discharge planning needs/barriers    Barriers to Discharge: Medical clearance    Is the patient up for discharge tomorrow: No      Care Transition Team Assessment    Assessment completed through use of an ; patient is Upper sorbian speaking only.  name and number: [place] #[id number]    Case management role discussed; patient verbalized understanding of case management role  Demographics verified  Patient is a 38 year old female admitted to [facility] for complaints of abdominal pain/discomfort after undergoing a cholecystectomy on 11/26. Patient reports that the pain before her surgery has not resolved.  Patient lives in a mobile home with her spouse and daughter; 1 step to enter the residence  Patient's preferred pharmacy is [facility] in [place], [place]  Prior to admission, patient was independent with IADLS/ADLS; patient reports no DME use at baseline  Patient and patient spouse report no monthly income  Patient requesting assistance with obtaining insurance; RNCM requested patient be screened for Medicaid through PFA department  Discharge planning discussed with patient; patient verbalized understanding that discharge plans are based on patient's hospital course and through recommendations of IDT staff    Information Source  Orientation Level: Oriented X4  Information Given By: Patient  Informant's Name: Poly  Who is responsible for making decisions for patient? : Patient    Readmission Evaluation  Is this a readmission?: Yes - unplanned readmission  Why do you think you were readmitted?: Ongoing pain  Was an  appointment arranged for you prior to discharge?: Yes, did not attend appointment  Why didn't you go to your appointment?: Other (comment)  Were there new prescriptions you were supposed to fill after you were discharged?: No  Did you understand your discharge instructions?: Yes  Did you have enough support after your last discharge?: Yes    Elopement Risk  Legal Hold: No  Ambulatory or Self Mobile in Wheelchair: No-Not an Elopement Risk  Disoriented: No  Psychiatric Symptoms: None  History of Wandering: No  Elopement this Admit: No  Vocalizing Wanting to Leave: No  Displays Behaviors, Body Language Wanting to Leave: No-Not at Risk for Elopement  Elopement Risk: Not at Risk for Elopement    Interdisciplinary Discharge Planning  Does Admitting Nurse Feel This Could be a Complex Discharge?: No  Primary Care Physician: Puneet Fung  Lives with - Patient's Self Care Capacity: Spouse, Child Less than 18 Years of Age  Patient or legal guardian wants to designate a caregiver: No  Support Systems: Family Member(s), Spouse / Significant Other  Housing / Facility: Mobile Home  Name of Care Facility: n/a  Do You Take your Prescribed Medications Regularly: Yes  Able to Return to Previous ADL's: Future Time w/Therapy  Mobility Issues: No  Prior Services: None, Home-Independent  Patient Prefers to be Discharged to:: Home  Assistance Needed: No  Durable Medical Equipment: Not Applicable  DME Provider / Phone: n/a    Discharge Preparedness  What is your plan after discharge?: Home with help  What are your discharge supports?: Spouse, Child  Prior Functional Level: Ambulatory, Drives Self, Independent with Activities of Daily Living, Independent with Medication Management  Difficulity with ADLs: None  Difficulity with IADLs: None    Functional Assesment  Prior Functional Level: Ambulatory, Drives Self, Independent with Activities of Daily Living, Independent with Medication Management    Finances  Financial Barriers to Discharge:  Yes  Average Monthly Income:  (Patient and patient spouse refused to provide income information)  Source of Income: Employed  Prescription Coverage: No    Vision / Hearing Impairment  Vision Impairment : Yes  Right Eye Vision: Impaired, Wears Glasses  Left Eye Vision: Impaired, Wears Glasses  Hearing Impairment : No    Values / Beliefs / Concerns  Values / Beliefs Concerns : No    Advance Directive  Advance Directive?: None  Advance Directive offered?: AD Booklet refused    Domestic Abuse  Have you ever been the victim of abuse or violence?: No  Physical Abuse or Sexual Abuse: No  Verbal Abuse or Emotional Abuse: No  Possible Abuse/Neglect Reported to:: Not Applicable    Psychological Assessment  History of Substance Abuse: None  History of Psychiatric Problems: No  Non-compliant with Treatment: No  Newly Diagnosed Illness: No    Discharge Risks or Barriers  Discharge risks or barriers?: No  Patient risk factors: Cognitive / sensory / physical deficit, Uninsured or underinsured, Readmission    Anticipated Discharge Information  Discharge Disposition: Discharged to home/self care (01)

## 2023-11-28 ENCOUNTER — APPOINTMENT (OUTPATIENT)
Dept: RADIOLOGY | Facility: MEDICAL CENTER | Age: 38
DRG: 394 | End: 2023-11-28
Attending: INTERNAL MEDICINE
Payer: COMMERCIAL

## 2023-11-28 ENCOUNTER — ANESTHESIA EVENT (OUTPATIENT)
Dept: SURGERY | Facility: MEDICAL CENTER | Age: 38
DRG: 394 | End: 2023-11-28
Payer: COMMERCIAL

## 2023-11-28 ENCOUNTER — ANESTHESIA (OUTPATIENT)
Dept: SURGERY | Facility: MEDICAL CENTER | Age: 38
DRG: 394 | End: 2023-11-28
Payer: COMMERCIAL

## 2023-11-28 LAB
ALBUMIN SERPL BCP-MCNC: 3.3 G/DL (ref 3.2–4.9)
ALBUMIN/GLOB SERPL: 1.3 G/DL
ALP SERPL-CCNC: 124 U/L (ref 30–99)
ALT SERPL-CCNC: 405 U/L (ref 2–50)
ANION GAP SERPL CALC-SCNC: 9 MMOL/L (ref 7–16)
AST SERPL-CCNC: 63 U/L (ref 12–45)
BASOPHILS # BLD AUTO: 0.3 % (ref 0–1.8)
BASOPHILS # BLD: 0.03 K/UL (ref 0–0.12)
BILIRUB SERPL-MCNC: 1 MG/DL (ref 0.1–1.5)
BUN SERPL-MCNC: 6 MG/DL (ref 8–22)
CALCIUM ALBUM COR SERPL-MCNC: 8.7 MG/DL (ref 8.5–10.5)
CALCIUM SERPL-MCNC: 8.1 MG/DL (ref 8.5–10.5)
CHLORIDE SERPL-SCNC: 106 MMOL/L (ref 96–112)
CO2 SERPL-SCNC: 23 MMOL/L (ref 20–33)
CREAT SERPL-MCNC: 0.61 MG/DL (ref 0.5–1.4)
EOSINOPHIL # BLD AUTO: 0.05 K/UL (ref 0–0.51)
EOSINOPHIL NFR BLD: 0.4 % (ref 0–6.9)
ERYTHROCYTE [DISTWIDTH] IN BLOOD BY AUTOMATED COUNT: 41.9 FL (ref 35.9–50)
GFR SERPLBLD CREATININE-BSD FMLA CKD-EPI: 117 ML/MIN/1.73 M 2
GLOBULIN SER CALC-MCNC: 2.5 G/DL (ref 1.9–3.5)
GLUCOSE BLD STRIP.AUTO-MCNC: 123 MG/DL (ref 65–99)
GLUCOSE SERPL-MCNC: 102 MG/DL (ref 65–99)
HCT VFR BLD AUTO: 34.3 % (ref 37–47)
HGB BLD-MCNC: 12.3 G/DL (ref 12–16)
IMM GRANULOCYTES # BLD AUTO: 0.04 K/UL (ref 0–0.11)
IMM GRANULOCYTES NFR BLD AUTO: 0.4 % (ref 0–0.9)
LYMPHOCYTES # BLD AUTO: 1.97 K/UL (ref 1–4.8)
LYMPHOCYTES NFR BLD: 17.6 % (ref 22–41)
MCH RBC QN AUTO: 33.2 PG (ref 27–33)
MCHC RBC AUTO-ENTMCNC: 35.9 G/DL (ref 32.2–35.5)
MCV RBC AUTO: 92.7 FL (ref 81.4–97.8)
MONOCYTES # BLD AUTO: 1.01 K/UL (ref 0–0.85)
MONOCYTES NFR BLD AUTO: 9 % (ref 0–13.4)
NEUTROPHILS # BLD AUTO: 8.09 K/UL (ref 1.82–7.42)
NEUTROPHILS NFR BLD: 72.3 % (ref 44–72)
NRBC # BLD AUTO: 0 K/UL
NRBC BLD-RTO: 0 /100 WBC (ref 0–0.2)
PLATELET # BLD AUTO: 210 K/UL (ref 164–446)
PMV BLD AUTO: 9.9 FL (ref 9–12.9)
POTASSIUM SERPL-SCNC: 3.9 MMOL/L (ref 3.6–5.5)
PROT SERPL-MCNC: 5.8 G/DL (ref 6–8.2)
RBC # BLD AUTO: 3.7 M/UL (ref 4.2–5.4)
SODIUM SERPL-SCNC: 138 MMOL/L (ref 135–145)
WBC # BLD AUTO: 11.2 K/UL (ref 4.8–10.8)

## 2023-11-28 PROCEDURE — 82962 GLUCOSE BLOOD TEST: CPT

## 2023-11-28 PROCEDURE — 770001 HCHG ROOM/CARE - MED/SURG/GYN PRIV*

## 2023-11-28 PROCEDURE — A9270 NON-COVERED ITEM OR SERVICE: HCPCS | Performed by: SURGERY

## 2023-11-28 PROCEDURE — 36415 COLL VENOUS BLD VENIPUNCTURE: CPT

## 2023-11-28 PROCEDURE — C1889 IMPLANT/INSERT DEVICE, NOC: HCPCS | Performed by: INTERNAL MEDICINE

## 2023-11-28 PROCEDURE — 700102 HCHG RX REV CODE 250 W/ 637 OVERRIDE(OP): Performed by: PHYSICIAN ASSISTANT

## 2023-11-28 PROCEDURE — 160203 HCHG ENDO MINUTES - 1ST 30 MINS LEVEL 4: Performed by: INTERNAL MEDICINE

## 2023-11-28 PROCEDURE — 160009 HCHG ANES TIME/MIN: Performed by: INTERNAL MEDICINE

## 2023-11-28 PROCEDURE — 700117 HCHG RX CONTRAST REV CODE 255: Performed by: INTERNAL MEDICINE

## 2023-11-28 PROCEDURE — C1769 GUIDE WIRE: HCPCS | Performed by: INTERNAL MEDICINE

## 2023-11-28 PROCEDURE — 0F798DZ DILATION OF COMMON BILE DUCT WITH INTRALUMINAL DEVICE, VIA NATURAL OR ARTIFICIAL OPENING ENDOSCOPIC: ICD-10-PCS | Performed by: INTERNAL MEDICINE

## 2023-11-28 PROCEDURE — C2617 STENT, NON-COR, TEM W/O DEL: HCPCS | Performed by: INTERNAL MEDICINE

## 2023-11-28 PROCEDURE — 700102 HCHG RX REV CODE 250 W/ 637 OVERRIDE(OP): Performed by: SURGERY

## 2023-11-28 PROCEDURE — 160208 HCHG ENDO MINUTES - EA ADDL 1 MIN LEVEL 4: Performed by: INTERNAL MEDICINE

## 2023-11-28 PROCEDURE — 85025 COMPLETE CBC W/AUTO DIFF WBC: CPT

## 2023-11-28 PROCEDURE — 160035 HCHG PACU - 1ST 60 MINS PHASE I: Performed by: INTERNAL MEDICINE

## 2023-11-28 PROCEDURE — 99232 SBSQ HOSP IP/OBS MODERATE 35: CPT | Performed by: PHYSICIAN ASSISTANT

## 2023-11-28 PROCEDURE — A9270 NON-COVERED ITEM OR SERVICE: HCPCS | Performed by: PHYSICIAN ASSISTANT

## 2023-11-28 PROCEDURE — 80053 COMPREHEN METABOLIC PANEL: CPT

## 2023-11-28 PROCEDURE — A9270 NON-COVERED ITEM OR SERVICE: HCPCS

## 2023-11-28 PROCEDURE — 160048 HCHG OR STATISTICAL LEVEL 1-5: Performed by: INTERNAL MEDICINE

## 2023-11-28 PROCEDURE — 43274 ERCP DUCT STENT PLACEMENT: CPT | Performed by: INTERNAL MEDICINE

## 2023-11-28 PROCEDURE — 700102 HCHG RX REV CODE 250 W/ 637 OVERRIDE(OP)

## 2023-11-28 PROCEDURE — 700105 HCHG RX REV CODE 258: Performed by: INTERNAL MEDICINE

## 2023-11-28 PROCEDURE — 700111 HCHG RX REV CODE 636 W/ 250 OVERRIDE (IP): Performed by: SURGERY

## 2023-11-28 PROCEDURE — 700111 HCHG RX REV CODE 636 W/ 250 OVERRIDE (IP): Mod: JZ | Performed by: ANESTHESIOLOGY

## 2023-11-28 PROCEDURE — 160002 HCHG RECOVERY MINUTES (STAT): Performed by: INTERNAL MEDICINE

## 2023-11-28 PROCEDURE — 700105 HCHG RX REV CODE 258: Performed by: SURGERY

## 2023-11-28 DEVICE — STENT BILIARY ADVANTIX 10FR X 7CM: Type: IMPLANTABLE DEVICE | Site: BILE DUCT | Status: FUNCTIONAL

## 2023-11-28 RX ORDER — OXYCODONE HCL 5 MG/5 ML
10 SOLUTION, ORAL ORAL
Status: DISCONTINUED | OUTPATIENT
Start: 2023-11-28 | End: 2023-11-28 | Stop reason: HOSPADM

## 2023-11-28 RX ORDER — DIPHENHYDRAMINE HYDROCHLORIDE 50 MG/ML
12.5 INJECTION INTRAMUSCULAR; INTRAVENOUS
Status: DISCONTINUED | OUTPATIENT
Start: 2023-11-28 | End: 2023-11-28 | Stop reason: HOSPADM

## 2023-11-28 RX ORDER — ONDANSETRON 2 MG/ML
INJECTION INTRAMUSCULAR; INTRAVENOUS PRN
Status: DISCONTINUED | OUTPATIENT
Start: 2023-11-28 | End: 2023-11-28 | Stop reason: SURG

## 2023-11-28 RX ORDER — HYDROMORPHONE HYDROCHLORIDE 1 MG/ML
0.2 INJECTION, SOLUTION INTRAMUSCULAR; INTRAVENOUS; SUBCUTANEOUS
Status: DISCONTINUED | OUTPATIENT
Start: 2023-11-28 | End: 2023-11-28 | Stop reason: HOSPADM

## 2023-11-28 RX ORDER — ACETAMINOPHEN 500 MG
1000 TABLET ORAL EVERY 6 HOURS
Status: DISCONTINUED | OUTPATIENT
Start: 2023-11-28 | End: 2023-11-30 | Stop reason: HOSPADM

## 2023-11-28 RX ORDER — ENOXAPARIN SODIUM 100 MG/ML
40 INJECTION SUBCUTANEOUS DAILY
Status: DISCONTINUED | OUTPATIENT
Start: 2023-11-28 | End: 2023-11-30 | Stop reason: HOSPADM

## 2023-11-28 RX ORDER — LABETALOL HYDROCHLORIDE 5 MG/ML
5 INJECTION, SOLUTION INTRAVENOUS
Status: DISCONTINUED | OUTPATIENT
Start: 2023-11-28 | End: 2023-11-28 | Stop reason: HOSPADM

## 2023-11-28 RX ORDER — OXYCODONE HCL 5 MG/5 ML
5 SOLUTION, ORAL ORAL
Status: DISCONTINUED | OUTPATIENT
Start: 2023-11-28 | End: 2023-11-28 | Stop reason: HOSPADM

## 2023-11-28 RX ORDER — MIDAZOLAM HYDROCHLORIDE 1 MG/ML
INJECTION INTRAMUSCULAR; INTRAVENOUS PRN
Status: DISCONTINUED | OUTPATIENT
Start: 2023-11-28 | End: 2023-11-28 | Stop reason: SURG

## 2023-11-28 RX ORDER — SODIUM CHLORIDE, SODIUM LACTATE, POTASSIUM CHLORIDE, CALCIUM CHLORIDE 600; 310; 30; 20 MG/100ML; MG/100ML; MG/100ML; MG/100ML
INJECTION, SOLUTION INTRAVENOUS CONTINUOUS
Status: DISCONTINUED | OUTPATIENT
Start: 2023-11-28 | End: 2023-11-28 | Stop reason: HOSPADM

## 2023-11-28 RX ORDER — HALOPERIDOL 5 MG/ML
1 INJECTION INTRAMUSCULAR
Status: DISCONTINUED | OUTPATIENT
Start: 2023-11-28 | End: 2023-11-28 | Stop reason: HOSPADM

## 2023-11-28 RX ORDER — EPHEDRINE SULFATE 50 MG/ML
5 INJECTION, SOLUTION INTRAVENOUS
Status: DISCONTINUED | OUTPATIENT
Start: 2023-11-28 | End: 2023-11-28 | Stop reason: HOSPADM

## 2023-11-28 RX ORDER — DIPHENHYDRAMINE HYDROCHLORIDE 50 MG/ML
INJECTION INTRAMUSCULAR; INTRAVENOUS PRN
Status: DISCONTINUED | OUTPATIENT
Start: 2023-11-28 | End: 2023-11-28 | Stop reason: SURG

## 2023-11-28 RX ORDER — MEPERIDINE HYDROCHLORIDE 25 MG/ML
12.5 INJECTION INTRAMUSCULAR; INTRAVENOUS; SUBCUTANEOUS
Status: DISCONTINUED | OUTPATIENT
Start: 2023-11-28 | End: 2023-11-28 | Stop reason: HOSPADM

## 2023-11-28 RX ORDER — METOPROLOL TARTRATE 1 MG/ML
1 INJECTION, SOLUTION INTRAVENOUS
Status: DISCONTINUED | OUTPATIENT
Start: 2023-11-28 | End: 2023-11-28 | Stop reason: HOSPADM

## 2023-11-28 RX ORDER — INDOMETHACIN 100 MG
SUPPOSITORY, RECTAL RECTAL
Status: COMPLETED
Start: 2023-11-28 | End: 2023-11-28

## 2023-11-28 RX ORDER — MIDAZOLAM HYDROCHLORIDE 1 MG/ML
1 INJECTION INTRAMUSCULAR; INTRAVENOUS
Status: DISCONTINUED | OUTPATIENT
Start: 2023-11-28 | End: 2023-11-28 | Stop reason: HOSPADM

## 2023-11-28 RX ORDER — OXYCODONE HYDROCHLORIDE 5 MG/1
5 TABLET ORAL EVERY 4 HOURS PRN
Status: DISCONTINUED | OUTPATIENT
Start: 2023-11-28 | End: 2023-11-30 | Stop reason: HOSPADM

## 2023-11-28 RX ORDER — SODIUM CHLORIDE, SODIUM LACTATE, POTASSIUM CHLORIDE, CALCIUM CHLORIDE 600; 310; 30; 20 MG/100ML; MG/100ML; MG/100ML; MG/100ML
INJECTION, SOLUTION INTRAVENOUS ONCE
Status: COMPLETED | OUTPATIENT
Start: 2023-11-28 | End: 2023-11-29

## 2023-11-28 RX ORDER — ONDANSETRON 2 MG/ML
4 INJECTION INTRAMUSCULAR; INTRAVENOUS
Status: DISCONTINUED | OUTPATIENT
Start: 2023-11-28 | End: 2023-11-28 | Stop reason: HOSPADM

## 2023-11-28 RX ORDER — SUCCINYLCHOLINE CHLORIDE 20 MG/ML
INJECTION INTRAMUSCULAR; INTRAVENOUS PRN
Status: DISCONTINUED | OUTPATIENT
Start: 2023-11-28 | End: 2023-11-28 | Stop reason: SURG

## 2023-11-28 RX ORDER — HYDROMORPHONE HYDROCHLORIDE 1 MG/ML
0.1 INJECTION, SOLUTION INTRAMUSCULAR; INTRAVENOUS; SUBCUTANEOUS
Status: DISCONTINUED | OUTPATIENT
Start: 2023-11-28 | End: 2023-11-28 | Stop reason: HOSPADM

## 2023-11-28 RX ORDER — HYDROMORPHONE HYDROCHLORIDE 1 MG/ML
0.4 INJECTION, SOLUTION INTRAMUSCULAR; INTRAVENOUS; SUBCUTANEOUS
Status: DISCONTINUED | OUTPATIENT
Start: 2023-11-28 | End: 2023-11-28 | Stop reason: HOSPADM

## 2023-11-28 RX ADMIN — MIDAZOLAM HYDROCHLORIDE 2 MG: 1 INJECTION, SOLUTION INTRAMUSCULAR; INTRAVENOUS at 16:05

## 2023-11-28 RX ADMIN — ACETAMINOPHEN 1000 MG: 500 TABLET, FILM COATED ORAL at 11:24

## 2023-11-28 RX ADMIN — PIPERACILLIN AND TAZOBACTAM 3.38 G: 3; .375 INJECTION, POWDER, FOR SOLUTION INTRAVENOUS at 21:44

## 2023-11-28 RX ADMIN — MORPHINE SULFATE 4 MG: 4 INJECTION, SOLUTION INTRAMUSCULAR; INTRAVENOUS at 06:12

## 2023-11-28 RX ADMIN — ONDANSETRON 4 MG: 2 INJECTION INTRAMUSCULAR; INTRAVENOUS at 16:23

## 2023-11-28 RX ADMIN — MORPHINE SULFATE 4 MG: 4 INJECTION, SOLUTION INTRAMUSCULAR; INTRAVENOUS at 01:35

## 2023-11-28 RX ADMIN — DOCUSATE SODIUM 100 MG: 100 CAPSULE, LIQUID FILLED ORAL at 06:06

## 2023-11-28 RX ADMIN — SUCCINYLCHOLINE CHLORIDE 80 MG: 20 INJECTION, SOLUTION INTRAMUSCULAR; INTRAVENOUS at 16:09

## 2023-11-28 RX ADMIN — MORPHINE SULFATE 4 MG: 4 INJECTION, SOLUTION INTRAMUSCULAR; INTRAVENOUS at 11:26

## 2023-11-28 RX ADMIN — FENTANYL CITRATE 100 MCG: 50 INJECTION, SOLUTION INTRAMUSCULAR; INTRAVENOUS at 16:09

## 2023-11-28 RX ADMIN — MORPHINE SULFATE 2 MG: 4 INJECTION, SOLUTION INTRAMUSCULAR; INTRAVENOUS at 09:22

## 2023-11-28 RX ADMIN — PROPOFOL 120 MG: 10 INJECTION, EMULSION INTRAVENOUS at 16:09

## 2023-11-28 RX ADMIN — PIPERACILLIN AND TAZOBACTAM 3.38 G: 3; .375 INJECTION, POWDER, FOR SOLUTION INTRAVENOUS at 06:06

## 2023-11-28 RX ADMIN — SODIUM CHLORIDE, POTASSIUM CHLORIDE, SODIUM LACTATE AND CALCIUM CHLORIDE 1000 ML: 600; 310; 30; 20 INJECTION, SOLUTION INTRAVENOUS at 17:35

## 2023-11-28 RX ADMIN — Medication 100 MG: at 16:30

## 2023-11-28 RX ADMIN — ONDANSETRON 4 MG: 4 TABLET, ORALLY DISINTEGRATING ORAL at 22:24

## 2023-11-28 RX ADMIN — SODIUM CHLORIDE, POTASSIUM CHLORIDE, SODIUM LACTATE AND CALCIUM CHLORIDE: 600; 310; 30; 20 INJECTION, SOLUTION INTRAVENOUS at 01:30

## 2023-11-28 RX ADMIN — ONDANSETRON 4 MG: 2 INJECTION INTRAMUSCULAR; INTRAVENOUS at 09:22

## 2023-11-28 RX ADMIN — PIPERACILLIN AND TAZOBACTAM 3.38 G: 3; .375 INJECTION, POWDER, FOR SOLUTION INTRAVENOUS at 12:12

## 2023-11-28 RX ADMIN — MORPHINE SULFATE 4 MG: 4 INJECTION, SOLUTION INTRAMUSCULAR; INTRAVENOUS at 03:34

## 2023-11-28 RX ADMIN — SODIUM CHLORIDE, POTASSIUM CHLORIDE, SODIUM LACTATE AND CALCIUM CHLORIDE: 600; 310; 30; 20 INJECTION, SOLUTION INTRAVENOUS at 19:56

## 2023-11-28 RX ADMIN — DIPHENHYDRAMINE HYDROCHLORIDE 12.5 MG: 50 INJECTION, SOLUTION INTRAMUSCULAR; INTRAVENOUS at 16:23

## 2023-11-28 RX ADMIN — DOCUSATE SODIUM 50 MG AND SENNOSIDES 8.6 MG 1 TABLET: 8.6; 5 TABLET, FILM COATED ORAL at 21:45

## 2023-11-28 ASSESSMENT — PAIN DESCRIPTION - PAIN TYPE
TYPE: ACUTE PAIN
TYPE: SURGICAL PAIN
TYPE: ACUTE PAIN
TYPE: SURGICAL PAIN
TYPE: ACUTE PAIN
TYPE: SURGICAL PAIN

## 2023-11-28 ASSESSMENT — ENCOUNTER SYMPTOMS
CHILLS: 0
SHORTNESS OF BREATH: 0
NAUSEA: 0
COUGH: 0
VOMITING: 0
ABDOMINAL PAIN: 1
FEVER: 0

## 2023-11-28 ASSESSMENT — PAIN SCALES - GENERAL: PAIN_LEVEL: 0

## 2023-11-28 NOTE — CARE PLAN
The patient is Stable - Low risk of patient condition declining or worsening    Shift Goals  Clinical Goals: Remain free of signs/sx of increasing infection, prepare for ERCP  Patient Goals: pain control  Family Goals: LOU    Progress made toward(s) clinical / shift goals:  Pt is made NPO at [facility] for ERCP 11/28. ABX is administered per MAR to treat infection, no increase in WBCs.     Patient is not progressing towards the following goals:

## 2023-11-28 NOTE — PROGRESS NOTES
Pt made NPO pending EGD. Alternating between timed abx and continuous IVFs. Pt has only one PIV and does not want RN to try for another one. RN attempted IV, got blood return, catheter bent upon advancement.

## 2023-11-28 NOTE — ANESTHESIA PREPROCEDURE EVALUATION
Case: [id number] Date/Time: 11/28/23 1557    Procedures:       ERCP (ENDOSCOPIC RETROGRADE CHOLANGIOPANCREATOGRAPHY) (Esophagus)      SPHINCTEROTOMY (Esophagus)    Anesthesia type: General    Pre-op diagnosis: Abdominal Pain, Bile Leak    Location: [facility] ROOM 26 / SURGERY SAME DAY [facility]    Surgeons: Papi Davis M.D.            Relevant Problems   No relevant active problems       Physical Exam    Airway   Mallampati: II  TM distance: >3 FB  Neck ROM: full       Cardiovascular - normal exam  Rhythm: regular  Rate: normal  (-) murmur     Dental - normal exam           Pulmonary - normal exam  Breath sounds clear to auscultation     Abdominal    Neurological - normal exam                   Anesthesia Plan    ASA 2       Plan - general       Airway plan will be ETT                    Informed Consent:

## 2023-11-28 NOTE — PROGRESS NOTES
"    DATE: 11/28/2023    Hospital Day 2  admitted with intra-abdominal fluid collection status post laparoscopic cholecystectomy .    INTERVAL EVENTS:  Continued abdominal pain.  WBC trending down, 11.2.  GI note reviewed.    REVIEW OF SYSTEMS:  Review of Systems   Constitutional:  Negative for chills and fever.   Respiratory:  Negative for cough and shortness of breath.    Gastrointestinal:  Positive for abdominal pain. Negative for nausea and vomiting.   Genitourinary:         Voiding       PHYSICAL EXAMINATION:  Vital Signs: /66   Pulse 98   Temp 36.5 °C (97.7 °F) (Temporal)   Resp 16   Ht 1.575 m (5' 2\")   Wt 66.5 kg (146 lb 9.7 oz)   SpO2 97%   Physical Exam  Vitals and nursing note reviewed.   Constitutional:       General: She is awake. She is not in acute distress.     Appearance: Normal appearance. She is not ill-appearing.   Abdominal:      General: There is no distension.      Palpations: Abdomen is soft.      Tenderness: There is generalized abdominal tenderness.      Comments: Laparoscopic incisions CDI with Dermabond   Neurological:      Mental Status: She is alert.   Psychiatric:         Behavior: Behavior is cooperative.         LABORATORY VALUES:   Recent Labs     11/26/23 0221 11/27/23 0455 11/28/23 0449   WBC 10.8 17.0* 11.2*   RBC 3.63* 4.53 3.70*   HEMOGLOBIN 11.7* 14.6 12.3   HEMATOCRIT 33.4* 42.0 34.3*   MCV 92.0 92.7 92.7   MCH 32.2 32.2 33.2*   MCHC 35.0 34.8 35.9*   RDW 40.7 42.6 41.9   PLATELETCT 206 223 210   MPV 10.0 10.1 9.9     Recent Labs     11/27/23 0455 11/28/23 0449   SODIUM 137 138   POTASSIUM 3.9 3.9   CHLORIDE 103 106   CO2 19* 23   GLUCOSE 102* 102*   BUN 11 6*   CREATININE 0.67 0.61   CALCIUM 9.1 8.1*     Recent Labs     11/27/23 0455 11/28/23 0449   ASTSGOT 226* 63*   ALTSGPT 767* 405*   TBILIRUBIN 1.2 1.0   ALKPHOSPHAT 160* 124*   GLOBULIN 3.2 2.5            IMAGING:   NM-HEPATOBILIARY SCAN   Final Result      Small focal accumulation of radiotracer in the " gallbladder fossa suggesting bile leak.      No findings of biliary obstruction.      CT-ABDOMEN-PELVIS WITH   Final Result         1.  Intra-abdominal free air, nonspecific and within expected limits for recent postop changes. Radiographic appearance cannot exclude component of viscus perforation.   2.  Small free fluid collection the gallbladder fossa, could represent postop hematoma, seroma, biloma, or early forming abscess   3.  Small quantity of perihepatic and pelvic ascites   4.  Fibroid uterine changes      DX-PORTABLE FLUOROSCOPY < 1 HOUR Is the patient pregnant? No    (Results Pending)       Radiology images reviewed, Labs reviewed and Medications reviewed  Atkins catheter: No Atkins      DVT Prophylaxis: Enoxaparin (Lovenox)  DVT prophylaxis - mechanical: SCDs  Ulcer prophylaxis: Yes  Antibiotics: Treating active infection/contamination beyond 24 hours perioperative coverage  Assessed for rehab: Patient returned to prior level of function, rehabilitation not indicated at this time      ASSESSMENT AND PLAN:   * Abdominal pain- (present on admission)  Assessment & Plan  SP lap darrion on 11/25  Presents with abd pain  LFTs mildly elevated  CT shows fluid in GB fossa  HIDA with bile leak.  11/28 Tentatively scheduled for ERCP.      - ERCP today  - Continue IV abx      Discussed patient condition with Family, RN, Patient, and trauma surgery. Dr. Bowers

## 2023-11-29 LAB
ALBUMIN SERPL BCP-MCNC: 2.7 G/DL (ref 3.2–4.9)
ALBUMIN/GLOB SERPL: 1 G/DL
ALP SERPL-CCNC: 112 U/L (ref 30–99)
ALT SERPL-CCNC: 260 U/L (ref 2–50)
ANION GAP SERPL CALC-SCNC: 8 MMOL/L (ref 7–16)
AST SERPL-CCNC: 43 U/L (ref 12–45)
BASOPHILS # BLD AUTO: 0.3 % (ref 0–1.8)
BASOPHILS # BLD: 0.02 K/UL (ref 0–0.12)
BILIRUB SERPL-MCNC: 0.6 MG/DL (ref 0.1–1.5)
BUN SERPL-MCNC: 6 MG/DL (ref 8–22)
CALCIUM ALBUM COR SERPL-MCNC: 9.1 MG/DL (ref 8.5–10.5)
CALCIUM SERPL-MCNC: 8.1 MG/DL (ref 8.5–10.5)
CHLORIDE SERPL-SCNC: 108 MMOL/L (ref 96–112)
CO2 SERPL-SCNC: 23 MMOL/L (ref 20–33)
CREAT SERPL-MCNC: 0.49 MG/DL (ref 0.5–1.4)
EOSINOPHIL # BLD AUTO: 0.15 K/UL (ref 0–0.51)
EOSINOPHIL NFR BLD: 2.3 % (ref 0–6.9)
ERYTHROCYTE [DISTWIDTH] IN BLOOD BY AUTOMATED COUNT: 40.2 FL (ref 35.9–50)
GFR SERPLBLD CREATININE-BSD FMLA CKD-EPI: 124 ML/MIN/1.73 M 2
GLOBULIN SER CALC-MCNC: 2.6 G/DL (ref 1.9–3.5)
GLUCOSE SERPL-MCNC: 94 MG/DL (ref 65–99)
HCT VFR BLD AUTO: 28.7 % (ref 37–47)
HGB BLD-MCNC: 10.2 G/DL (ref 12–16)
IMM GRANULOCYTES # BLD AUTO: 0.01 K/UL (ref 0–0.11)
IMM GRANULOCYTES NFR BLD AUTO: 0.2 % (ref 0–0.9)
LIPASE SERPL-CCNC: 77 U/L (ref 11–82)
LYMPHOCYTES # BLD AUTO: 1.71 K/UL (ref 1–4.8)
LYMPHOCYTES NFR BLD: 25.9 % (ref 22–41)
MCH RBC QN AUTO: 32.9 PG (ref 27–33)
MCHC RBC AUTO-ENTMCNC: 35.5 G/DL (ref 32.2–35.5)
MCV RBC AUTO: 92.6 FL (ref 81.4–97.8)
MONOCYTES # BLD AUTO: 0.75 K/UL (ref 0–0.85)
MONOCYTES NFR BLD AUTO: 11.3 % (ref 0–13.4)
NEUTROPHILS # BLD AUTO: 3.97 K/UL (ref 1.82–7.42)
NEUTROPHILS NFR BLD: 60 % (ref 44–72)
NRBC # BLD AUTO: 0 K/UL
NRBC BLD-RTO: 0 /100 WBC (ref 0–0.2)
PLATELET # BLD AUTO: 189 K/UL (ref 164–446)
PMV BLD AUTO: 10.2 FL (ref 9–12.9)
POTASSIUM SERPL-SCNC: 3.6 MMOL/L (ref 3.6–5.5)
PROT SERPL-MCNC: 5.3 G/DL (ref 6–8.2)
RBC # BLD AUTO: 3.1 M/UL (ref 4.2–5.4)
SODIUM SERPL-SCNC: 139 MMOL/L (ref 135–145)
WBC # BLD AUTO: 6.6 K/UL (ref 4.8–10.8)

## 2023-11-29 PROCEDURE — 700111 HCHG RX REV CODE 636 W/ 250 OVERRIDE (IP): Mod: JZ | Performed by: PHYSICIAN ASSISTANT

## 2023-11-29 PROCEDURE — 700102 HCHG RX REV CODE 250 W/ 637 OVERRIDE(OP): Performed by: PHYSICIAN ASSISTANT

## 2023-11-29 PROCEDURE — 700111 HCHG RX REV CODE 636 W/ 250 OVERRIDE (IP): Performed by: SURGERY

## 2023-11-29 PROCEDURE — 700102 HCHG RX REV CODE 250 W/ 637 OVERRIDE(OP): Performed by: SURGERY

## 2023-11-29 PROCEDURE — A9270 NON-COVERED ITEM OR SERVICE: HCPCS | Performed by: SURGERY

## 2023-11-29 PROCEDURE — RXMED WILLOW AMBULATORY MEDICATION CHARGE: Performed by: PHYSICIAN ASSISTANT

## 2023-11-29 PROCEDURE — 85025 COMPLETE CBC W/AUTO DIFF WBC: CPT

## 2023-11-29 PROCEDURE — 99232 SBSQ HOSP IP/OBS MODERATE 35: CPT | Performed by: NURSE PRACTITIONER

## 2023-11-29 PROCEDURE — 770001 HCHG ROOM/CARE - MED/SURG/GYN PRIV*

## 2023-11-29 PROCEDURE — 700105 HCHG RX REV CODE 258: Performed by: SURGERY

## 2023-11-29 PROCEDURE — 80053 COMPREHEN METABOLIC PANEL: CPT

## 2023-11-29 PROCEDURE — 83690 ASSAY OF LIPASE: CPT

## 2023-11-29 PROCEDURE — A9270 NON-COVERED ITEM OR SERVICE: HCPCS | Performed by: PHYSICIAN ASSISTANT

## 2023-11-29 RX ORDER — FAMOTIDINE 20 MG/1
20 TABLET, FILM COATED ORAL 2 TIMES DAILY
Status: DISCONTINUED | OUTPATIENT
Start: 2023-11-29 | End: 2023-11-30 | Stop reason: HOSPADM

## 2023-11-29 RX ORDER — AMOXICILLIN AND CLAVULANATE POTASSIUM 875; 125 MG/1; MG/1
1 TABLET, FILM COATED ORAL 2 TIMES DAILY
Qty: 13 TABLET | Refills: 0 | Status: ACTIVE | OUTPATIENT
Start: 2023-11-29 | End: 2023-12-07

## 2023-11-29 RX ADMIN — DOCUSATE SODIUM 100 MG: 100 CAPSULE, LIQUID FILLED ORAL at 05:53

## 2023-11-29 RX ADMIN — ACETAMINOPHEN 1000 MG: 500 TABLET, FILM COATED ORAL at 13:06

## 2023-11-29 RX ADMIN — ENOXAPARIN SODIUM 40 MG: 100 INJECTION SUBCUTANEOUS at 18:35

## 2023-11-29 RX ADMIN — PIPERACILLIN AND TAZOBACTAM 3.38 G: 3; .375 INJECTION, POWDER, FOR SOLUTION INTRAVENOUS at 05:53

## 2023-11-29 RX ADMIN — OXYCODONE 5 MG: 5 TABLET ORAL at 10:46

## 2023-11-29 RX ADMIN — ACETAMINOPHEN 1000 MG: 500 TABLET, FILM COATED ORAL at 23:26

## 2023-11-29 RX ADMIN — ONDANSETRON 4 MG: 4 TABLET, ORALLY DISINTEGRATING ORAL at 18:35

## 2023-11-29 RX ADMIN — MAGNESIUM HYDROXIDE 30 ML: 1200 LIQUID ORAL at 05:54

## 2023-11-29 RX ADMIN — PIPERACILLIN AND TAZOBACTAM 3.38 G: 3; .375 INJECTION, POWDER, FOR SOLUTION INTRAVENOUS at 21:39

## 2023-11-29 RX ADMIN — ACETAMINOPHEN 1000 MG: 500 TABLET, FILM COATED ORAL at 05:53

## 2023-11-29 RX ADMIN — SODIUM CHLORIDE, POTASSIUM CHLORIDE, SODIUM LACTATE AND CALCIUM CHLORIDE: 600; 310; 30; 20 INJECTION, SOLUTION INTRAVENOUS at 21:40

## 2023-11-29 RX ADMIN — ACETAMINOPHEN 1000 MG: 500 TABLET, FILM COATED ORAL at 00:27

## 2023-11-29 RX ADMIN — OXYCODONE 5 MG: 5 TABLET ORAL at 03:23

## 2023-11-29 RX ADMIN — ACETAMINOPHEN 1000 MG: 500 TABLET, FILM COATED ORAL at 18:34

## 2023-11-29 RX ADMIN — FAMOTIDINE 20 MG: 20 TABLET ORAL at 18:34

## 2023-11-29 RX ADMIN — PIPERACILLIN AND TAZOBACTAM 3.38 G: 3; .375 INJECTION, POWDER, FOR SOLUTION INTRAVENOUS at 13:08

## 2023-11-29 RX ADMIN — OXYCODONE 5 MG: 5 TABLET ORAL at 21:39

## 2023-11-29 ASSESSMENT — PAIN DESCRIPTION - PAIN TYPE
TYPE: ACUTE PAIN;SURGICAL PAIN
TYPE: ACUTE PAIN
TYPE: SURGICAL PAIN
TYPE: ACUTE PAIN;SURGICAL PAIN
TYPE: ACUTE PAIN

## 2023-11-29 ASSESSMENT — ENCOUNTER SYMPTOMS
ABDOMINAL PAIN: 1
SHORTNESS OF BREATH: 0
FLANK PAIN: 0
BLOOD IN STOOL: 0
SORE THROAT: 0
NAUSEA: 0
WEAKNESS: 0
ORTHOPNEA: 0
VOMITING: 0
MYALGIAS: 0
FALLS: 0
FEVER: 0
HEADACHES: 0
CONSTIPATION: 0
DIARRHEA: 0
COUGH: 0
NERVOUS/ANXIOUS: 0
CHILLS: 0

## 2023-11-29 ASSESSMENT — LIFESTYLE VARIABLES: SUBSTANCE_ABUSE: 0

## 2023-11-29 NOTE — PROGRESS NOTES
"    DATE: 11/29/2023    Hospital Day 3  admitted with intra-abdominal fluid collection status post laparoscopic cholecystectomy .    INTERVAL EVENTS:  ERCP with stent placement yesterday.  Pain improved, epigastric pain with PO.  WBC trending down, 6.6.    REVIEW OF SYSTEMS:  Review of Systems   Constitutional:  Negative for chills and fever.   Respiratory:  Negative for cough and shortness of breath.    Gastrointestinal:  Positive for abdominal pain. Negative for nausea and vomiting.   Genitourinary:         Voiding       PHYSICAL EXAMINATION:  Vital Signs: BP 96/58   Pulse 72   Temp 36.6 °C (97.9 °F) (Temporal)   Resp 17   Ht 1.575 m (5' 2\")   Wt 66.5 kg (146 lb 9.7 oz)   SpO2 98%   Physical Exam  Vitals and nursing note reviewed.   Constitutional:       General: She is awake. She is not in acute distress.     Appearance: Normal appearance. She is not ill-appearing.   Abdominal:      General: There is no distension.      Palpations: Abdomen is soft.      Tenderness: There is generalized abdominal tenderness.      Comments: Laparoscopic incisions CDI with Dermabond   Neurological:      Mental Status: She is alert.   Psychiatric:         Behavior: Behavior is cooperative.         LABORATORY VALUES:   Recent Labs     11/27/23 0455 11/28/23 0449 11/29/23 0512   WBC 17.0* 11.2* 6.6   RBC 4.53 3.70* 3.10*   HEMOGLOBIN 14.6 12.3 10.2*   HEMATOCRIT 42.0 34.3* 28.7*   MCV 92.7 92.7 92.6   MCH 32.2 33.2* 32.9   MCHC 34.8 35.9* 35.5   RDW 42.6 41.9 40.2   PLATELETCT 223 210 189   MPV 10.1 9.9 10.2       Recent Labs     11/27/23 0455 11/28/23 0449 11/29/23 0512   SODIUM 137 138 139   POTASSIUM 3.9 3.9 3.6   CHLORIDE 103 106 108   CO2 19* 23 23   GLUCOSE 102* 102* 94   BUN 11 6* 6*   CREATININE 0.67 0.61 0.49*   CALCIUM 9.1 8.1* 8.1*       Recent Labs     11/27/23 0455 11/28/23 0449 11/29/23 0512   ASTSGOT 226* 63* 43   ALTSGPT 767* 405* 260*   TBILIRUBIN 1.2 1.0 0.6   ALKPHOSPHAT 160* 124* 112*   GLOBULIN 3.2 " 2.5 2.6              IMAGING:   DX-PORTABLE FLUOROSCOPY < 1 HOUR Is the patient pregnant? No   Preliminary Result      Portable fluoroscopy utilized for 1 minute 18 seconds.         INTERPRETING LOCATION: [address], [place], [address]      NM-HEPATOBILIARY SCAN   Final Result      Small focal accumulation of radiotracer in the gallbladder fossa suggesting bile leak.      No findings of biliary obstruction.      CT-ABDOMEN-PELVIS WITH   Final Result         1.  Intra-abdominal free air, nonspecific and within expected limits for recent postop changes. Radiographic appearance cannot exclude component of viscus perforation.   2.  Small free fluid collection the gallbladder fossa, could represent postop hematoma, seroma, biloma, or early forming abscess   3.  Small quantity of perihepatic and pelvic ascites   4.  Fibroid uterine changes          Radiology images reviewed, Labs reviewed and Medications reviewed  Atkins catheter: No Atkins      DVT Prophylaxis: Enoxaparin (Lovenox)  DVT prophylaxis - mechanical: SCDs  Ulcer prophylaxis: Yes  Antibiotics: Treating active infection/contamination beyond 24 hours perioperative coverage  Assessed for rehab: Patient returned to prior level of function, rehabilitation not indicated at this time      ASSESSMENT AND PLAN:   * Abdominal pain- (present on admission)  Assessment & Plan  SP lap darrion on 11/25  Presents with abd pain  LFTs mildly elevated  CT shows fluid in GB fossa  HIDA with bile leak.  11/28 ERCP sphincterotomy balloon sweep and placement of 10 Swiss by 7 cm plastic biliary stent.      - Check lipase  - Diet as tolerated  - Continue IV abx  - May discharge home this afternoon if improved      Discussed patient condition with Family, RN, Patient, and trauma surgery. Dr. Bowers

## 2023-11-29 NOTE — PROGRESS NOTES
Report received from RN, assumed care at 1845  Pt is A0X4, and responds appropriately   Pt declines any SOB, chest pain,  no new onset of numbness/ tingling  Pt rates pain at 6/10, on a scale of 1-10, pt medicated per MAR  Pt is voiding adequatly and without hesitancy  Pt has not passing flatus, hypoactive  bowel sounds, BM on PTA for 11/25 procedure  Pt ambulates with a stanby assist   Pt is tolerating a NPO diet, pt denies any nausea/vomiting  Plan of care discussed, all questions answered. Explained importance of calling before getting OOB and pt verbalizes understanding. Explained importance of oral care. Call light is within reach, treaded slipper socks on, bed in lowest/ locked position, hourly rounding in place, all needs met at this time

## 2023-11-29 NOTE — CARE PLAN
The patient is Stable - Low risk of patient condition declining or worsening    Shift Goals  Clinical Goals: endoscopy today, decrease pain level  Patient Goals: pain control  Family Goals: LOU    Progress made toward(s) clinical / shift goals:  Patient currently intra-op for endoscopy and sphincterotomy. Pain level tolerated pre-op with IV morphine    Patient is not progressing towards the following goals:

## 2023-11-29 NOTE — PROGRESS NOTES
4 Eyes Skin Assessment Completed by ZAC Decker and ZAC Carter.     Head WDL  Ears WDL  Nose WDL  Mouth WDL  Neck WDL  Breast/Chest WDL  Shoulder Blades WDL  Spine WDL  (R) Arm/Elbow/Hand WDL  (L) Arm/Elbow/Hand WDL  Abdomen Incision - x4 lap sites from prior surgery  Groin WDL  Scrotum/Coccyx/Buttocks WDL  (R) Leg WDL  (L) Leg WDL  (R) Heel/Foot/Toe WDL  (L) Heel/Foot/Toe WDL              Devices In Places Pulse Ox, BP cuff        Interventions In Place Pillows and Pressure Redistribution Mattress     Possible Skin Injury No     Pictures Uploaded Into Epic N/A  Wound Consult Placed N/A  RN Wound Prevention Protocol Ordered No

## 2023-11-29 NOTE — PROCEDURES
.Endoscopic Retrograde Cholangiopancreatography    Date of Procedure:  11/28/2023  Attending Physician:  Papi Davis MD  Indications: Bile leak      Instrument: Olympus Flexible Sideviewing Endoscope  Sedation:       Surgeon(s):  Papi Davis M.D.    Anesthesiologist/Type of Anesthesia:  Anesthesiologist: Oscar Devine M.D./General    Surgical Staff:  Endoscopy Technician: Malia Bills  Radiology Technologist: Ender Eaton; Orlin Dubose  Endoscopy Nurse: Verónica Govea R.N.; Evan Antoine R.N.      Pre-Anesthesia Assessment:  Prior to the procedure, a History and Physical was performed, and patient medications and allergies were reviewed. The patient’s tolerance of previous anesthesia was also reviewed. The risks and benefits of the procedure and the sedation options and risks were discussed with the patient including but not limited to infection, bleeding, aspiration, perforation, adverse medication reaction, missed diagnosis, missed lesions, and pancreatitis. The patient verbalized understanding. All questions were answered, and informed consent was obtained    After I obtained informed consent from the patient, the patient was placed in the prone/swimmer position. Appropriate time-out protocol was followed: the correct patient, the correct procedure, and the correct equipment in the room were confirmed. Throughout the procedure, the patient’s blood pressure, pulse, and oxygen saturations were monitored continuously. The Olympus flexible sideviewing duodenoscope was inserted through the oropharynx, esophagus intubated, then advanced to the gastrointestinal tract to the major papilla. The duct(s) were cannulated and contrast was injected I personally interpreted the ductal images.  Findings and interventions were performed and documented below. Air was then withdrawn and the duodenoscope was removed. The patient tolerated the procedure well. There were no immediate postoperative  complications    Findings:     film demonstrated right upper quadrant cholecystectomy clips.  Scope was inserted to the second portion duodenum without difficulty.  Papilla appeared normal.  Cannulation with a 0.025 wire sphincterotome was pure.  Throughout the entire procedure pancreatic duct was not cannulated or injected.  Wire was able to entered the bile duct and advanced into the intrahepatic ductal portion.  Aspiration confirmed bile.  Test cholangiodefine anatomy.  A small traction biliary sphincterotomy was performed to edge of transverse fold.  No significant bleeding was noted.  Balloon sweep was then performed with a 9-12 mm extractor balloon demonstrating no stones or sludge.  Stepwise occlusion cholangiogram demonstrated extravasation of contrast on the right lobe area near the liver when injecting at intrahepatic portion of the biliary tree.  This is suggestive of possibility of a duct of Luschka leak.  Further occlusion cholangiogram demonstrated no extravasation of contrast near the cystic duct or desisted without clip.  Impacted cholangiogram demonstrated no extravasation of contrast at the sphincterotomy site.  Subsequently a 10 Kinyarwanda by 7 cm plastic biliary stent was in place.  Bilateral films under diaphragm was negative for free air.  Pancreatic duct was never cannulated or injected    Impressions:   Extravasation of contrast noted near the gallbladder fossa likely secondary to a duct of Luschka leak; treated by ERCP sphincterotomy balloon sweep and placement of 10 Kinyarwanda by 7 cm plastic biliary stent  No extravasation contrast had the location of the cystic duct remnant/surgical cholecystectomy clips    Recommendations:   Monitor for postprocedure complication including perforation bleeding pancreatitis  Please note patient is at high risk for post ERCP pancreatitis given age and gender; indomethacin 100 mg suppository was given.  Additional lactated Ringer will be ordered  Clear liquid  diet in 4 hours if no significant pain then advance as tolerated  Trend LFTs  Repeat ERCP in 3 months to ensure resolution of bile leak and stent removal  Continue antibiotics for now      NOTE: Radiologic interpretation of dynamic and static fluoroscopic imaging by myself.  At no time was/were a Radiologist present.     This note was generated using voice recognition software which has a small chance of producing errors of grammar and possibly content. I have made every reasonable attempt to find and correct any obvious errors, but expect that some may not be found prior to finalization of this note

## 2023-11-29 NOTE — ANESTHESIA TIME REPORT
Anesthesia Start and Stop Event Times       Date Time Event    11/28/2023 1550 Ready for Procedure     1604 Anesthesia Start     1642 Anesthesia Stop          Responsible Staff  11/28/23      Name Role Begin End    Oscar Devine M.D. Anesth 1604 1642          Overtime Reason:  no overtime (within assigned shift)    Comments:

## 2023-11-29 NOTE — DISCHARGE SUMMARY
DISCHARGE  SUMMARY    DATE OF ADMISSION: 11/27/2023    DATE OF DISCHARGE: 11/30/2023    DISCHARGE DIAGNOSIS:  Principal Problem:    Abdominal pain  Resolved Problems:    * No resolved hospital problems. *      CONSULTATIONS:  Annie Hoover M.D. Gastroenterology.    PROCEDURES:  ERCP with sphincterotomy, balloon sweep and stent placement.  MD Kilo. Gastroenterology.    BRIEF HPI and HOSPITAL COURSE:  This is a pleasant 38-year-old female who presented to the emergency department with a 2-day history of moderate right subcostal abdominal pain.  She was status post laparoscopic cholecystectomy 11/25/2023 for cholelithiasis.  At the time of admission the patient's LFTs were mildly elevated and she had a CT scan that showed fluid in the gallbladder fossa.  She underwent a HIDA scan which showed a bile leak.  The patient was taken to the operating theater for the above procedure.  The patient had a normal post procedural course.  The patient was initiated on clear liquids and advance to regular diet.  Her pain improved.  On the day of discharge patient is afebrile, vital signs stable, tolerating regular diet, ambulating without assistance and pain controlled on current regimen.  Discharge instructions, medications and follow-up were discussed with the patient in detail.  The patient was then discharged home in stable condition with her family.    DISPOSITION:   Discharged home in stable condition with family on 11/30/2023. The patient and family were counseled and questions were answered. Specifically, signs and symptoms of infection, respiratory decompensation and persistent or worsening pain were discussed and the patient agrees to seek medical attention if any of these develop.    DISCHARGE MEDICATIONS:  The patients controlled substance history was reviewed and a controlled substance use informed consent (if applicable) was provided by [facility] and the patient has been prescribed.      Medication List        START taking these medications        Instructions   amoxicillin-clavulanate 875-125 MG Tabs  Commonly known as: Augmentin   [place] 1 tableta por vía oral 2 veces al día por 7 días.  (Take 1 Tablet by mouth 2 times a day for 7 days.)  Dose: 1 Tablet     ondansetron 4 MG Tbdp  Commonly known as: Zofran ODT   Take 1 Tablet by mouth every 8 hours as needed for Nausea/Vomiting for up to 6 days.  Dose: 4 mg     oxyCODONE immediate-release 5 MG Tabs  Commonly known as: Roxicodone   Take 0.5-1 Tablets by mouth every 6 hours as needed for Severe Pain for up to 5 days.  Dose: 2.5-5 mg            CONTINUE taking these medications        Instructions   acetaminophen 500 MG Tabs  Commonly known as: Tylenol   Take 2 Tablets by mouth every 6 hours.  Dose: 1,000 mg     cimetidine 300 MG Tabs  Commonly known as: Tagamet   Take 300 mg by mouth every evening.  Dose: 300 mg     Ibsrela 50 MG Tabs  Generic drug: Tenapanor HCl   Take 50 mg by mouth 2 times a day.  Dose: 50 mg     ibuprofen 800 MG Tabs  Commonly known as: Motrin   Take 1 Tablet by mouth every 8 hours as needed for Mild Pain, Moderate Pain or Inflammation.  Dose: 800 mg     omeprazole 20 MG delayed-release capsule  Commonly known as: PriLOSEC   Take 20 mg by mouth every day.  Dose: 20 mg     therapeutic multivitamin-minerals Tabs   Take 1 Tablet by mouth every day.  Dose: 1 Tablet            STOP taking these medications      oxyCODONE-acetaminophen 5-325 MG Tabs  Commonly known as: Percocet            ACTIVITY:  No lifting heavier than 15 pounds for 2 weeks.  No strenuous activities for 4 weeks.    WOUND CARE:  May shower.  No tub soaking or swimming for 2 weeks.    DIET:  Regular diet.    FOLLOW UP:  Papi Davis M.D.  [id number]  [place] [phone]    Follow up  Seguimiento con Dr. Davis para la extracción del stent.    [facility]  [address]  [place] [id number] [phone]  Schedule an  appointment as soon as possible for a visit in 1 week(s)  For post operative follow up, If symptoms worsen, As needed      TIME SPENT ON DISCHARGE: 36 minutes      ____________________________________________  Kristan Dubose P.A.-C.    DD: 11/29/2023 12:23 PM

## 2023-11-29 NOTE — PROGRESS NOTES
Gastroenterology Progress Note               Author:  JANINE Blum Date & Time Created: 11/29/2023 1:23 PM       Patient ID:  Name:             Poly Adams    YOB: 1985  Age:                 38 y.o.  female  MRN:               [id number]        Medical Decision Making, by Problem:  Active Hospital Problems    Diagnosis     Abdominal pain [R10.9]            Presenting Chief Complaint:  Bile leak        History of Present Illness:    This is a very pleasant 38 y.o. female s/p laparoscopic cholecystectomy 11/25/23 for exacerbation of chronic cholecystitis.  Liver tests that am were , ,  with bilirubin 1.0.  US showed cholelithiasis but no common bile duct dilation.     Patient presented to ER this morning for feeling dizzy and lightheaded with shortness of breath.  She was complaining of abdominal pain and diaphoresis. WBC 17, , ,  and bilirubin 1.2.  CT abd/pelvis with shows fluid collection in gallbladder fossa, 2.3 x 4.5 cm. HIDA scan confirmed bile leak and no biliary obstruction.      Family member at bedside and acting as .  Patient continue to have upper abdominal pain.      Interval History:  11/29/2023:  Anna richter.  Patient seen at bedside with family.  Postprocedure day #1 s/p ERCP with biliary stent placement.  Patient reports some mild chest and minimal abdominal pain.  She states chest pain was present prior to procedure and is less today.  She is tolerating oral intake without difficulty.  Afebrile, no leukocytosis, hemoglobin stable.        Hospital Medications:  Current Facility-Administered Medications   Medication Dose Frequency Provider Last Rate Last Admin    enoxaparin (Lovenox) inj 40 mg  40 mg DAILY AT 1800 LESLEY FergusonADex-CDex        acetaminophen (Tylenol) tablet 1,000 mg  1,000 mg Q6HRS JANNETTE Ferguson.-C.   1,000 mg at 11/29/23 1306    oxyCODONE immediate-release  (Roxicodone) tablet 5 mg  5 mg Q4HRS PRN CLIFFORD Ferguson-RICARDA   5 mg at 11/29/23 1046    Respiratory Therapy Consult   Continuous RT Shante Bowers M.D.        Pharmacy Consult Request ...Pain Management Review 1 Each  1 Each PHARMACY TO DOSE Shante Bowers M.D.        ondansetron (Zofran) syringe/vial injection 4 mg  4 mg Q4HRS PRN Shante Bowers M.D.   4 mg at 11/28/23 0922    ondansetron (Zofran ODT) dispertab 4 mg  4 mg Q4HRS PRN Shante Bowers M.D.   4 mg at 11/28/23 2224    docusate sodium (Colace) capsule 100 mg  100 mg BID Shante Bowers M.D.   100 mg at 11/29/23 0553    senna-docusate (Pericolace Or Senokot S) 8.6-50 MG per tablet 1 Tablet  1 Tablet Nightly Shante Bowers M.D.   1 Tablet at 11/28/23 2145    senna-docusate (Pericolace Or Senokot S) 8.6-50 MG per tablet 1 Tablet  1 Tablet Q24HRS PRN Shante Bowers M.D.        polyethylene glycol/lytes (Miralax) PACKET 1 Packet  1 Packet BID Shante Bowers M.D.   1 Packet at 11/27/23 1033    magnesium hydroxide (Milk Of Magnesia) suspension 30 mL  30 mL DAILY Shante Bowers M.D.   30 mL at 11/29/23 0554    bisacodyl (Dulcolax) suppository 10 mg  10 mg Q24HRS PRN Shante Bowers M.D.        sodium phosphate (Fleet) enema 133 mL  1 Each Once PRN Shante Bowers M.D.        LR infusion   Continuous Shante Bowers M.D. 100 mL/hr at 11/28/23 1956 New Bag at 11/28/23 1956    morphine 4 MG/ML injection 2 mg  2 mg Q HOUR PRN Shante Bowers M.D.   2 mg at 11/28/23 0922    Or    morphine 4 MG/ML injection 4 mg  4 mg Q HOUR PRN Shante Bowers M.D.   4 mg at 11/28/23 1126    piperacillin-tazobactam (Zosyn) 3.375 g in  mL IVPB  3.375 g Q8HRS Shante Bowers M.D. 25 mL/hr at 11/29/23 1308 3.375 g at 11/29/23 1308   Last reviewed on 11/27/2023  8:16 AM by Alma Crespo PhT       Review of Systems:  Review of Systems   Constitutional:  Negative for chills, fever and malaise/fatigue.   HENT:  Negative for congestion and sore throat.    Respiratory:   "Negative for cough and shortness of breath.    Cardiovascular:  Negative for orthopnea and leg swelling.        Mild chest discomfort. Improved compared to yesterday   Gastrointestinal:  Positive for abdominal pain. Negative for blood in stool, constipation, diarrhea, melena, nausea and vomiting.   Genitourinary:  Negative for dysuria and flank pain.   Musculoskeletal:  Negative for falls and myalgias.   Neurological:  Negative for weakness and headaches.   Psychiatric/Behavioral:  Negative for substance abuse. The patient is not nervous/anxious.    All other systems reviewed and are negative.        Vital signs:  Weight/BMI: Body mass index is 26.81 kg/m².  BP 96/58   Pulse 72   Temp 36.6 °C (97.9 °F) (Temporal)   Resp 17   Ht 1.575 m (5' 2\")   Wt 66.5 kg (146 lb 9.7 oz)   SpO2 98%   Vitals:    11/29/23 0013 11/29/23 0117 11/29/23 0429 11/29/23 0725   BP: 91/59 95/58 96/60 96/58   Pulse: 76 72 68 72   Resp: 18 18 18 17   Temp: 36.6 °C (97.9 °F) 36.2 °C (97.2 °F) 36.6 °C (97.9 °F) 36.6 °C (97.9 °F)   TempSrc: Temporal Temporal Temporal Temporal   SpO2: 96% 95% 96% 98%   Weight:       Height:         Oxygen Therapy:  Pulse Oximetry: 98 %, O2 (LPM): 0, O2 Delivery Device: None - Room Air    Intake/Output Summary (Last 24 hours) at 11/29/2023 1323  Last data filed at 11/29/2023 0745  Gross per 24 hour   Intake 1132 ml   Output --   Net 1132 ml         Physical Exam:  Physical Exam  Vitals and nursing note reviewed.   Constitutional:       General: She is awake.      Appearance: Normal appearance. She is well-developed, well-groomed and overweight.   HENT:      Head: Normocephalic.      Nose: Nose normal. No congestion.      Mouth/Throat:      Mouth: Mucous membranes are moist.      Pharynx: Oropharynx is clear. No oropharyngeal exudate.   Eyes:      General: No scleral icterus.     Extraocular Movements: Extraocular movements intact.      Conjunctiva/sclera: Conjunctivae normal.   Cardiovascular:      Rate and " Rhythm: Normal rate and regular rhythm.      Pulses: Normal pulses.      Heart sounds: Normal heart sounds. No murmur heard.  Pulmonary:      Effort: Pulmonary effort is normal. No respiratory distress.      Breath sounds: Normal breath sounds.   Abdominal:      General: Abdomen is flat. Bowel sounds are normal. There is no distension.      Palpations: Abdomen is soft.      Tenderness: There is no guarding.      Comments: Laparoscopic incisions to abdomen.  Well-approximated with Dermabond.  No surrounding erythema, edema, drainage.  Mild abdominal tenderness with palpation.   Musculoskeletal:         General: Normal range of motion.      Right lower leg: No edema.      Left lower leg: No edema.   Skin:     General: Skin is warm and dry.      Capillary Refill: Capillary refill takes less than 2 seconds.      Coloration: Skin is not jaundiced.   Neurological:      General: No focal deficit present.      Mental Status: She is alert and oriented to person, place, and time. Mental status is at baseline.   Psychiatric:         Mood and Affect: Mood normal.         Behavior: Behavior normal. Behavior is cooperative.         Thought Content: Thought content normal.         Judgment: Judgment normal.             Labs:  Recent Labs     11/27/23 0455 11/28/23 0449 11/29/23 0512   SODIUM 137 138 139   POTASSIUM 3.9 3.9 3.6   CHLORIDE 103 106 108   CO2 19* 23 23   BUN 11 6* 6*   CREATININE 0.67 0.61 0.49*   CALCIUM 9.1 8.1* 8.1*     Recent Labs     11/27/23 0455 11/28/23 0449 11/29/23  0512   ALTSGPT 767* 405* 260*   ASTSGOT 226* 63* 43   ALKPHOSPHAT 160* 124* 112*   TBILIRUBIN 1.2 1.0 0.6   LIPASE 41  --  77   GLUCOSE 102* 102* 94     Recent Labs     11/27/23 0455 11/28/23 0449 11/29/23  0512   WBC 17.0* 11.2* 6.6   NEUTSPOLYS 85.60* 72.30* 60.00   LYMPHOCYTES 5.80* 17.60* 25.90   MONOCYTES 7.80 9.00 11.30   EOSINOPHILS 0.10 0.40 2.30   BASOPHILS 0.10 0.30 0.30   ASTSGOT 226* 63* 43   ALTSGPT 767* 405* 260*    ALKPHOSPHAT 160* 124* 112*   TBILIRUBIN 1.2 1.0 0.6     Recent Labs     11/27/23  0455 11/28/23  0449 11/29/23  0512   RBC 4.53 3.70* 3.10*   HEMOGLOBIN 14.6 12.3 10.2*   HEMATOCRIT 42.0 34.3* 28.7*   PLATELETCT 223 210 189     Recent Results (from the past 24 hour(s))   CBC with Differential: Tomorrow AM    Collection Time: 11/29/23  5:12 AM   Result Value Ref Range    WBC 6.6 4.8 - 10.8 K/uL    RBC 3.10 (L) 4.20 - 5.40 M/uL    Hemoglobin 10.2 (L) 12.0 - 16.0 g/dL    Hematocrit 28.7 (L) 37.0 - 47.0 %    MCV 92.6 81.4 - 97.8 fL    MCH 32.9 27.0 - 33.0 pg    MCHC 35.5 32.2 - 35.5 g/dL    RDW 40.2 35.9 - 50.0 fL    Platelet Count 189 164 - 446 K/uL    MPV 10.2 9.0 - 12.9 fL    Neutrophils-Polys 60.00 44.00 - 72.00 %    Lymphocytes 25.90 22.00 - 41.00 %    Monocytes 11.30 0.00 - 13.40 %    Eosinophils 2.30 0.00 - 6.90 %    Basophils 0.30 0.00 - 1.80 %    Immature Granulocytes 0.20 0.00 - 0.90 %    Nucleated RBC 0.00 0.00 - 0.20 /100 WBC    Neutrophils (Absolute) 3.97 1.82 - 7.42 K/uL    Lymphs (Absolute) 1.71 1.00 - 4.80 K/uL    Monos (Absolute) 0.75 0.00 - 0.85 K/uL    Eos (Absolute) 0.15 0.00 - 0.51 K/uL    Baso (Absolute) 0.02 0.00 - 0.12 K/uL    Immature Granulocytes (abs) 0.01 0.00 - 0.11 K/uL    NRBC (Absolute) 0.00 K/uL   Comp Metabolic Panel (CMP): Tomorrow AM    Collection Time: 11/29/23  5:12 AM   Result Value Ref Range    Sodium 139 135 - 145 mmol/L    Potassium 3.6 3.6 - 5.5 mmol/L    Chloride 108 96 - 112 mmol/L    Co2 23 20 - 33 mmol/L    Anion Gap 8.0 7.0 - 16.0    Glucose 94 65 - 99 mg/dL    Bun 6 (L) 8 - 22 mg/dL    Creatinine 0.49 (L) 0.50 - 1.40 mg/dL    Calcium 8.1 (L) 8.5 - 10.5 mg/dL    Correct Calcium 9.1 8.5 - 10.5 mg/dL    AST(SGOT) 43 12 - 45 U/L    ALT(SGPT) 260 (H) 2 - 50 U/L    Alkaline Phosphatase 112 (H) 30 - 99 U/L    Total Bilirubin 0.6 0.1 - 1.5 mg/dL    Albumin 2.7 (L) 3.2 - 4.9 g/dL    Total Protein 5.3 (L) 6.0 - 8.2 g/dL    Globulin 2.6 1.9 - 3.5 g/dL    A-G Ratio 1.0 g/dL    ESTIMATED GFR    Collection Time: 11/29/23  5:12 AM   Result Value Ref Range    GFR (CKD-EPI) 124 >60 mL/min/1.73 m 2   LIPASE    Collection Time: 11/29/23  5:12 AM   Result Value Ref Range    Lipase 77 11 - 82 U/L       Radiology Review:  DX-PORTABLE FLUOROSCOPY < 1 HOUR Is the patient pregnant? No   Final Result      Portable fluoroscopy utilized for 1 minute 18 seconds.         INTERPRETING LOCATION: [address], [place], [address]      NM-HEPATOBILIARY SCAN   Final Result      Small focal accumulation of radiotracer in the gallbladder fossa suggesting bile leak.      No findings of biliary obstruction.      CT-ABDOMEN-PELVIS WITH   Final Result         1.  Intra-abdominal free air, nonspecific and within expected limits for recent postop changes. Radiographic appearance cannot exclude component of viscus perforation.   2.  Small free fluid collection the gallbladder fossa, could represent postop hematoma, seroma, biloma, or early forming abscess   3.  Small quantity of perihepatic and pelvic ascites   4.  Fibroid uterine changes            MDM (Data Review):   -Records reviewed and summarized in current documentation  -I personally reviewed and interpreted the laboratory results  -I personally reviewed the radiology images    Assessment/Recommendations:  Impression:   Bile leak s/p ERCP with biliary stent   S/p laparoscopic cholecystectomy   Abnormal liver tests-improved   Leukocytosis-resolved      MDM:  This is a 38-year-old female with a past medical history as listed above who presented [facility] with a 2-day history of moderate right subcostal abdominal pain.  She is s/p laparoscopic cholecystectomy on 11/25/2023 for cholelithiasis.  LFTs are mildly elevated.  CT abdomen showed fluid in gallbladder fossa and subsequent HIDA scan showed bile leak.  Patient underwent ERCP with biliary stent placement on 11/28/2023.  During procedure, extravasation of contrast noted near  the gallbladder fossa likely secondary to a duct of Luschka leak; treated by ERCP sphincterotomy balloon sweep and placement of 10 B[place] by 7 cm plastic biliary stent. No extravasation contrast had the location of the cystic duct remnant/surgical cholecystectomy clips. Post procedurally, LFT's decreasing/normalizing.  No leukocytosis, hemoglobin stable.  Patient tolerating oral intake without difficulty.  Low suspicion for post ERCP bleeding, infection, pancreatitis.  Due to presence of biliary stent, patient will need repeat ERCP in 3 months to assure resolution of bile leak and stent removal.  This was discussed with patient in addition to stressing the importance of time and removal due to risks of infection and not following up.  Patient verbalized understanding is agreeable to do so.    Plan:  Diet per primary team  Avoid NSAIDs for the next 5 days due to risk of bleeding with sphincterotomy  Follow-up with Dr. Davis for repeat ERCP to assess resolution of bile leak and for stent removal-referral placed.  Antibiotics per primary team    No further inventions from acute GI team.  GI to sign off.  Please reconsult for any further questions or concerns.      Discussed with patient and family at bedside via iPad , Kristan CARPENTER, Dr. Shields.        Core Quality Measures   Reviewed items::  Labs, Medications and Radiology reports reviewed

## 2023-11-29 NOTE — OR NURSING
1640 - Pt to PACU 7 from OR.  Bedside report from anesthesiologist and RN.  Attached to monitoring, VSS, breathing is calm and unlabored.      1650 - Report to bernrado Decker RN    1705 - Dr. Davis bedside to discuss findings with patient, used VEE Lin to interpret in American.  Pt had some ice chips, VSS, on room air. Dr. Davis ordered 2 liters of LR to be administered to patient as she is high risk for pancreatitis. 500 mL LR left to be administered from first liter.     1725 -  bedside.  Delia explained results to him as well in American.     1755 - Pt stable to go back to floor.  Taken with transport on gurney back to room.  Pt has glasses.  VSS, room air.

## 2023-11-29 NOTE — ANESTHESIA POSTPROCEDURE EVALUATION
Patient: Poly Soto    Procedure Summary       Date: 11/28/23 Room / Location: [facility] ROOM 26 / SURGERY SAME DAY [facility]    Anesthesia Start: 1604 Anesthesia Stop: 1642    Procedures:       ERCP (ENDOSCOPIC RETROGRADE CHOLANGIOPANCREATOGRAPHY) (Esophagus)      SPHINCTEROTOMY (Esophagus)      INSERTION, STENT, BILE DUCT (Esophagus) Diagnosis: (CBD Stent Placement, Bile Leak)    Surgeons: Papi Davis M.D. Responsible Provider: Oscar Devine M.D.    Anesthesia Type: general ASA Status: 2            Final Anesthesia Type: general  Last vitals  BP   Blood Pressure: 116/66    Temp   37.4 °C (99.3 °F)    Pulse   (!) 115   Resp   (!) 24    SpO2   99 %      Anesthesia Post Evaluation    Patient location during evaluation: PACU  Patient participation: complete - patient participated  Level of consciousness: awake and alert  Pain score: 0    Airway patency: patent  Anesthetic complications: no  Cardiovascular status: hemodynamically stable  Respiratory status: acceptable  Hydration status: euvolemic    PONV: none          No notable events documented.     Nurse Pain Score: 2 (NPRS)

## 2023-11-29 NOTE — CARE PLAN
The patient is Stable - Low risk of patient condition declining or worsening    Shift Goals  Clinical Goals: pain controlled to 5 or less during shift  Patient Goals: sleep, tolerate diet  Family Goals: LOU    Progress made toward(s) clinical / shift goals:  pt pain was maintained at a 5 or less during shift    Patient is not progressing towards the following goals:      Problem: Pain - Standard  Goal: Alleviation of pain or a reduction in pain to the patient’s comfort goal  Outcome: Progressing     Problem: Knowledge Deficit - Standard  Goal: Patient and family/care givers will demonstrate understanding of plan of care, disease process/condition, diagnostic tests and medications  Outcome: Progressing

## 2023-11-29 NOTE — DISCHARGE INSTRUCTIONS
Instrucciones postoperatorias    1. DIETA: Al recibir el rosanne del hospital, puede reanudar pathak dieta preoperatoria normal, a menos que se le indique específicamente lo contrario. Dependiendo de cómo te sientas y de si tienes náuseas o no, es posible que desees seguir dustin dieta blanda kumar los primeros días. Sin embargo, puedes avanzar en esto hand rápido terell te sientas preparado.    2. ACTIVIDADES: Después del rosanne del hospital, podrá reanudar todas lisa actividades de rutina; sin embargo, no debe levantar objetos pesados (más de 20 libras o dustin bolsa de comestibles) ni realizar actividades extenuantes kumar al menos 2 semanas.    3. CONDUCIR: Puede conducir siempre que no esté tomando analgésicos y pueda realizar las actividades necesarias para conducir, es decir, girar, doblarse, torcerse, etc.    4. BAÑO: Puede mojar la herida en cualquier momento después de salir del hospital. Puede ducharse, jazmine no sumergirse en un baño kumar al menos dos semanas.   Tiene pegamento para piel en la herida, fredo se caerá solo, no lo toque.    5. FUNCIÓN INTESTINAL:   Después de la cirugía, no es raro que los pacientes presenten deposiciones frecuentes o blandas después de las comidas. [place] generalmente se corrige solo después de unos días o algunas semanas. Si esto ocurre, no te preocupes; esto se resolverá por sí solo.   El estreñimiento es mucho más común que las heces blandas. La causa es la combinación de analgésicos y disminución del nivel de actividad y posiblemente la naturaleza del procedimiento quirúrgico realizado. Si ross que esto está ocurriendo, puede utilizar un tratamiento de venta ruth terell MiraLAX (o Leche de Magnesia, Ex-Lax, Senokot, etc.) hasta que el problema se haya resuelto. Jenny mucha agua y trate de dejar de arcelia analgésicos narcóticos tan pronto terell le resulte cómodo.    6. MEDICAMENTOS PARA EL DOLOR:   Se le dará dustin receta para analgésicos al momento del rosanne. Tómelos según las  indicaciones. Es importante recordar no arcelia medicamentos con el estómago vacío ya que esto puede provocar náuseas.   También puede arcelia acetaminofén y/o JIMMY (ibuprofeno, Aleve, Advil, Motrin) de venta ruth según las instrucciones del paquete.    7. LLAME A LA OF[place] [phone] SI TIENE:  (1) Fiebre de más de 101 °F, (2) Dolor inusual en el pecho o en las piernas, (3) Drenaje o líquido de la incisión que puede tener mal olor, mayor sensibilidad o dolor en la herida o los bordes de la herida ya no están juntos, enrojecimiento o hinchazón en el sitio de la incisión. No dude en llamar si tiene alguna otra pregunta.

## 2023-11-29 NOTE — DISCHARGE PLANNING
Case Management Discharge Planning    Admission Date: 11/27/2023  GMLOS: 2.3  ALOS: 2    6-Clicks ADL Score: 22  6-Clicks Mobility Score: 18      Anticipated Discharge Dispo: Discharge Disposition: Discharged to home/self care (01)    DME Needed: No    Action(s) Taken: Chart review completed. Patient discussed during IDT rounds. Patient s/p ERCP and not tolerating clear liquid diet as this time.      Escalations Completed: None    Medically Clear: No    Next Steps: CM to follow up with IDT regarding discharge planning needs/barriers    Barriers to Discharge: Medical clearance    Is the patient up for discharge tomorrow: No

## 2023-11-29 NOTE — ANESTHESIA PROCEDURE NOTES
Airway    Date/Time: 11/28/2023 4:11 PM    Performed by: Oscar Devine M.D.  Authorized by: Oscar Devine M.D.    Location:  OR  Urgency:  Elective  Indications for Airway Management:  Anesthesia      Spontaneous Ventilation: absent    Sedation Level:  Deep  Preoxygenated: Yes    Patient Position:  Sniffing  Mask Difficulty Assessment:  1 - vent by mask  Final Airway Type:  Endotracheal airway  Final Endotracheal Airway:  ETT  Cuffed: Yes    Technique Used for Successful ETT Placement:  Direct laryngoscopy  Devices/Methods Used in Placement:  Cricoid pressure    Insertion Site:  Oral  Blade Type:  Giovanna  Laryngoscope Blade/Videolaryngoscope Blade Size:  3  ETT Size (mm):  6.5  Measured from:  Teeth  ETT to Teeth (cm):  23  Placement Verified by: auscultation and capnometry    Cormack-Lehane Classification:  Grade I - full view of glottis  Number of Attempts at Approach:  1

## 2023-11-29 NOTE — PROGRESS NOTES
Indication:Bile leak.   Risks, benefits, and alternatives were discussed with with consenting person(s). Consenting person(s) were given an opportunity to ask questions and discuss other options. Risks including but not limited to failed or incomplete ERCP, stent migration, ineffective therapy, radiation exposure, pancreatitis (with potential future complications), contrast reaction, perforation, infection, bleeding, missed lesion(s), cardiac and/or pulmonary event, aspiration, stroke, possible need for surgery, hospitalization possibly prolonged, discomfort, unsuccessful and/or incomplete procedure, possible need for repeat procedures and/or additional testings, damage to adjacent organs and/or vascular structures, medication reaction, disability, death, and other adverse events possibly life-threatening. Discussion was undertaken with Layman's terms. Family was present for translation. Consenting persons stated understanding and acceptance of these risks, and wished to proceed. Consent was given in clear state of mind. Discussed need for stent removal in 3 months. Discussed how stenting works. Discussed higher risk of pancreatitis

## 2023-11-29 NOTE — PROGRESS NOTES
Bedside report received.  Assessment complete.  A&O x 4. Patient calls appropriately.  Patient ambulates with standby assist.    Patient has 5/10 pain. Pain managed with prescribed medications.  Denies N&V. Minimally tolerating diet.  Previous surgical lap stabs are well approximated with dermabond and NAYA.  + void, + flatus, + BM.  Patient denies SOB.  Patient pleasant with staff and sitting up in bed.  Review plan with of care with patient. Call light and personal belongings within reach. Hourly rounding in place. All needs met at this time.

## 2023-11-30 ENCOUNTER — PHARMACY VISIT (OUTPATIENT)
Dept: PHARMACY | Facility: MEDICAL CENTER | Age: 38
End: 2023-11-30
Payer: COMMERCIAL

## 2023-11-30 VITALS
HEART RATE: 83 BPM | DIASTOLIC BLOOD PRESSURE: 65 MMHG | RESPIRATION RATE: 18 BRPM | HEIGHT: 62 IN | OXYGEN SATURATION: 95 % | SYSTOLIC BLOOD PRESSURE: 105 MMHG | WEIGHT: 146.61 LBS | TEMPERATURE: 97.7 F | BODY MASS INDEX: 26.98 KG/M2

## 2023-11-30 LAB
ALBUMIN SERPL BCP-MCNC: 2.9 G/DL (ref 3.2–4.9)
ALBUMIN/GLOB SERPL: 1 G/DL
ALP SERPL-CCNC: 113 U/L (ref 30–99)
ALT SERPL-CCNC: 182 U/L (ref 2–50)
ANION GAP SERPL CALC-SCNC: 9 MMOL/L (ref 7–16)
AST SERPL-CCNC: 23 U/L (ref 12–45)
BASOPHILS # BLD AUTO: 0.6 % (ref 0–1.8)
BASOPHILS # BLD: 0.04 K/UL (ref 0–0.12)
BILIRUB SERPL-MCNC: 0.5 MG/DL (ref 0.1–1.5)
BUN SERPL-MCNC: 6 MG/DL (ref 8–22)
CALCIUM ALBUM COR SERPL-MCNC: 9 MG/DL (ref 8.5–10.5)
CALCIUM SERPL-MCNC: 8.1 MG/DL (ref 8.5–10.5)
CHLORIDE SERPL-SCNC: 108 MMOL/L (ref 96–112)
CO2 SERPL-SCNC: 23 MMOL/L (ref 20–33)
CREAT SERPL-MCNC: 0.7 MG/DL (ref 0.5–1.4)
EOSINOPHIL # BLD AUTO: 0.17 K/UL (ref 0–0.51)
EOSINOPHIL NFR BLD: 2.7 % (ref 0–6.9)
ERYTHROCYTE [DISTWIDTH] IN BLOOD BY AUTOMATED COUNT: 41.4 FL (ref 35.9–50)
GFR SERPLBLD CREATININE-BSD FMLA CKD-EPI: 114 ML/MIN/1.73 M 2
GLOBULIN SER CALC-MCNC: 2.8 G/DL (ref 1.9–3.5)
GLUCOSE SERPL-MCNC: 87 MG/DL (ref 65–99)
HCT VFR BLD AUTO: 30.2 % (ref 37–47)
HGB BLD-MCNC: 10.2 G/DL (ref 12–16)
IMM GRANULOCYTES # BLD AUTO: 0.02 K/UL (ref 0–0.11)
IMM GRANULOCYTES NFR BLD AUTO: 0.3 % (ref 0–0.9)
LYMPHOCYTES # BLD AUTO: 1.72 K/UL (ref 1–4.8)
LYMPHOCYTES NFR BLD: 27.6 % (ref 22–41)
MAGNESIUM SERPL-MCNC: 2 MG/DL (ref 1.5–2.5)
MCH RBC QN AUTO: 32.3 PG (ref 27–33)
MCHC RBC AUTO-ENTMCNC: 33.8 G/DL (ref 32.2–35.5)
MCV RBC AUTO: 95.6 FL (ref 81.4–97.8)
MONOCYTES # BLD AUTO: 0.53 K/UL (ref 0–0.85)
MONOCYTES NFR BLD AUTO: 8.5 % (ref 0–13.4)
NEUTROPHILS # BLD AUTO: 3.75 K/UL (ref 1.82–7.42)
NEUTROPHILS NFR BLD: 60.3 % (ref 44–72)
NRBC # BLD AUTO: 0 K/UL
NRBC BLD-RTO: 0 /100 WBC (ref 0–0.2)
PHOSPHATE SERPL-MCNC: 3.1 MG/DL (ref 2.5–4.5)
PLATELET # BLD AUTO: 228 K/UL (ref 164–446)
PMV BLD AUTO: 9.8 FL (ref 9–12.9)
POTASSIUM SERPL-SCNC: 3.5 MMOL/L (ref 3.6–5.5)
PROT SERPL-MCNC: 5.7 G/DL (ref 6–8.2)
RBC # BLD AUTO: 3.16 M/UL (ref 4.2–5.4)
SODIUM SERPL-SCNC: 140 MMOL/L (ref 135–145)
WBC # BLD AUTO: 6.2 K/UL (ref 4.8–10.8)

## 2023-11-30 PROCEDURE — 84100 ASSAY OF PHOSPHORUS: CPT

## 2023-11-30 PROCEDURE — 83735 ASSAY OF MAGNESIUM: CPT

## 2023-11-30 PROCEDURE — 80053 COMPREHEN METABOLIC PANEL: CPT

## 2023-11-30 PROCEDURE — A9270 NON-COVERED ITEM OR SERVICE: HCPCS | Performed by: PHYSICIAN ASSISTANT

## 2023-11-30 PROCEDURE — 85025 COMPLETE CBC W/AUTO DIFF WBC: CPT

## 2023-11-30 PROCEDURE — 700105 HCHG RX REV CODE 258: Performed by: SURGERY

## 2023-11-30 PROCEDURE — 700111 HCHG RX REV CODE 636 W/ 250 OVERRIDE (IP): Mod: JZ | Performed by: SURGERY

## 2023-11-30 PROCEDURE — 700102 HCHG RX REV CODE 250 W/ 637 OVERRIDE(OP): Performed by: PHYSICIAN ASSISTANT

## 2023-11-30 RX ORDER — OXYCODONE HYDROCHLORIDE 5 MG/1
2.5-5 TABLET ORAL EVERY 6 HOURS PRN
Qty: 10 TABLET | Refills: 0 | Status: SHIPPED | OUTPATIENT
Start: 2023-11-30 | End: 2023-12-05

## 2023-11-30 RX ORDER — ONDANSETRON 4 MG/1
4 TABLET, ORALLY DISINTEGRATING ORAL EVERY 8 HOURS PRN
Qty: 9 TABLET | Refills: 0 | Status: SHIPPED | OUTPATIENT
Start: 2023-11-30 | End: 2023-12-06

## 2023-11-30 RX ADMIN — PIPERACILLIN AND TAZOBACTAM 3.38 G: 3; .375 INJECTION, POWDER, FOR SOLUTION INTRAVENOUS at 04:29

## 2023-11-30 RX ADMIN — FAMOTIDINE 20 MG: 20 TABLET ORAL at 04:30

## 2023-11-30 RX ADMIN — OXYCODONE 5 MG: 5 TABLET ORAL at 06:48

## 2023-11-30 ASSESSMENT — ENCOUNTER SYMPTOMS
FEVER: 0
SHORTNESS OF BREATH: 0
ABDOMINAL PAIN: 1
VOMITING: 0
NAUSEA: 0
COUGH: 0
CHILLS: 0

## 2023-11-30 ASSESSMENT — PAIN DESCRIPTION - PAIN TYPE
TYPE: ACUTE PAIN
TYPE: ACUTE PAIN

## 2023-11-30 NOTE — PROGRESS NOTES
Received report from previous shift RN. Assumed care of patient at 1900.  Assessment complete.  Patient A&O x 4. Patient calls appropriately.  Patient ambulates with SB assist with FWW.   Patient has 5/10 pain. Pain managed with prescribed medications.  Denies N&V. Tolerating regular diet with mild pain/acid reflux.   X4 abdominal lap sites with dermabond, CDI.   + void, + flatus, +11/29 LBM.     Reviewed plan of care with patient. Call light and personal belongings within reach. Hourly rounding in place. All needs met at this time.

## 2023-11-30 NOTE — PROGRESS NOTES
Discharge orders acknowledged, Pt aware and compliant with new orders, D/C teaching completed, Pt able to verbalize needs and complaint with discharge orders. Personal belongings in pt possession. Medication delivered to pt priort to discharge. PIV removed.  Pt escorted off unit via wheelchair with assistance from CNA.

## 2023-11-30 NOTE — CARE PLAN
The patient is Stable - Low risk of patient condition declining or worsening    Shift Goals  Clinical Goals: pain control, tolerate diet  Patient Goals: pain control, D/C  Family Goals: keep updated    Progress made toward(s) clinical / shift goals:    Problem: Pain - Standard  Goal: Alleviation of pain or a reduction in pain to the patient’s comfort goal  Description: Target End Date:  Prior to discharge or change in level of care    Document on Vitals flowsheet    1.  Document pain using the appropriate pain scale per order or unit policy  2.  Educate and implement non-pharmacologic comfort measures (i.e. relaxation, distraction, massage, cold/heat therapy, etc.)  3.  Pain management medications as ordered  4.  Reassess pain after pain med administration per policy  5.  If opiods administered assess patient's response to pain medication is appropriate per POSS sedation scale  6.  Follow pain management plan developed in collaboration with patient and interdisciplinary team (including palliative care or pain specialists if applicable)  Outcome: Progressing     Problem: Knowledge Deficit - Standard  Goal: Patient and family/care givers will demonstrate understanding of plan of care, disease process/condition, diagnostic tests and medications  Description: Target End Date:  1-3 days or as soon as patient condition allows    Document in Patient Education    1.  Patient and family/caregiver oriented to unit, equipment, visitation policy and means for communicating concern  2.  Complete/review Learning Assessment  3.  Assess knowledge level of disease process/condition, treatment plan, diagnostic tests and medications  4.  Explain disease process/condition, treatment plan, diagnostic tests and medications  Outcome: Progressing     Problem: Nutrition  Goal: Patient's nutritional and fluid intake will be adequate or improve  Description: Target End Date:  Prior to discharge or change in level of care    Document on I/O  flowsheet    1.  Monitor nutritional intake  2.  Monitor weight per provider order  3.  Assess patient's ability to take oral nutrition  4.  Collaborate with Speech Therapy, Dietitian and interdisciplinary team for appropriate feeding and fluid intake  5.  Assist with feeding  Outcome: Progressing     Problem: Bowel Elimination  Goal: Establish and maintain regular bowel function  Description: Target End Date:  Prior to discharge or change in level of care    1.   Note date of last BM  2.   Educate about diet, fluid intake, medication and activity to promote bowel function  3.   Educate signs and symptoms of constipation and interventions to implement  4.   Pharmacologic bowel management per provider order  5.   Regular toileting schedule  6.   Upright position for toileting  7.   High fiber diet  8.   Encourage hydration  9.   Collaborate with Clinical Dietician  10. Care and maintenance of ostomy if applicable  Outcome: Progressing     Problem: Gastrointestinal Irritability  Goal: Nausea and vomiting will be absent or improve  Description: Target End Date:  Prior to discharge or change in level of care    Document on I/O and Assessment flowsheets    1.  Assess for history, duration, frequency, severity, and potential precipitating factors  2.  Administer antiemetics as ordered  3.  Emesis basin within easy reach of the patient, but out of sight if psychogenic component  4.  Eliminate strong odors from surroundings  5.  Introduce cold water, ice chips, jessa products, and room temperature broth or bouillon if tolerated and appropriate to the patient's diet  6.  Encourage small amounts of bland, simple foods like broth, rice, bananas, Jell-O, crackers or toast if tolerated and appropriate to patient's diet  7.  Position the patient upright while eating and for 1 to 2 hours post-meal  8.  Encourage nonpharmacological nausea control techniques such as relaxation, guided imagery, music therapy, distraction, or deep  breathing exercises  Outcome: Progressing     Problem: Mobility  Goal: Patient's capacity to carry out activities will improve  Description: Target End Date:  Prior to discharge or change in level of care    1.  Assess for barriers to mobility/activity  2.  Implement activity per interdisciplinary team recommendations  3.  Target activity level identified and patient/family/caregiver aware of goal  4.  Provide assistive devices  5.  Instruct patient/caregiver on proper use of assistive/adaptive devices  6.  Schedule activities and rest periods to decrease effects of fatigue  7.  Encourage mobilization to extent of ability  8.  Maintain proper body alignment  9.  Provide adequate pain management to allow progressive mobilization  10. Implement pace maker precautions as needed  Outcome: Progressing       Patient is not progressing towards the following goals:

## 2023-11-30 NOTE — PROGRESS NOTES
Pt being dc'd to home with no needs. Pt discharge medications sent to [facility]. IV dc'd. Dc instructions to be discussed with patient in discharge lounge. All questions answered.  Patient and family agreeable to dc plan. patient has all belongings at dc.

## 2023-11-30 NOTE — PROGRESS NOTES
Received report from Sylvia ROLDAN RN. Assumed care at 0700  Patient a & o x 4. Romanian speaking.     Voiding without difficulty  BM on 11/29. Abd soft,  Bowel sounds active, pt denies nausea, pt states passing flatus  Pt tolerating a regular diet  Pt states pain to upper abd that started last night.  Lap sites NAYA with no drainage.rain  Patient ambulating with sb assistance and walker. Gait steady.   Patient oriented to room, surroundings and call bell. Bed alarm off. Bed in lowest position, locked and upper side rails up. Patient demonstrated correct use of call bell. Call bell/belongings within reach. Patient educated on hourly rounding.   IV abx as ordered.

## 2023-11-30 NOTE — PROGRESS NOTES
"    DATE: 11/30/2023    Hospital Day 4  admitted with intra-abdominal fluid collection status post laparoscopic cholecystectomy .    INTERVAL EVENTS:  No acute overnight events.  Tolerating diet, passing flatus.  Some cramping in the epigastrium that is intermittent.    REVIEW OF SYSTEMS:  Review of Systems   Constitutional:  Negative for chills and fever.   Respiratory:  Negative for cough and shortness of breath.    Gastrointestinal:  Positive for abdominal pain. Negative for nausea and vomiting.   Genitourinary:         Voiding       PHYSICAL EXAMINATION:  Vital Signs: /65   Pulse 83   Temp 36.5 °C (97.7 °F) (Temporal)   Resp 18   Ht 1.575 m (5' 2\")   Wt 66.5 kg (146 lb 9.7 oz)   SpO2 95%   Physical Exam  Vitals and nursing note reviewed.   Constitutional:       General: She is awake. She is not in acute distress.     Appearance: Normal appearance. She is not ill-appearing.   Abdominal:      General: There is no distension.      Palpations: Abdomen is soft.      Tenderness: There is generalized abdominal tenderness.      Comments: Laparoscopic incisions CDI with Dermabond   Neurological:      Mental Status: She is alert.   Psychiatric:         Behavior: Behavior is cooperative.         LABORATORY VALUES:   Recent Labs     11/28/23 0449 11/29/23 0512 11/30/23  0625   WBC 11.2* 6.6 6.2   RBC 3.70* 3.10* 3.16*   HEMOGLOBIN 12.3 10.2* 10.2*   HEMATOCRIT 34.3* 28.7* 30.2*   MCV 92.7 92.6 95.6   MCH 33.2* 32.9 32.3   MCHC 35.9* 35.5 33.8   RDW 41.9 40.2 41.4   PLATELETCT 210 189 228   MPV 9.9 10.2 9.8       Recent Labs     11/28/23 0449 11/29/23 0512 11/30/23  0625   SODIUM 138 139 140   POTASSIUM 3.9 3.6 3.5*   CHLORIDE 106 108 108   CO2 23 23 23   GLUCOSE 102* 94 87   BUN 6* 6* 6*   CREATININE 0.61 0.49* 0.70   CALCIUM 8.1* 8.1* 8.1*       Recent Labs     11/28/23 0449 11/29/23 0512 11/30/23  0625   ASTSGOT 63* 43 23   ALTSGPT 405* 260* 182*   TBILIRUBIN 1.0 0.6 0.5   ALKPHOSPHAT 124* 112* 113* "   GLOBULIN 2.5 2.6 2.8           Radiology images reviewed, Labs reviewed and Medications reviewed  Atkins catheter: No Atkins      DVT Prophylaxis: Enoxaparin (Lovenox)  DVT prophylaxis - mechanical: SCDs  Ulcer prophylaxis: Yes  Antibiotics: Treating active infection/contamination beyond 24 hours perioperative coverage  Assessed for rehab: Patient returned to prior level of function, rehabilitation not indicated at this time      ASSESSMENT AND PLAN:   * Abdominal pain- (present on admission)  Assessment & Plan  SP lap darrion on 11/25  Presents with abd pain  LFTs mildly elevated  CT shows fluid in GB fossa  HIDA with bile leak.  11/28 ERCP sphincterotomy balloon sweep and placement of 10 English by 7 cm plastic biliary stent.      - Discharge home today  - Follow-up ACS clinic 1 week  - Follow-up with GI for stent removal as instructed  - Rx sent to [facility]      Discussed patient condition with Family, RN, Patient, and trauma surgery. Dr. Bowers

## 2023-12-12 ENCOUNTER — OFFICE VISIT (OUTPATIENT)
Dept: SURGERY | Facility: MEDICAL CENTER | Age: 38
End: 2023-12-12
Attending: STUDENT IN AN ORGANIZED HEALTH CARE EDUCATION/TRAINING PROGRAM
Payer: COMMERCIAL

## 2023-12-12 VITALS
BODY MASS INDEX: 25.16 KG/M2 | RESPIRATION RATE: 16 BRPM | HEIGHT: 63 IN | DIASTOLIC BLOOD PRESSURE: 66 MMHG | SYSTOLIC BLOOD PRESSURE: 130 MMHG | HEART RATE: 76 BPM | OXYGEN SATURATION: 99 % | WEIGHT: 142 LBS

## 2023-12-12 DIAGNOSIS — Z90.49 S/P CHOLECYSTECTOMY: ICD-10-CM

## 2023-12-12 PROCEDURE — 99024 POSTOP FOLLOW-UP VISIT: CPT | Performed by: NURSE PRACTITIONER

## 2023-12-12 PROCEDURE — 3078F DIAST BP <80 MM HG: CPT | Performed by: NURSE PRACTITIONER

## 2023-12-12 PROCEDURE — 3075F SYST BP GE 130 - 139MM HG: CPT | Performed by: NURSE PRACTITIONER

## 2023-12-12 ASSESSMENT — FIBROSIS 4 INDEX: FIB4 SCORE: 0.28

## 2023-12-12 NOTE — PROGRESS NOTES
"      HISTORY OF PRESENT ILLNESS: The patient is a 38 year-old woman who returns to the [facility] for routine follow-up after undergoing a laparoscopic cholecystectomy on 11/25/2023.  She returned to the hospital on 11/27 complaining of right subcostal abdominal pain and was found to have a bile leak.  She was evaluated by GI and taken for an ERCP with sphincterotomy and stent placement.    She has been doing well, eating well.  Does complain of intermittent heartburn not related to diet.  Having normal bowel movements.      CURRENT MEDICATIONS:  Current Outpatient Medications   Medication Sig    acetaminophen (TYLENOL) 500 MG Tab Take 2 Tablets by mouth every 6 hours.    cimetidine (TAGAMET) 300 MG Tab Take 300 mg by mouth every evening.    therapeutic multivitamin-minerals (THERAGRAN-M) Tab Take 1 Tablet by mouth every day.    ibuprofen (MOTRIN) 800 MG Tab Take 1 Tablet by mouth every 8 hours as needed for Mild Pain, Moderate Pain or Inflammation.    omeprazole (PRILOSEC) 20 MG delayed-release capsule Take 20 mg by mouth every day.    IBSRELA 50 MG Tab Take 50 mg by mouth 2 times a day.       PHYSICAL EXAMINATION:  Vital Signs: Blood Pressure 130/66   Pulse 76   Respiration 16   Height 1.6 m (5' 3\")   Weight 64.4 kg (142 lb)   Oxygen Saturation 99%   Physical Exam  Vitals reviewed. Chaperone present: significant other at bedside.   Constitutional:       General: She is not in acute distress.     Appearance: She is not toxic-appearing.   Cardiovascular:      Rate and Rhythm: Normal rate.      Pulses: Normal pulses.   Pulmonary:      Effort: Pulmonary effort is normal.   Abdominal:      General: There is no distension.      Palpations: Abdomen is soft.      Tenderness: There is no abdominal tenderness.      Comments: Port site incisions approximated with surgical glue   Skin:     General: Skin is warm and dry.   Neurological:      Mental Status: She is alert and oriented to person, place, " "and time.         LABORATORY VALUES:  Lab Results   Component Value Date/Time    WBC 6.2 11/30/2023 06:25 AM    RBC 3.16 (L) 11/30/2023 06:25 AM    HEMOGLOBIN 10.2 (L) 11/30/2023 06:25 AM    HEMATOCRIT 30.2 (L) 11/30/2023 06:25 AM    MCV 95.6 11/30/2023 06:25 AM    MCH 32.3 11/30/2023 06:25 AM    MCHC 33.8 11/30/2023 06:25 AM    MPV 9.8 11/30/2023 06:25 AM    NEUTSPOLYS 60.30 11/30/2023 06:25 AM    LYMPHOCYTES 27.60 11/30/2023 06:25 AM    MONOCYTES 8.50 11/30/2023 06:25 AM    EOSINOPHILS 2.70 11/30/2023 06:25 AM    BASOPHILS 0.60 11/30/2023 06:25 AM     Lab Results   Component Value Date/Time    SODIUM 140 11/30/2023 06:25 AM    POTASSIUM 3.5 (L) 11/30/2023 06:25 AM    CHLORIDE 108 11/30/2023 06:25 AM    CO2 23 11/30/2023 06:25 AM    GLUCOSE 87 11/30/2023 06:25 AM    BUN 6 (L) 11/30/2023 06:25 AM    CREATININE 0.70 11/30/2023 06:25 AM     No results found for: \"PROTHROMBTM\", \"INR\"    IMAGING:  No orders to display       PATHOLOGY: Final pathology demonstrated chronic cholecystitis with cholelithiasis.    ASSESSMENT AND PLAN:  1. S/P cholecystectomy  Follow up with GI regarding stent removal  Final postoperative instructions provided. Lifting restrictions reviewed. Discharge from the [facility].       ____________________________________     ONDINA Garcia.      "

## 2023-12-20 ENCOUNTER — GYNECOLOGY VISIT (OUTPATIENT)
Dept: OBGYN | Facility: CLINIC | Age: 38
End: 2023-12-20
Payer: COMMERCIAL

## 2023-12-20 VITALS
SYSTOLIC BLOOD PRESSURE: 106 MMHG | BODY MASS INDEX: 26.17 KG/M2 | HEIGHT: 62 IN | WEIGHT: 142.2 LBS | DIASTOLIC BLOOD PRESSURE: 50 MMHG

## 2023-12-20 DIAGNOSIS — D25.1 INTRAMURAL LEIOMYOMA OF UTERUS: ICD-10-CM

## 2023-12-20 PROCEDURE — 99213 OFFICE O/P EST LOW 20 MIN: CPT | Performed by: OBSTETRICS & GYNECOLOGY

## 2023-12-20 PROCEDURE — 3074F SYST BP LT 130 MM HG: CPT | Performed by: OBSTETRICS & GYNECOLOGY

## 2023-12-20 PROCEDURE — 3078F DIAST BP <80 MM HG: CPT | Performed by: OBSTETRICS & GYNECOLOGY

## 2023-12-20 RX ORDER — RELUGOLIX, ESTRADIOL HEMIHYDRATE, AND NORETHINDRONE ACETATE 40; 1; .5 MG/1; MG/1; MG/1
1 TABLET, FILM COATED ORAL DAILY
Qty: 28 TABLET | Refills: 11 | Status: SHIPPED | OUTPATIENT
Start: 2023-12-20

## 2023-12-20 ASSESSMENT — FIBROSIS 4 INDEX: FIB4 SCORE: 0.28

## 2023-12-20 NOTE — PROGRESS NOTES
GYN visit    CC/reason for visit: follow up uterine fibroids    HPI: Poly Soto is a 38 y.o.  with uterine fibroids that we have been monitoring. She is having painful periods but bleeding is less than before. Pathology from [facility] was WNL.     - largest fibroid measured 1.9 cm  3/2023- largest fibroid measured 4.4 cm  10/23- largest fibroid measured 5.6 cm    Patient would like to avoid surgery.  She is curious as to whether or not the fibroids can be removed from the uterus without removing the uterus totally.      ROS:  Reviewed and negative x 10 with pertinent positives listed in HPI above        GYN History:   No h/o abnormal pap, nor history of cone biopsy, LEEP or any other cervical, uterine or gynecologic surgery. No history of sexually transmitted diseases.       OB history:    OB History    Para Term  AB Living   1 1 0 1   1   SAB IAB Ectopic Molar Multiple Live Births             1      # Outcome Date GA Lbr Octavio/2nd Weight Sex Delivery Anes PTL Lv   1  06/05/10 35w0d  4 lb 15 oz F Vag-Spont None Y ATILIO      Birth Comments: BABY STILL IN NICU.       Past Medical History:   Diagnosis Date    Cholecystitis        Past Surgical History:   Procedure Laterality Date    AR ERCP,DIAGNOSTIC N/A 2023    Procedure: ERCP (ENDOSCOPIC RETROGRADE CHOLANGIOPANCREATOGRAPHY);  Surgeon: Papi Davis M.D.;  Location: SURGERY SAME DAY [facility];  Service: Gastroenterology    SPHINCTEROTOMY N/A 2023    Procedure: SPHINCTEROTOMY;  Surgeon: Papi Davis M.D.;  Location: SURGERY SAME DAY [facility];  Service: Gastroenterology    BILIARY STENT PLACEMENT  2023    Procedure: INSERTION, STENT, BILE DUCT;  Surgeon: Papi Davis M.D.;  Location: SURGERY SAME DAY [facility];  Service: Gastroenterology    NESS BY LAPAROSCOPY  2023    Procedure: CHOLECYSTECTOMY, LAPAROSCOPIC;  Surgeon: Chinedu Campos M.D.;  Location: SURGERY [facility];  Service: General       Medications:   Current  "Outpatient Medications Ordered in Epic   Medication Sig Dispense Refill    Relugolix-Estradiol-Norethind (MYFEMBREE) 40-1-0.5 MG Tab Take 1 Tablet by mouth every day. 28 Tablet 11    omeprazole (PRILOSEC) 20 MG delayed-release capsule Take 20 mg by mouth every day.      IBSRELA 50 MG Tab Take 50 mg by mouth 2 times a day.      acetaminophen (TYLENOL) 500 MG Tab Take 2 Tablets by mouth every 6 hours. (Patient not taking: Reported on 2023) 30 Tablet 0    cimetidine (TAGAMET) 300 MG Tab Take 300 mg by mouth every evening. (Patient not taking: Reported on 2023)      therapeutic multivitamin-minerals (THERAGRAN-M) Tab Take 1 Tablet by mouth every day. (Patient not taking: Reported on 2023)      ibuprofen (MOTRIN) 800 MG Tab Take 1 Tablet by mouth every 8 hours as needed for Mild Pain, Moderate Pain or Inflammation. (Patient not taking: Reported on 2023) 30 Tablet      No current Epic-ordered facility-administered medications on file.       Allergies: Patient has no known allergies.    Social History     Socioeconomic History    Marital status:    Tobacco Use    Smoking status: Never    Smokeless tobacco: Never   Vaping Use    Vaping Use: Never used   Substance and Sexual Activity    Alcohol use: No    Drug use: No    Sexual activity: Yes     Partners: Male     Birth control/protection: None       Family History   Problem Relation Age of Onset    No Known Problems Mother     No Known Problems Father          Physical Exam:  /50 (BP Location: Right arm, Patient Position: Sitting, BP Cuff Size: Large adult)   Ht 5' 2\"   Wt 142 lb 3.2 oz   LMP 2023   BMI 26.01 kg/m²   gen: AAO, NAD, affect appropriate  CV: No edema, cyanosis, or clubbing  Resp: Symmetric non labored breathing  Skin: warm/dry, no lesions    A/P: 38 y.o.  with   1. Intramural leiomyoma of uterus  Relugolix-Estradiol-Norethind (MYFEMBREE) 40-1-0.5 MG Tab    US-PELVIC COMPLETE (TRANSABDOMINAL/TRANSVAGINAL) " (COMBO)          Discussed options of management with patient including medical and surgical.  Medical management would include continued monitoring of fibroid size and medication called Myfembree that can help to stop the growth of fibroids.  We also discussed a hysterectomy as well as a myomectomy.  Discussed that a myomectomy would put patient at greater risk for bleeding than a simple hysterectomy.  Patient would like to avoid surgery if at all possible.  She would prefer medical management.  Fibroids are growing likely secondary to hormonal stimulation. Embx has been normal. Discussed increased risk for blood clot and common side effects including depression and hot flashes. FU 3 months to see how she is doing with Myfimbree and plan to repeat imaging of fibroids in 4 months to assess growth.

## 2024-03-14 ENCOUNTER — PRE-ADMISSION TESTING (OUTPATIENT)
Dept: ADMISSIONS | Facility: MEDICAL CENTER | Age: 39
End: 2024-03-14
Attending: INTERNAL MEDICINE

## 2024-03-14 ENCOUNTER — PRE-ADMISSION TESTING (OUTPATIENT)
Dept: ADMISSIONS | Facility: MEDICAL CENTER | Age: 39
End: 2024-03-14
Attending: INTERNAL MEDICINE
Payer: COMMERCIAL

## 2024-03-14 VITALS — BODY MASS INDEX: 26.87 KG/M2 | HEIGHT: 61 IN

## 2024-03-14 RX ORDER — NAPROXEN 500 MG/1
TABLET ORAL
COMMUNITY
Start: 2023-12-22 | End: 2024-03-14

## 2024-03-14 NOTE — PREPROCEDURE INSTRUCTIONS
Pre-admit telephone appointment completed. Reviewed the Preparing for your procedure handout with patient over the phone.  Patient instructed per pharmacy guidelines regarding taking, holding, or contacting provider for instructions on regularly prescribed medications before surgery. Instructed to take the following medications the day of surgery with a sip of water per pharmacy medication guidelines: IF NEEDED-TYLENOL.    Pt states she had labs done 2/2023 at [facility]. No labs @ [facility]. No labs per [facility] per rep, unless she can't see under GI acct. Called GI office and left message for  to please fax any lab results if have.    Pt will need labs tomorrow DOS.

## 2024-03-15 ENCOUNTER — APPOINTMENT (OUTPATIENT)
Dept: RADIOLOGY | Facility: MEDICAL CENTER | Age: 39
End: 2024-03-15
Attending: INTERNAL MEDICINE

## 2024-03-15 ENCOUNTER — HOSPITAL ENCOUNTER (OUTPATIENT)
Facility: MEDICAL CENTER | Age: 39
End: 2024-03-15
Attending: INTERNAL MEDICINE | Admitting: INTERNAL MEDICINE
Payer: COMMERCIAL

## 2024-03-15 ENCOUNTER — ANESTHESIA (OUTPATIENT)
Dept: SURGERY | Facility: MEDICAL CENTER | Age: 39
End: 2024-03-15

## 2024-03-15 ENCOUNTER — ANESTHESIA EVENT (OUTPATIENT)
Dept: SURGERY | Facility: MEDICAL CENTER | Age: 39
End: 2024-03-15

## 2024-03-15 VITALS
BODY MASS INDEX: 25.14 KG/M2 | OXYGEN SATURATION: 96 % | HEIGHT: 61 IN | HEART RATE: 79 BPM | SYSTOLIC BLOOD PRESSURE: 96 MMHG | TEMPERATURE: 97.5 F | WEIGHT: 133.16 LBS | DIASTOLIC BLOOD PRESSURE: 53 MMHG | RESPIRATION RATE: 16 BRPM

## 2024-03-15 LAB
ALBUMIN SERPL BCP-MCNC: 4.8 G/DL (ref 3.2–4.9)
ALP SERPL-CCNC: 59 U/L (ref 30–99)
ALT SERPL-CCNC: 9 U/L (ref 2–50)
ANION GAP SERPL CALC-SCNC: 11 MMOL/L (ref 7–16)
AST SERPL-CCNC: 15 U/L (ref 12–45)
BILIRUB CONJ SERPL-MCNC: <0.2 MG/DL (ref 0.1–0.5)
BILIRUB INDIRECT SERPL-MCNC: NORMAL MG/DL (ref 0–1)
BILIRUB SERPL-MCNC: 0.4 MG/DL (ref 0.1–1.5)
BUN SERPL-MCNC: 11 MG/DL (ref 8–22)
CALCIUM SERPL-MCNC: 9.4 MG/DL (ref 8.4–10.2)
CHLORIDE SERPL-SCNC: 105 MMOL/L (ref 96–112)
CO2 SERPL-SCNC: 23 MMOL/L (ref 20–33)
CREAT SERPL-MCNC: 0.67 MG/DL (ref 0.5–1.4)
ERYTHROCYTE [DISTWIDTH] IN BLOOD BY AUTOMATED COUNT: 39.7 FL (ref 35.9–50)
GFR SERPLBLD CREATININE-BSD FMLA CKD-EPI: 115 ML/MIN/1.73 M 2
GLUCOSE SERPL-MCNC: 93 MG/DL (ref 65–99)
HCG UR QL: NEGATIVE
HCT VFR BLD AUTO: 42.6 % (ref 37–47)
HGB BLD-MCNC: 15.2 G/DL (ref 12–16)
MCH RBC QN AUTO: 32.8 PG (ref 27–33)
MCHC RBC AUTO-ENTMCNC: 35.7 G/DL (ref 32.2–35.5)
MCV RBC AUTO: 91.8 FL (ref 81.4–97.8)
PLATELET # BLD AUTO: 255 K/UL (ref 164–446)
PMV BLD AUTO: 9.6 FL (ref 9–12.9)
POTASSIUM SERPL-SCNC: 4 MMOL/L (ref 3.6–5.5)
PROT SERPL-MCNC: 7.9 G/DL (ref 6–8.2)
RBC # BLD AUTO: 4.64 M/UL (ref 4.2–5.4)
SODIUM SERPL-SCNC: 139 MMOL/L (ref 135–145)
WBC # BLD AUTO: 7 K/UL (ref 4.8–10.8)

## 2024-03-15 PROCEDURE — 36415 COLL VENOUS BLD VENIPUNCTURE: CPT

## 2024-03-15 PROCEDURE — 80048 BASIC METABOLIC PNL TOTAL CA: CPT

## 2024-03-15 PROCEDURE — 160208 HCHG ENDO MINUTES - EA ADDL 1 MIN LEVEL 4: Performed by: INTERNAL MEDICINE

## 2024-03-15 PROCEDURE — 700105 HCHG RX REV CODE 258: Performed by: INTERNAL MEDICINE

## 2024-03-15 PROCEDURE — 81025 URINE PREGNANCY TEST: CPT

## 2024-03-15 PROCEDURE — 160046 HCHG PACU - 1ST 60 MINS PHASE II: Performed by: INTERNAL MEDICINE

## 2024-03-15 PROCEDURE — 160203 HCHG ENDO MINUTES - 1ST 30 MINS LEVEL 4: Performed by: INTERNAL MEDICINE

## 2024-03-15 PROCEDURE — 700111 HCHG RX REV CODE 636 W/ 250 OVERRIDE (IP): Mod: JZ | Performed by: ANESTHESIOLOGY

## 2024-03-15 PROCEDURE — 80076 HEPATIC FUNCTION PANEL: CPT

## 2024-03-15 PROCEDURE — C1748 ENDOSCOPE, SINGLE, UGI: HCPCS | Performed by: INTERNAL MEDICINE

## 2024-03-15 PROCEDURE — 160036 HCHG PACU - EA ADDL 30 MINS PHASE I: Performed by: INTERNAL MEDICINE

## 2024-03-15 PROCEDURE — 110371 HCHG SHELL REV 272: Performed by: INTERNAL MEDICINE

## 2024-03-15 PROCEDURE — 700111 HCHG RX REV CODE 636 W/ 250 OVERRIDE (IP)

## 2024-03-15 PROCEDURE — C1769 GUIDE WIRE: HCPCS | Performed by: INTERNAL MEDICINE

## 2024-03-15 PROCEDURE — 160035 HCHG PACU - 1ST 60 MINS PHASE I: Performed by: INTERNAL MEDICINE

## 2024-03-15 PROCEDURE — 160025 RECOVERY II MINUTES (STATS): Performed by: INTERNAL MEDICINE

## 2024-03-15 PROCEDURE — 160009 HCHG ANES TIME/MIN: Performed by: INTERNAL MEDICINE

## 2024-03-15 PROCEDURE — 700101 HCHG RX REV CODE 250: Performed by: ANESTHESIOLOGY

## 2024-03-15 PROCEDURE — 85027 COMPLETE CBC AUTOMATED: CPT

## 2024-03-15 PROCEDURE — 160048 HCHG OR STATISTICAL LEVEL 1-5: Performed by: INTERNAL MEDICINE

## 2024-03-15 PROCEDURE — 160002 HCHG RECOVERY MINUTES (STAT): Performed by: INTERNAL MEDICINE

## 2024-03-15 RX ORDER — IPRATROPIUM BROMIDE AND ALBUTEROL SULFATE 2.5; .5 MG/3ML; MG/3ML
3 SOLUTION RESPIRATORY (INHALATION)
Status: DISCONTINUED | OUTPATIENT
Start: 2024-03-15 | End: 2024-03-15 | Stop reason: HOSPADM

## 2024-03-15 RX ORDER — LIDOCAINE HYDROCHLORIDE 10 MG/ML
INJECTION, SOLUTION EPIDURAL; INFILTRATION; INTRACAUDAL; PERINEURAL
Status: COMPLETED
Start: 2024-03-15 | End: 2024-03-15

## 2024-03-15 RX ORDER — KETOROLAC TROMETHAMINE 15 MG/ML
INJECTION, SOLUTION INTRAMUSCULAR; INTRAVENOUS PRN
Status: DISCONTINUED | OUTPATIENT
Start: 2024-03-15 | End: 2024-03-15 | Stop reason: SURG

## 2024-03-15 RX ORDER — SODIUM CHLORIDE, SODIUM LACTATE, POTASSIUM CHLORIDE, CALCIUM CHLORIDE 600; 310; 30; 20 MG/100ML; MG/100ML; MG/100ML; MG/100ML
INJECTION, SOLUTION INTRAVENOUS CONTINUOUS
Status: DISCONTINUED | OUTPATIENT
Start: 2024-03-15 | End: 2024-03-15 | Stop reason: HOSPADM

## 2024-03-15 RX ORDER — ONDANSETRON 2 MG/ML
INJECTION INTRAMUSCULAR; INTRAVENOUS PRN
Status: DISCONTINUED | OUTPATIENT
Start: 2024-03-15 | End: 2024-03-15 | Stop reason: SURG

## 2024-03-15 RX ORDER — OXYCODONE HCL 5 MG/5 ML
10 SOLUTION, ORAL ORAL
Status: DISCONTINUED | OUTPATIENT
Start: 2024-03-15 | End: 2024-03-15 | Stop reason: HOSPADM

## 2024-03-15 RX ORDER — MIDAZOLAM HYDROCHLORIDE 1 MG/ML
1 INJECTION INTRAMUSCULAR; INTRAVENOUS
Status: DISCONTINUED | OUTPATIENT
Start: 2024-03-15 | End: 2024-03-15 | Stop reason: HOSPADM

## 2024-03-15 RX ORDER — ONDANSETRON 2 MG/ML
4 INJECTION INTRAMUSCULAR; INTRAVENOUS
Status: COMPLETED | OUTPATIENT
Start: 2024-03-15 | End: 2024-03-15

## 2024-03-15 RX ORDER — MIDAZOLAM HYDROCHLORIDE 1 MG/ML
INJECTION INTRAMUSCULAR; INTRAVENOUS PRN
Status: DISCONTINUED | OUTPATIENT
Start: 2024-03-15 | End: 2024-03-15 | Stop reason: SURG

## 2024-03-15 RX ORDER — MEPERIDINE HYDROCHLORIDE 25 MG/ML
25 INJECTION INTRAMUSCULAR; INTRAVENOUS; SUBCUTANEOUS
Status: DISCONTINUED | OUTPATIENT
Start: 2024-03-15 | End: 2024-03-15 | Stop reason: HOSPADM

## 2024-03-15 RX ORDER — HYDROMORPHONE HYDROCHLORIDE 1 MG/ML
0.1 INJECTION, SOLUTION INTRAMUSCULAR; INTRAVENOUS; SUBCUTANEOUS
Status: DISCONTINUED | OUTPATIENT
Start: 2024-03-15 | End: 2024-03-15 | Stop reason: HOSPADM

## 2024-03-15 RX ORDER — OXYCODONE HCL 5 MG/5 ML
5 SOLUTION, ORAL ORAL
Status: DISCONTINUED | OUTPATIENT
Start: 2024-03-15 | End: 2024-03-15 | Stop reason: HOSPADM

## 2024-03-15 RX ORDER — DIPHENHYDRAMINE HYDROCHLORIDE 50 MG/ML
12.5 INJECTION INTRAMUSCULAR; INTRAVENOUS
Status: DISCONTINUED | OUTPATIENT
Start: 2024-03-15 | End: 2024-03-15 | Stop reason: HOSPADM

## 2024-03-15 RX ORDER — HYDROMORPHONE HYDROCHLORIDE 1 MG/ML
0.2 INJECTION, SOLUTION INTRAMUSCULAR; INTRAVENOUS; SUBCUTANEOUS
Status: DISCONTINUED | OUTPATIENT
Start: 2024-03-15 | End: 2024-03-15 | Stop reason: HOSPADM

## 2024-03-15 RX ORDER — DEXAMETHASONE SODIUM PHOSPHATE 4 MG/ML
INJECTION, SOLUTION INTRA-ARTICULAR; INTRALESIONAL; INTRAMUSCULAR; INTRAVENOUS; SOFT TISSUE PRN
Status: DISCONTINUED | OUTPATIENT
Start: 2024-03-15 | End: 2024-03-15 | Stop reason: SURG

## 2024-03-15 RX ORDER — LIDOCAINE HYDROCHLORIDE 20 MG/ML
INJECTION, SOLUTION EPIDURAL; INFILTRATION; INTRACAUDAL; PERINEURAL PRN
Status: DISCONTINUED | OUTPATIENT
Start: 2024-03-15 | End: 2024-03-15 | Stop reason: SURG

## 2024-03-15 RX ORDER — HYDROMORPHONE HYDROCHLORIDE 1 MG/ML
0.5 INJECTION, SOLUTION INTRAMUSCULAR; INTRAVENOUS; SUBCUTANEOUS
Status: DISCONTINUED | OUTPATIENT
Start: 2024-03-15 | End: 2024-03-15 | Stop reason: HOSPADM

## 2024-03-15 RX ADMIN — PROPOFOL 150 MG: 10 INJECTION, EMULSION INTRAVENOUS at 09:32

## 2024-03-15 RX ADMIN — DEXAMETHASONE SODIUM PHOSPHATE 8 MG: 4 INJECTION INTRA-ARTICULAR; INTRALESIONAL; INTRAMUSCULAR; INTRAVENOUS; SOFT TISSUE at 09:41

## 2024-03-15 RX ADMIN — LIDOCAINE HYDROCHLORIDE 0.5 ML: 10 INJECTION, SOLUTION EPIDURAL; INFILTRATION; INTRACAUDAL at 07:44

## 2024-03-15 RX ADMIN — FENTANYL CITRATE 100 MCG: 50 INJECTION, SOLUTION INTRAMUSCULAR; INTRAVENOUS at 09:32

## 2024-03-15 RX ADMIN — SUGAMMADEX 200 MG: 100 INJECTION, SOLUTION INTRAVENOUS at 09:48

## 2024-03-15 RX ADMIN — MIDAZOLAM HYDROCHLORIDE 2 MG: 1 INJECTION, SOLUTION INTRAMUSCULAR; INTRAVENOUS at 09:27

## 2024-03-15 RX ADMIN — ONDANSETRON 4 MG: 2 INJECTION INTRAMUSCULAR; INTRAVENOUS at 09:41

## 2024-03-15 RX ADMIN — LIDOCAINE HYDROCHLORIDE 40 MG: 20 INJECTION, SOLUTION EPIDURAL; INFILTRATION; INTRACAUDAL at 09:32

## 2024-03-15 RX ADMIN — KETOROLAC TROMETHAMINE 15 MG: 15 INJECTION, SOLUTION INTRAMUSCULAR; INTRAVENOUS at 09:48

## 2024-03-15 RX ADMIN — Medication 0.5 ML: at 07:44

## 2024-03-15 RX ADMIN — SODIUM CHLORIDE, POTASSIUM CHLORIDE, SODIUM LACTATE AND CALCIUM CHLORIDE: 600; 310; 30; 20 INJECTION, SOLUTION INTRAVENOUS at 09:27

## 2024-03-15 RX ADMIN — ONDANSETRON 4 MG: 2 INJECTION INTRAMUSCULAR; INTRAVENOUS at 10:47

## 2024-03-15 RX ADMIN — ROCURONIUM BROMIDE 50 MG: 10 INJECTION, SOLUTION INTRAVENOUS at 09:32

## 2024-03-15 RX ADMIN — SODIUM CHLORIDE, POTASSIUM CHLORIDE, SODIUM LACTATE AND CALCIUM CHLORIDE: 600; 310; 30; 20 INJECTION, SOLUTION INTRAVENOUS at 07:44

## 2024-03-15 ASSESSMENT — PAIN DESCRIPTION - PAIN TYPE
TYPE: SURGICAL PAIN
TYPE: REFERRED PAIN

## 2024-03-15 ASSESSMENT — FIBROSIS 4 INDEX: FIB4 SCORE: 0.28

## 2024-03-15 ASSESSMENT — PAIN SCALES - GENERAL: PAIN_LEVEL: 4

## 2024-03-15 NOTE — DISCHARGE INSTRUCTIONS
If any questions arise, call your provider.  If your provider is not available, please feel free to call the Surgical Center at [phone].    MEDICATIONS: Resume taking daily medication.  Take prescribed pain medication with food.  If no medication is prescribed, you may take non-aspirin pain medication if needed.  PAIN MEDICATION CAN BE VERY CONSTIPATING.  Take a stool softener or laxative such as senokot, pericolace, or milk of magnesia if needed.    Last pain medication given at ______________    What to Expect Post Anesthesia    Rest and take it easy for the first 24 hours.  A responsible adult is recommended to remain with you during that time.  It is normal to feel sleepy.  We encourage you to not do anything that requires balance, judgment or coordination.    FOR 24 HOURS DO NOT:  Drive, operate machinery or run household appliances.  Drink beer or alcoholic beverages.  Make important decisions or sign legal documents.    To avoid nausea, slowly advance diet as tolerated, avoiding spicy or greasy foods for the first day.  Add more substantial food to your diet according to your provider's instructions. INCREASE FLUIDS AND FIBER TO AVOID CONSTIPATION.    MILD FLU-LIKE SYMPTOMS ARE NORMAL.  YOU MAY EXPERIENCE GENERALIZED MUSCLE ACHES, THROAT IRRITATION, HEADACHE AND/OR SOME NAUSEA.    Diet  Resume your normal diet as tolerated.  A diet low in cholesterol, fat, and sodium is recommended for good health.     ENDOSCOPY HOME CARE INSTRUCTIONS    GASTROSCOPY OR ERCP  1. Don't eat or drink anything for about an hour after the test. You can then resume your regular diet.  2. Don't drive or drink alcohol for 24 hours. The medication you received will make you too drowsy.  3. Don't take any aspirin products until after you see your doctor. These can harm the lining of your stomach.  4. If you begin to vomit bloody material, or develop black or bloody stools, call your doctor as soon as possible.  5. If you have any  neck, chest, abdominal pain or temp of 100 degrees, call your doctor.      Dr. Herzog  [facility]  SANDRINE Brock  [phone]     ERCP with:      Bile duct stent extraction                          Occlusion cholangiogram                          Bile duct stone extraction                          Radiologic interpretation of static and dynamic fluoroscopic images     Indication:        History of postsurgical bile leak with subsequent ERCP now for stent revision     Sedation:         GA     Findings:                          Ampulla                                      Bile duct stent in place - occluded                                      Postsphincterotomy anatomy                          Pancreatic duct                                      Neither injected or cannulated                          CBD                                      Normal course and caliber                                      No bile leak on occlusion cholangiogram                                      Small stone                 Therapeutics                          Bile duct stent extraction with snare                          9-12 mm occlusion cholangiogram                          12 mm balloon sweep productive of small stone     Plan:                Follow liver enzymes      You should call 911 if you develop problems with breathing or chest pain.  If any questions arise, call your doctor. If your doctor is not available, please feel free to call [phone]. You can also call the [facility] HOTLINE open 24 hours/day, 7 days/week and speak to a nurse at [phone], or toll free [phone].

## 2024-03-15 NOTE — ANESTHESIA POSTPROCEDURE EVALUATION
Patient: Poly Soto    Procedure Summary       Date: 03/15/24 Room / Location:  ENDOSCOPIC ULTRASOUND ROOM / SURGERY [facility]    Anesthesia Start: 0927 Anesthesia Stop: 1013    Procedure: ENDOSCOPIC RETROGRADE CHOLANGIOPANCREATOGRAPHY WITH STENT REMOVAL (Mouth) Diagnosis: (HISTORY OF BILE LEAK)    Surgeons: Red Herzog M.D. Responsible Provider: Dex Song M.D.    Anesthesia Type: general ASA Status: 1            Final Anesthesia Type: general  Last vitals  BP   Blood Pressure: 96/58    Temp   36.1 °C (97 °F)    Pulse   74   Resp   18    SpO2   99 %      Anesthesia Post Evaluation    Patient location during evaluation: PACU  Patient participation: complete - patient participated  Level of consciousness: awake and alert  Pain score: 4    Airway patency: patent  Anesthetic complications: no  Cardiovascular status: hemodynamically stable  Respiratory status: acceptable  Hydration status: euvolemic    PONV: none          There were no known notable events for this encounter.     Nurse Pain Score: 5 (NPRS)

## 2024-03-15 NOTE — ANESTHESIA PREPROCEDURE EVALUATION
Case: [id number] Date/Time: 03/15/24 0915    Procedure: ENDOSCOPIC RETROGRADE CHOLANGIOPANCREATOGRAPHY WITH STENT REVISION (Abdomen)    Anesthesia type: General    Pre-op diagnosis: BILE LEAK, POST OPERATIVE    Location:  ENDOSCOPIC ULTRASOUND ROOM / SURGERY [facility]    Surgeons: Red Herzog M.D.            Relevant Problems   No relevant active problems       Physical Exam    Airway   Mallampati: II  TM distance: >3 FB  Neck ROM: full       Cardiovascular - normal exam  Rhythm: regular  Rate: normal  (-) murmur     Dental - normal exam           Pulmonary - normal exam  Breath sounds clear to auscultation     Abdominal    Neurological - normal exam                   Anesthesia Plan    ASA 1       Plan - general       Airway plan will be ETT          Induction: intravenous    Postoperative Plan: Postoperative administration of opioids is intended.    Pertinent diagnostic labs and testing reviewed    Informed Consent:    Anesthetic plan and risks discussed with patient.    Use of blood products discussed with: patient whom consented to blood products.

## 2024-03-15 NOTE — ANESTHESIA TIME REPORT
Anesthesia Start and Stop Event Times       Date Time Event    3/15/2024 0804 Ready for Procedure     0927 Anesthesia Start     1013 Anesthesia Stop          Responsible Staff  03/15/24      Name Role Begin End    Dex Song M.D. Anesth 0927 1013          Overtime Reason:  no overtime (within assigned shift)    Comments:

## 2024-03-15 NOTE — OR NURSING
1118: Patient arrived from PACU via gurney.  No s/s bleeding. Cont to report 4/10 left upper quadrant radiating to left shoulder pain - per report, patient baseline and okay to DC per Dr Nohemi banegas per Ximena FRANK.     Sedation/Resp Status:  Alert.  Respirations spontaneous and non-labored.    1145: Patient education completed with  assist, family denies further questions. DC'd to care of family post uneventful stay in PACU 2.

## 2024-03-15 NOTE — OR NURSING
Pt allergies and NPO status verified, home meds reconcilled. Belongings secured. Pt verbalizes understanding of the pain scale, expected course of stay, and plan of care.  Surgical site verified with pt.  IV access established.  Ipad for  services at bedside.

## 2024-03-15 NOTE — ANESTHESIA PROCEDURE NOTES
Airway    Date/Time: 3/15/2024 9:33 AM    Performed by: Dex Song M.D.  Authorized by: Dex Song M.D.    Location:  OR  Urgency:  Elective  Indications for Airway Management:  Anesthesia      Spontaneous Ventilation: absent    Sedation Level:  Deep  Preoxygenated: Yes    Patient Position:  Sniffing  Final Airway Type:  Endotracheal airway  Final Endotracheal Airway:  ETT  Cuffed: Yes    Technique Used for Successful ETT Placement:  Direct laryngoscopy    Insertion Site:  Oral  Blade Type:  Giovanna  Laryngoscope Blade/Videolaryngoscope Blade Size:  3  ETT Size (mm):  7.0  Measured from:  Teeth  ETT to Teeth (cm):  21  Placement Verified by: auscultation and capnometry    Cormack-Lehane Classification:  Grade I - full view of glottis  Number of Attempts at Approach:  1

## 2024-03-15 NOTE — OR NURSING
1006: To PACU from [facility] via rola, sleeping, respirations spontaneous and non-labored. Stable on 6L O2 via mask.     1015: Pt resting comfortably at this time. Stable on 6L mask.    1030: Pt awake. With use of Thai interpretor tablet, Zakia [id number], pt states pain is tolerable at 5/10. Denies nausea. Pt does c/o dizziness. All questions answered for pt. Glasses in place on pt at this time. Pt stable on RA. Updated pt  via phone.     1045- The patient reports tolerable 4/10 pain. Nausea reported. Provided the patient an emesis bag. Will grab medications    1047- Medicated per MAR for nausea    1100: Pt states pain is tolerable, 4/10. States nausea has improved. Stable on RA.     1105: Pt c/o left sided chest pain with deep breath, 4/10. Pt unable to describe pain, states increased with deep breath.  tablet used for interpreting, Alexsander [id number]. Dr. Song notified, waiting response. Pt remains stable on RA. VSS.     1110:  at bedside to see pt. Pt chest pain 4/10, constant that worsens with deep breath and pressure. Pt declines pain medication at this time.  provided explanation of pain to pt. Pt verbalized understanding. Meets criteria to transfer to Stage 2.

## 2024-03-29 ENCOUNTER — HOSPITAL ENCOUNTER (OUTPATIENT)
Dept: RADIOLOGY | Facility: MEDICAL CENTER | Age: 39
End: 2024-03-29
Attending: FAMILY MEDICINE
Payer: COMMERCIAL

## 2024-03-29 DIAGNOSIS — R10.9 ABDOMINAL PAIN, UNSPECIFIED ABDOMINAL LOCATION: ICD-10-CM

## 2024-03-29 PROCEDURE — 74176 CT ABD & PELVIS W/O CONTRAST: CPT

## 2024-04-05 ENCOUNTER — HOSPITAL ENCOUNTER (OUTPATIENT)
Dept: RADIOLOGY | Facility: MEDICAL CENTER | Age: 39
End: 2024-04-05
Attending: OBSTETRICS & GYNECOLOGY
Payer: COMMERCIAL

## 2024-04-05 DIAGNOSIS — D25.1 INTRAMURAL LEIOMYOMA OF UTERUS: ICD-10-CM

## 2024-04-05 PROCEDURE — 76830 TRANSVAGINAL US NON-OB: CPT

## 2024-06-07 ENCOUNTER — HOSPITAL ENCOUNTER (OUTPATIENT)
Dept: RADIOLOGY | Facility: MEDICAL CENTER | Age: 39
End: 2024-06-07
Attending: FAMILY MEDICINE
Payer: COMMERCIAL

## 2024-06-07 DIAGNOSIS — R10.9 LEFT FLANK PAIN: ICD-10-CM

## 2024-06-10 ENCOUNTER — HOSPITAL ENCOUNTER (OUTPATIENT)
Dept: RADIOLOGY | Facility: MEDICAL CENTER | Age: 39
End: 2024-06-10
Attending: NURSE PRACTITIONER
Payer: COMMERCIAL

## 2024-06-10 DIAGNOSIS — R12 HEARTBURN: ICD-10-CM

## 2024-06-10 DIAGNOSIS — R10.11 RUQ PAIN: ICD-10-CM

## 2024-06-10 DIAGNOSIS — M54.9 BACK PAIN, UNSPECIFIED BACK LOCATION, UNSPECIFIED BACK PAIN LATERALITY, UNSPECIFIED CHRONICITY: ICD-10-CM

## 2024-06-10 PROCEDURE — 74220 X-RAY XM ESOPHAGUS 1CNTRST: CPT

## 2025-01-04 ENCOUNTER — HOSPITAL ENCOUNTER (EMERGENCY)
Facility: MEDICAL CENTER | Age: 40
End: 2025-01-04
Attending: STUDENT IN AN ORGANIZED HEALTH CARE EDUCATION/TRAINING PROGRAM
Payer: COMMERCIAL

## 2025-01-04 ENCOUNTER — APPOINTMENT (OUTPATIENT)
Dept: RADIOLOGY | Facility: MEDICAL CENTER | Age: 40
End: 2025-01-04
Attending: STUDENT IN AN ORGANIZED HEALTH CARE EDUCATION/TRAINING PROGRAM
Payer: COMMERCIAL

## 2025-01-04 VITALS
HEIGHT: 61 IN | TEMPERATURE: 98 F | SYSTOLIC BLOOD PRESSURE: 100 MMHG | HEART RATE: 80 BPM | RESPIRATION RATE: 19 BRPM | DIASTOLIC BLOOD PRESSURE: 57 MMHG | WEIGHT: 135 LBS | OXYGEN SATURATION: 98 % | BODY MASS INDEX: 25.49 KG/M2

## 2025-01-04 DIAGNOSIS — R07.9 CHEST PAIN, UNSPECIFIED TYPE: ICD-10-CM

## 2025-01-04 DIAGNOSIS — R10.13 EPIGASTRIC PAIN: ICD-10-CM

## 2025-01-04 DIAGNOSIS — R42 DIZZINESS: Primary | ICD-10-CM

## 2025-01-04 LAB
ALBUMIN SERPL BCP-MCNC: 4.2 G/DL (ref 3.2–4.9)
ALBUMIN/GLOB SERPL: 1.5 G/DL
ALP SERPL-CCNC: 51 U/L (ref 30–99)
ALT SERPL-CCNC: 13 U/L (ref 2–50)
ANION GAP SERPL CALC-SCNC: 11 MMOL/L (ref 7–16)
AST SERPL-CCNC: 21 U/L (ref 12–45)
BASOPHILS # BLD AUTO: 0.4 % (ref 0–1.8)
BASOPHILS # BLD: 0.04 K/UL (ref 0–0.12)
BILIRUB SERPL-MCNC: 0.3 MG/DL (ref 0.1–1.5)
BUN SERPL-MCNC: 17 MG/DL (ref 8–22)
CALCIUM ALBUM COR SERPL-MCNC: 9.1 MG/DL (ref 8.5–10.5)
CALCIUM SERPL-MCNC: 9.3 MG/DL (ref 8.5–10.5)
CHLORIDE SERPL-SCNC: 105 MMOL/L (ref 96–112)
CO2 SERPL-SCNC: 21 MMOL/L (ref 20–33)
CREAT SERPL-MCNC: 0.99 MG/DL (ref 0.5–1.4)
D DIMER PPP IA.FEU-MCNC: <0.27 UG/ML (FEU) (ref 0–0.5)
EKG IMPRESSION: NORMAL
EOSINOPHIL # BLD AUTO: 0.05 K/UL (ref 0–0.51)
EOSINOPHIL NFR BLD: 0.5 % (ref 0–6.9)
ERYTHROCYTE [DISTWIDTH] IN BLOOD BY AUTOMATED COUNT: 39.8 FL (ref 35.9–50)
GFR SERPLBLD CREATININE-BSD FMLA CKD-EPI: 74 ML/MIN/1.73 M 2
GLOBULIN SER CALC-MCNC: 2.8 G/DL (ref 1.9–3.5)
GLUCOSE SERPL-MCNC: 99 MG/DL (ref 65–99)
HCG SERPL QL: NEGATIVE
HCT VFR BLD AUTO: 38.8 % (ref 37–47)
HGB BLD-MCNC: 13.6 G/DL (ref 12–16)
IMM GRANULOCYTES # BLD AUTO: 0.03 K/UL (ref 0–0.11)
IMM GRANULOCYTES NFR BLD AUTO: 0.3 % (ref 0–0.9)
LIPASE SERPL-CCNC: 40 U/L (ref 11–82)
LYMPHOCYTES # BLD AUTO: 1.43 K/UL (ref 1–4.8)
LYMPHOCYTES NFR BLD: 15.2 % (ref 22–41)
MAGNESIUM SERPL-MCNC: 2 MG/DL (ref 1.5–2.5)
MCH RBC QN AUTO: 33.1 PG (ref 27–33)
MCHC RBC AUTO-ENTMCNC: 35.1 G/DL (ref 32.2–35.5)
MCV RBC AUTO: 94.4 FL (ref 81.4–97.8)
MONOCYTES # BLD AUTO: 0.76 K/UL (ref 0–0.85)
MONOCYTES NFR BLD AUTO: 8.1 % (ref 0–13.4)
NEUTROPHILS # BLD AUTO: 7.1 K/UL (ref 1.82–7.42)
NEUTROPHILS NFR BLD: 75.5 % (ref 44–72)
NRBC # BLD AUTO: 0 K/UL
NRBC BLD-RTO: 0 /100 WBC (ref 0–0.2)
PLATELET # BLD AUTO: 253 K/UL (ref 164–446)
PMV BLD AUTO: 9.5 FL (ref 9–12.9)
POTASSIUM SERPL-SCNC: 4.4 MMOL/L (ref 3.6–5.5)
PROT SERPL-MCNC: 7 G/DL (ref 6–8.2)
RBC # BLD AUTO: 4.11 M/UL (ref 4.2–5.4)
SODIUM SERPL-SCNC: 137 MMOL/L (ref 135–145)
TROPONIN T SERPL-MCNC: <6 NG/L (ref 6–19)
WBC # BLD AUTO: 9.4 K/UL (ref 4.8–10.8)

## 2025-01-04 PROCEDURE — 83690 ASSAY OF LIPASE: CPT

## 2025-01-04 PROCEDURE — 85025 COMPLETE CBC W/AUTO DIFF WBC: CPT

## 2025-01-04 PROCEDURE — 36415 COLL VENOUS BLD VENIPUNCTURE: CPT

## 2025-01-04 PROCEDURE — 83735 ASSAY OF MAGNESIUM: CPT

## 2025-01-04 PROCEDURE — 85379 FIBRIN DEGRADATION QUANT: CPT

## 2025-01-04 PROCEDURE — 93005 ELECTROCARDIOGRAM TRACING: CPT | Mod: TC

## 2025-01-04 PROCEDURE — 700102 HCHG RX REV CODE 250 W/ 637 OVERRIDE(OP): Performed by: STUDENT IN AN ORGANIZED HEALTH CARE EDUCATION/TRAINING PROGRAM

## 2025-01-04 PROCEDURE — 84484 ASSAY OF TROPONIN QUANT: CPT

## 2025-01-04 PROCEDURE — 700117 HCHG RX CONTRAST REV CODE 255: Performed by: STUDENT IN AN ORGANIZED HEALTH CARE EDUCATION/TRAINING PROGRAM

## 2025-01-04 PROCEDURE — 93005 ELECTROCARDIOGRAM TRACING: CPT | Mod: TC | Performed by: STUDENT IN AN ORGANIZED HEALTH CARE EDUCATION/TRAINING PROGRAM

## 2025-01-04 PROCEDURE — 80053 COMPREHEN METABOLIC PANEL: CPT

## 2025-01-04 PROCEDURE — 99285 EMERGENCY DEPT VISIT HI MDM: CPT

## 2025-01-04 PROCEDURE — 700105 HCHG RX REV CODE 258: Performed by: STUDENT IN AN ORGANIZED HEALTH CARE EDUCATION/TRAINING PROGRAM

## 2025-01-04 PROCEDURE — 84703 CHORIONIC GONADOTROPIN ASSAY: CPT

## 2025-01-04 PROCEDURE — 700111 HCHG RX REV CODE 636 W/ 250 OVERRIDE (IP): Mod: JZ | Performed by: STUDENT IN AN ORGANIZED HEALTH CARE EDUCATION/TRAINING PROGRAM

## 2025-01-04 PROCEDURE — 96374 THER/PROPH/DIAG INJ IV PUSH: CPT

## 2025-01-04 PROCEDURE — 71045 X-RAY EXAM CHEST 1 VIEW: CPT

## 2025-01-04 PROCEDURE — A9270 NON-COVERED ITEM OR SERVICE: HCPCS | Performed by: STUDENT IN AN ORGANIZED HEALTH CARE EDUCATION/TRAINING PROGRAM

## 2025-01-04 PROCEDURE — 74177 CT ABD & PELVIS W/CONTRAST: CPT

## 2025-01-04 RX ORDER — SODIUM CHLORIDE, SODIUM LACTATE, POTASSIUM CHLORIDE, CALCIUM CHLORIDE 600; 310; 30; 20 MG/100ML; MG/100ML; MG/100ML; MG/100ML
1000 INJECTION, SOLUTION INTRAVENOUS ONCE
Status: COMPLETED | OUTPATIENT
Start: 2025-01-04 | End: 2025-01-04

## 2025-01-04 RX ORDER — ONDANSETRON 2 MG/ML
4 INJECTION INTRAMUSCULAR; INTRAVENOUS ONCE
Status: COMPLETED | OUTPATIENT
Start: 2025-01-04 | End: 2025-01-04

## 2025-01-04 RX ADMIN — SODIUM CHLORIDE, SODIUM LACTATE, POTASSIUM CHLORIDE, AND CALCIUM CHLORIDE 1000 ML: .6; .31; .03; .02 INJECTION, SOLUTION INTRAVENOUS at 15:14

## 2025-01-04 RX ADMIN — IOHEXOL 80 ML: 350 INJECTION, SOLUTION INTRAVENOUS at 19:30

## 2025-01-04 RX ADMIN — ONDANSETRON 4 MG: 2 INJECTION INTRAMUSCULAR; INTRAVENOUS at 15:15

## 2025-01-04 RX ADMIN — LIDOCAINE HYDROCHLORIDE 30 ML: 20 SOLUTION ORAL; TOPICAL at 15:16

## 2025-01-04 ASSESSMENT — LIFESTYLE VARIABLES
HAVE YOU EVER FELT YOU SHOULD CUT DOWN ON YOUR DRINKING: NO
TOTAL SCORE: 0
DO YOU DRINK ALCOHOL: NO
EVER FELT BAD OR GUILTY ABOUT YOUR DRINKING: NO
EVER HAD A DRINK FIRST THING IN THE MORNING TO STEADY YOUR NERVES TO GET RID OF A HANGOVER: NO
TOTAL SCORE: 0
TOTAL SCORE: 0
CONSUMPTION TOTAL: INCOMPLETE
HAVE PEOPLE ANNOYED YOU BY CRITICIZING YOUR DRINKING: NO

## 2025-01-04 ASSESSMENT — FIBROSIS 4 INDEX: FIB4 SCORE: .7450980392156862745

## 2025-01-04 NOTE — ED TRIAGE NOTES
Pt to triage in WC w/ family.  Chief Complaint   Patient presents with    Syncope    Dizziness     Several episodes today.  Pt aox4. DIAZ. EKG complete.

## 2025-01-04 NOTE — ED PROVIDER NOTES
ED Provider Note    CHIEF COMPLAINT  Chief Complaint   Patient presents with    Syncope    Dizziness       EXTERNAL RECORDS REVIEWED  Outpatient Notes patient was seen by GI consultants and September 16, 2024 for follow-up.  She has history of postsurgical bile leak with subsequent ERCP note for bile duct stent extraction.  There was no bile leak on occlusion cholangiogram.  There was a small stone which was removed and the biliary tree emptied nicely of contrast after removal.    HPI/ROS  LIMITATION TO HISTORY   Select: : None  OUTSIDE HISTORIAN(S):  None    Poly Soto is a 39 y.o. female who presents for evaluation of burning in her chest as well as dizziness that started earlier this morning.  She notes that symptoms started after she took her famotidine.  She has not had any nausea, vomiting, diarrhea.  She has felt dizzy, feels worse when she moves her head.  She is still able to walk.  Triage note states that she has been having syncopal episodes however she has not fully lost consciousness but is just feeling all over a week.  She has history of bile leak requiring ERCP and biliary stent.  She is concerned that maybe this is related to that.  This was done in November 2023.  She denies any fevers, chills, leg pain, leg swelling.  She has no family history of sudden cardiac death.  She has never passed out before.    PAST MEDICAL HISTORY   has a past medical history of Cholecystitis, Gynecological disorder, Heart burn, Indigestion, and Pain (03/14/2024).    SURGICAL HISTORY   has a past surgical history that includes darrion by laparoscopy (11/25/2023); ercp,diagnostic (N/A, 11/28/2023); sphincterotomy (N/A, 11/28/2023); biliary stent placement (11/28/2023); dental extraction(s); endoscopy (2023); and ercp duct stent placement (N/A, 3/15/2024).    FAMILY HISTORY  Family History   Problem Relation Age of Onset    No Known Problems Mother     No Known Problems Father        SOCIAL HISTORY  Social History  "    Tobacco Use    Smoking status: Never    Smokeless tobacco: Never   Vaping Use    Vaping status: Never Used   Substance and Sexual Activity    Alcohol use: Never    Drug use: Never    Sexual activity: Yes     Partners: Male     Birth control/protection: None       CURRENT MEDICATIONS  Home Medications       Reviewed by Nenita Cespedes R.N. (Registered Nurse) on 01/04/25 at 1412  Med List Status: Partial     Medication Last Dose Status   acetaminophen (TYLENOL) 500 MG Tab  Active   IBSRELA 50 MG Tab  Active   Relugolix-Estradiol-Norethind (MYFEMBREE) 40-1-0.5 MG Tab  Active   therapeutic multivitamin-minerals (THERAGRAN-M) Tab  Active                    ALLERGIES  No Known Allergies    PHYSICAL EXAM  VITAL SIGNS: /66   Pulse 87   Temp 36.4 °C (97.5 °F) (Temporal)   Resp 18   Ht 1.549 m (5' 1\")   Wt 61.2 kg (135 lb)   SpO2 99%   BMI 25.51 kg/m²      Constitutional: Well developed, Well nourished, No acute distress, Non-toxic appearance.   HEENT: Normocephalic, Atraumatic,  external ears normal, pharynx pink,  Mucous  Membranes moist, No rhinorrhea or mucosal edema  Eyes: PERRL, EOMI, Conjunctiva normal, No discharge.   Neck: Normal range of motion, No tenderness, Supple, No stridor.    Cardiovascular: Regular Rate and Rhythm, No murmurs,  rubs, or gallops.   Thorax & Lungs: Lungs clear to auscultation bilaterally, No respiratory distress, No wheezes, rhales or rhonchi, No chest wall tenderness.   Abdomen: Soft, non tender, non distended,  No pulsatile masses., no rebound guarding or peritoneal signs.   Skin: Warm, Dry, No erythema, No rash,   Back:  No CVA tenderness,  No spinal tenderness, bony crepitance step offs or instability.   Extremities: Equal, intact distal pulses, No cyanosis, clubbing or edema,  No tenderness.   Musculoskeletal: Good range of motion in all major joints. No tenderness to palpation or major deformities noted.   Neurologic: Alert and oriented, Symmetric smile, eyes shut " tight bilaterally, forehead wrinkles bilaterally, sensation intact to light touch bilateral face, tongue midline, head turn and shoulder shrug with full strength. Hearing intact grossly bilaterally. 5/5 strength shoulder, elbow  b/l. 5/5 strength hip, knee, ankle b/l   SILT biceps, forearms, hands, SILT thighs, shins mid foot   NO pronator drift, no aphasia, no dysarthria, no gaze preference or visual deficit   Psychiatric: Affect normal, Judgment normal, Mood normal.      EKG/LABS  Results for orders placed or performed during the hospital encounter of 25   EKG    Collection Time: 25  2:51 PM   Result Value Ref Range    Report       [facility] Emergency Dept.    Test Date:  2025  Pt Name:    HUSEYIN COLBY          Department: ER  MRN:        [id number]                      Room:  Gender:     Female                       Technician: [id number]  :        1985                   Requested By:ER TRIAGE PROTOCOL  Order #:    [id number]                    Reading MD: Joyce Lyon    Measurements  Intervals                                Axis  Rate:       71                           P:          81  NY:         142                          QRS:        94  QRSD:       92                           T:          38  QT:         424  QTc:        461    Interpretive Statements  Sinus rhythm  Borderline right axis deviation  Borderline T wave abnormalities  Normal intervals  Compared to ECG 2023 04:08:56  No significant changes  Electronically Signed On 2025 14:51:53 PST by Joyce Lyon     CBC WITH DIFFERENTIAL    Collection Time: 25  3:17 PM   Result Value Ref Range    WBC 9.4 4.8 - 10.8 K/uL    RBC 4.11 (L) 4.20 - 5.40 M/uL    Hemoglobin 13.6 12.0 - 16.0 g/dL    Hematocrit 38.8 37.0 - 47.0 %    MCV 94.4 81.4 - 97.8 fL    MCH 33.1 (H) 27.0 - 33.0 pg    MCHC 35.1 32.2 - 35.5 g/dL    RDW 39.8 35.9 - 50.0 fL    Platelet Count 253 164 - 446 K/uL    MPV 9.5 9.0  - 12.9 fL    Neutrophils-Polys 75.50 (H) 44.00 - 72.00 %    Lymphocytes 15.20 (L) 22.00 - 41.00 %    Monocytes 8.10 0.00 - 13.40 %    Eosinophils 0.50 0.00 - 6.90 %    Basophils 0.40 0.00 - 1.80 %    Immature Granulocytes 0.30 0.00 - 0.90 %    Nucleated RBC 0.00 0.00 - 0.20 /100 WBC    Neutrophils (Absolute) 7.10 1.82 - 7.42 K/uL    Lymphs (Absolute) 1.43 1.00 - 4.80 K/uL    Monos (Absolute) 0.76 0.00 - 0.85 K/uL    Eos (Absolute) 0.05 0.00 - 0.51 K/uL    Baso (Absolute) 0.04 0.00 - 0.12 K/uL    Immature Granulocytes (abs) 0.03 0.00 - 0.11 K/uL    NRBC (Absolute) 0.00 K/uL   COMP METABOLIC PANEL    Collection Time: 01/04/25  3:17 PM   Result Value Ref Range    Sodium 137 135 - 145 mmol/L    Potassium 4.4 3.6 - 5.5 mmol/L    Chloride 105 96 - 112 mmol/L    Co2 21 20 - 33 mmol/L    Anion Gap 11.0 7.0 - 16.0    Glucose 99 65 - 99 mg/dL    Bun 17 8 - 22 mg/dL    Creatinine 0.99 0.50 - 1.40 mg/dL    Calcium 9.3 8.5 - 10.5 mg/dL    Correct Calcium 9.1 8.5 - 10.5 mg/dL    AST(SGOT) 21 12 - 45 U/L    ALT(SGPT) 13 2 - 50 U/L    Alkaline Phosphatase 51 30 - 99 U/L    Total Bilirubin 0.3 0.1 - 1.5 mg/dL    Albumin 4.2 3.2 - 4.9 g/dL    Total Protein 7.0 6.0 - 8.2 g/dL    Globulin 2.8 1.9 - 3.5 g/dL    A-G Ratio 1.5 g/dL   LIPASE    Collection Time: 01/04/25  3:17 PM   Result Value Ref Range    Lipase 40 11 - 82 U/L   TROPONIN    Collection Time: 01/04/25  3:17 PM   Result Value Ref Range    Troponin T <6 6 - 19 ng/L   D-DIMER    Collection Time: 01/04/25  3:17 PM   Result Value Ref Range    D-Dimer <0.27 0.00 - 0.50 ug/mL (FEU)   MAGNESIUM    Collection Time: 01/04/25  3:17 PM   Result Value Ref Range    Magnesium 2.0 1.5 - 2.5 mg/dL   ESTIMATED GFR    Collection Time: 01/04/25  3:17 PM   Result Value Ref Range    GFR (CKD-EPI) 74 >60 mL/min/1.73 m 2   HCG QUAL SERUM    Collection Time: 01/04/25  3:17 PM   Result Value Ref Range    Beta-Hcg Qualitative Serum Negative Negative       I have independently interpreted this  EKG    RADIOLOGY/PROCEDURES   I have independently interpreted the diagnostic imaging associated with this visit and am waiting the final reading from the radiologist.   My preliminary interpretation is as follows: no free air in abdomen    Radiologist interpretation:  CT-ABDOMEN-PELVIS WITH   Final Result      1.  No acute abnormalities are identified in the abdomen or pelvis.      2.  Uterus is enlarged and somewhat lobulated likely containing leiomyomas as on prior CT.      DX-CHEST-PORTABLE (1 VIEW)   Final Result      No acute cardiac or pulmonary abnormalities are identified.          COURSE & MEDICAL DECISION MAKING    ASSESSMENT, COURSE AND PLAN  Care Narrative:   This is a 39-year-old female with history of cholecystitis status post cholecystectomy which was complicated by bile leak requiring biliary stent placement in November 2023 and the stent has been removed in 2024 his presenting for evaluation of feeling dizzy as well as burning chest pain that started after she took her famotidine.  On arrival, her vital signs are stable.  She is nontoxic appearance.  Her abdomen is soft nontender.  Physical exam is not consistent with neurologic deficit to raise concern for posterior circulation CVA.  Patient is very concerned that symptoms may be related to biliary leak.  Initially she was denying any abdominal pain but after a workup she was complaining of abdominal pain.    An IV was established and labs were obtained.  There is no leukocytosis.  Her LFTs are within normal limits.  Her bilirubin is normal.  When she had bile leak in November 2023, she had abnormal LFTs.  I also considered cardiac causes of her chest pain.  Troponin is less than 6 and EKG with no acute ischemic changes, doubt ACS.  D-dimer screen was performed and is negative therefore I doubt PE.  CT abdomen pelvis was obtained and shows no concerning findings.    Patient was given IV fluids as well as GI cocktail and she reports feeling better.   Discussed with patient at this time her workup is reassuring against emergent pathology.  I have advised her to follow-up with her outpatient providers or return emergency room for any new or worsening symptoms.  Patient is agreeable to discharge plan with no further questions.      Hydration: Based on the patient's presentation of Other dizziness the patient was given IV fluids. IV Hydration was used because oral hydration was not adequate alone. Upon recheck following hydration, the patient was improved and patient was tolerating PO.          ADDITIONAL PROBLEMS MANAGED  None    DISPOSITION AND DISCUSSIONS  I have discussed management of the patient with the following physicians and ROSSY's:    None    Discussion of management with other QHP or appropriate source(s): None     Escalation of care considered, and ultimately not performed:acute inpatient care management, however at this time, the patient is most appropriate for outpatient management    Barriers to care at this time, including but not limited to:  None .     Decision tools and prescription drugs considered including, but not limited to:  None .     The patient will return for new or worsening symptoms and is stable at the time of discharge.    The patient is referred to a primary physician for blood pressure management, diabetic screening, and for all other preventative health concerns.      DISPOSITION:  Patient will be discharged home in stable condition.    FOLLOW UP:  Puneet Fung M.D.  [address] [place] [id number]  [phone]          [facility], Emergency Dept  [facility] [facility] [address] [phone]          OUTPATIENT MEDICATIONS:  Discharge Medication List as of 1/4/2025  7:43 PM            FINAL DIAGNOSIS  1. Dizziness Acute   2. Epigastric pain Acute   3. Chest pain, unspecified type Acute        Electronically signed by: Joyce Lyon M.D., 1/4/2025 2:51 PM

## 2025-01-05 NOTE — ED NOTES
Pt is for discharge, home instructions advised, follow up instructions and health education imparted. Patient verbalized understanding.  Pt left ED awake and ambulatory on steady gait.

## 2025-01-05 NOTE — DISCHARGE INSTRUCTIONS
You are seen in the emergency room for evaluation of abdominal pain and dizziness.  Your lab test thus far show no abnormalities.  The CT scan of your abdomen shows no complications related to your gallbladder surgery.  Please drink lots of fluids.  Return to emergency room for any new or worsening symptoms.

## 2025-01-05 NOTE — ED NOTES
Bedside report received from off going RN/tech: Alethea RN, assumed care of patient.  POC discussed with patient. Call light within reach, all needs addressed at this time.       Fall risk interventions in place: Patient's personal possessions are with in their safe reach, Place fall risk sign on patient's door, Keep floor surfaces clean and dry, and Accompanied to restroom (all applicable per Gomer Fall risk assessment)   Continuous monitoring: Pulse Ox or Blood Pressure  IVF/IV medications: Not Applicable   Oxygen: Room Air  Bedside sitter: Not Applicable   Isolation: Not Applicable

## 2025-01-20 ENCOUNTER — TELEPHONE (OUTPATIENT)
Dept: CARDIOLOGY | Facility: MEDICAL CENTER | Age: 40
End: 2025-01-20
Payer: COMMERCIAL

## 2025-01-20 NOTE — TELEPHONE ENCOUNTER
Spoke to pt's daughter regarding upcoming appt. With JA. Daughter stated that pt has not been seen by a cardiologist or has completed any cardiac labs or testing outside of [facility].

## 2025-01-22 NOTE — PROGRESS NOTES
Cardiology Consultation Note    Date of note:    1/22/2025    Primary Care Provider: Puneet Fung M.D.  Referring Provider: Puneet Fung M.D.     Patient Name: Poly Adams     YOB: 1985  MRN:              [id number]      Chief Complaint   Patient presents with    Chest Pain     Hospital follow up          Subjective:   Poly Soto is a 39 y.o. female who presents today for initial consultation with her  Francis. She is Telugu speaking and an iPad  was used ([employer]).     Current medical problems include GERD, cholecystitis status postcholecystectomy complicated by bile leak requiring biliary stent placed in November 2023 with stent removed in 2024.    She presented to the emergent department 1/4 with dizziness and chest burning.  She had a workup including a CT abdomen pelvis which showed no concerning findings.  Her troponin and EKG showed no signs of myocardial ischemia. She was given fluids and discharged.     Poly does not recall the events leading to the ER visit but her  was there and is able to tell me that she woke up in her normal state of health. They were sitting eating lunch and she felt dizzy, hot, nauseous. She got up to get fresh air at the front door and she passed out in her husbands arms. He believes she lost consciousness for 30 sec to 1 min then came to. She got up, tried to walk to the bathroom and nearly fainted multiple times. The near fainting occurred when she was sitting as well. She was driven to the ER and recalls feeling better after she was given fluids.     She has not fainted before this incidence and has not fainted afterwards although she continues to feel dizzy which she describes as both a room spinning sensation and lightheadedness. There is not noticeable pattern with this, not necessarily occurring with food, after meals. She has low blood pressure. She eats regular meals, she tries to drink water but this  "often makes her nauseous. She is not dieting, not taking diet supplements.    She endorses chest burning ongoing for the last 2 years and is a patient of Dr. Davis, recently tried to have a pH monitor placed that this was not successful.     She denies other medical problems. No cardiac history. Denies family history of cardiac disease. She does not smoke or drink alcohol or use illicit drugs.       Past Medical History:   Diagnosis Date    Cholecystitis     Gynecological disorder     ovarian cysts    Heart burn     Indigestion     Pain 03/14/2024    bulateral upper quadrant pain.       Social History     Tobacco Use   Smoking Status Never   Smokeless Tobacco Never          Social History     Substance and Sexual Activity   Alcohol Use Never         Family History   Problem Relation Age of Onset    No Known Problems Mother     No Known Problems Father          No Known Allergies      Current Outpatient Medications   Medication Sig Dispense Refill    omeprazole (PRILOSEC) 40 MG delayed-release capsule Take 40 mg by mouth every day.      famotidine (PEPCID) 40 MG Tab Take 40 mg by mouth every day.      therapeutic multivitamin-minerals (THERAGRAN-M) Tab Take 1 Tablet by mouth every day.      Relugolix-Estradiol-Norethind (MYFEMBREE) 40-1-0.5 MG Tab Take 1 Tablet by mouth every day. (Patient not taking: Reported on 1/23/2025) 28 Tablet 11     No current facility-administered medications for this visit.         Review of Systems   Constitutional:  Negative for malaise/fatigue.   Respiratory:  Negative for shortness of breath.    Cardiovascular:  Positive for palpitations. Negative for chest pain, orthopnea, leg swelling and PND.   Gastrointestinal:  Positive for heartburn and nausea. Negative for abdominal pain and vomiting.   Neurological:  Positive for dizziness and loss of consciousness.          Objective:   BP 98/54 (BP Location: Left arm, Patient Position: Sitting)   Pulse 76   Resp 16   Ht 1.549 m (5' 1\")   Wt " 62.1 kg (137 lb)   SpO2 100%   BMI 25.89 kg/m²        Physical Exam  Vitals reviewed.   HENT:      Head: Normocephalic and atraumatic.   Cardiovascular:      Rate and Rhythm: Normal rate and regular rhythm.      Heart sounds: No murmur heard.  Pulmonary:      Effort: Pulmonary effort is normal. No respiratory distress.      Breath sounds: No rales.   Abdominal:      General: There is no distension.   Musculoskeletal:      Right lower leg: No edema.      Left lower leg: No edema.   Skin:     General: Skin is warm and dry.   Neurological:      General: No focal deficit present.      Mental Status: She is alert.      Gait: Gait normal.   Psychiatric:         Judgment: Judgment normal.         Supine HR 74 BP: 100/59  Sitting: HR 81 BP: 93/56  Standing: HR 90 BP: 99/63     Cardiac Imaging and Procedures Review:    EKG 1/4/25 : Sinus rhythm       Assessment:     1. Idiopathic hypotension  CORTISOL - AM    Cardiac Event Monitor    TSH WITH REFLEX TO FT4    EC-ECHOCARDIOGRAM COMPLETE W/O CONT      2. Syncope and collapse  EC-ECHOCARDIOGRAM COMPLETE W/O CONT           Medical Decision Making:  Today's Assessment / Plan:   Syncope  Acid Reflux  Hypotension  - Based on her history and prodrome this is most likely neurocardiogenic syncope elicited by her poorly controlled acid reflux and GI issues. She is not orthostatic in the office though likely chronically under hydrated as water makes her nauseous. I will check a TSH and cortisol AM. She had a recent CBC that showed no anemia. She will also wear a 2 week cardiac event monitor to screen for dangerous cardiac arrhythmias as the cause of there syncope. She has a normal cardiac exam and no murmurs. We'll check an echo to ensure a structurally normal heart and no pericardial effusion although again she is compensated on exam without exam findings of tamponade.     I will see her back after labs, event monitor and echocardiogram.     Brisa Lin PA-C  [facility]  [facility]

## 2025-01-23 ENCOUNTER — OFFICE VISIT (OUTPATIENT)
Dept: CARDIOLOGY | Facility: MEDICAL CENTER | Age: 40
End: 2025-01-23
Attending: PHYSICIAN ASSISTANT
Payer: COMMERCIAL

## 2025-01-23 VITALS
DIASTOLIC BLOOD PRESSURE: 54 MMHG | RESPIRATION RATE: 16 BRPM | HEIGHT: 61 IN | HEART RATE: 76 BPM | OXYGEN SATURATION: 100 % | SYSTOLIC BLOOD PRESSURE: 98 MMHG | WEIGHT: 137 LBS | BODY MASS INDEX: 25.86 KG/M2

## 2025-01-23 DIAGNOSIS — I95.0 IDIOPATHIC HYPOTENSION: ICD-10-CM

## 2025-01-23 DIAGNOSIS — R55 SYNCOPE AND COLLAPSE: ICD-10-CM

## 2025-01-23 PROCEDURE — 99211 OFF/OP EST MAY X REQ PHY/QHP: CPT | Performed by: PHYSICIAN ASSISTANT

## 2025-01-23 PROCEDURE — 3078F DIAST BP <80 MM HG: CPT | Performed by: PHYSICIAN ASSISTANT

## 2025-01-23 PROCEDURE — 3074F SYST BP LT 130 MM HG: CPT | Performed by: PHYSICIAN ASSISTANT

## 2025-01-23 PROCEDURE — 99212 OFFICE O/P EST SF 10 MIN: CPT | Performed by: PHYSICIAN ASSISTANT

## 2025-01-23 PROCEDURE — 99204 OFFICE O/P NEW MOD 45 MIN: CPT | Performed by: PHYSICIAN ASSISTANT

## 2025-01-23 RX ORDER — OMEPRAZOLE 40 MG/1
40 CAPSULE, DELAYED RELEASE ORAL DAILY
COMMUNITY

## 2025-01-23 RX ORDER — FAMOTIDINE 40 MG/1
40 TABLET, FILM COATED ORAL DAILY
COMMUNITY

## 2025-01-23 ASSESSMENT — ENCOUNTER SYMPTOMS
PND: 0
VOMITING: 0
DIZZINESS: 1
ABDOMINAL PAIN: 0
HEARTBURN: 1
PALPITATIONS: 1
NAUSEA: 1
SHORTNESS OF BREATH: 0
LOSS OF CONSCIOUSNESS: 1
ORTHOPNEA: 0

## 2025-01-23 ASSESSMENT — FIBROSIS 4 INDEX: FIB4 SCORE: 0.87

## 2025-01-29 DIAGNOSIS — I95.0 IDIOPATHIC HYPOTENSION: ICD-10-CM

## 2025-01-30 ENCOUNTER — TELEPHONE (OUTPATIENT)
Dept: HEALTH INFORMATION MANAGEMENT | Facility: OTHER | Age: 40
End: 2025-01-30
Payer: COMMERCIAL

## 2025-02-13 ENCOUNTER — NON-PROVIDER VISIT (OUTPATIENT)
Dept: CARDIOLOGY | Facility: MEDICAL CENTER | Age: 40
End: 2025-02-13
Attending: PHYSICIAN ASSISTANT
Payer: COMMERCIAL

## 2025-02-13 DIAGNOSIS — I95.0 IDIOPATHIC HYPOTENSION: ICD-10-CM

## 2025-02-13 NOTE — PROGRESS NOTES
Home enrollment completed in the 14 day Zio Holter monitor per Brisa Lin PA-C.  Monitor will be shipped to patient via [employer].  Pending EOS.

## 2025-02-21 ENCOUNTER — HOSPITAL ENCOUNTER (OUTPATIENT)
Dept: RADIOLOGY | Facility: MEDICAL CENTER | Age: 40
End: 2025-02-21
Attending: FAMILY MEDICINE
Payer: COMMERCIAL

## 2025-02-21 DIAGNOSIS — R22.1 NECK MASS: ICD-10-CM

## 2025-02-21 PROCEDURE — 76536 US EXAM OF HEAD AND NECK: CPT

## 2025-03-05 ENCOUNTER — TELEPHONE (OUTPATIENT)
Dept: CARDIOLOGY | Facility: MEDICAL CENTER | Age: 40
End: 2025-03-05
Payer: COMMERCIAL

## 2025-03-06 NOTE — TELEPHONE ENCOUNTER
Called pt to ask about moving her appt w/ JA to after her Echo if she is comfortable with it. Pt wants to know if we will have her monitor results by 03/07 at her currently scheduled appt. I have reached out to Elias to verify. I will call pt back when I find out and r/s appt farther out if we do not. If we have results, pt wants to be seen on 03/07. /cg 03/05/25

## 2025-03-07 ENCOUNTER — APPOINTMENT (OUTPATIENT)
Dept: CARDIOLOGY | Facility: MEDICAL CENTER | Age: 40
End: 2025-03-07
Attending: PHYSICIAN ASSISTANT
Payer: COMMERCIAL

## 2025-03-17 ENCOUNTER — RESULTS FOLLOW-UP (OUTPATIENT)
Dept: CARDIOLOGY | Facility: MEDICAL CENTER | Age: 40
End: 2025-03-17
Payer: COMMERCIAL

## 2025-03-17 ENCOUNTER — TELEPHONE (OUTPATIENT)
Dept: CARDIOLOGY | Facility: MEDICAL CENTER | Age: 40
End: 2025-03-17
Payer: COMMERCIAL

## 2025-03-17 NOTE — TELEPHONE ENCOUNTER
Brisa Lin P.A.-C.  You2 hours ago (12:52 PM)       There are no urgent findings, I can review the details with her at follow up.

## 2025-03-17 NOTE — TELEPHONE ENCOUNTER
SHERWIN TURPIN to 's nurse, Gricelda, on 3/17/2025    Preliminary findings:    1 episode of -207 with an avg rate of 178 bpm    Sinus Rhythm  with an avg rate of 81 bpm    Rare Supraventricular ectopics; no noted couplets    Rare Ventricular ectopics; no noted triplets    34 symptomatic patient events:  SR   SVE(s)      To Patient- These findings are preliminary and HAVE NOT yet been reviewed by your provider. Please allow our team time to review these findings and someone from our office will contact you if any follow up is necessary.

## 2025-03-17 NOTE — TELEPHONE ENCOUNTER
S/w patient using  line regarding heart monitor results and JA recommendations. Patient verbalized understanding.

## 2025-03-25 ENCOUNTER — OFFICE VISIT (OUTPATIENT)
Dept: CARDIOLOGY | Facility: MEDICAL CENTER | Age: 40
End: 2025-03-25
Attending: PHYSICIAN ASSISTANT
Payer: COMMERCIAL

## 2025-03-25 VITALS
SYSTOLIC BLOOD PRESSURE: 92 MMHG | HEIGHT: 61 IN | OXYGEN SATURATION: 97 % | RESPIRATION RATE: 16 BRPM | WEIGHT: 138 LBS | HEART RATE: 90 BPM | BODY MASS INDEX: 26.06 KG/M2 | DIASTOLIC BLOOD PRESSURE: 58 MMHG

## 2025-03-25 DIAGNOSIS — I95.0 IDIOPATHIC HYPOTENSION: ICD-10-CM

## 2025-03-25 DIAGNOSIS — R55 SYNCOPE AND COLLAPSE: ICD-10-CM

## 2025-03-25 DIAGNOSIS — R07.89 OTHER CHEST PAIN: ICD-10-CM

## 2025-03-25 PROCEDURE — 99211 OFF/OP EST MAY X REQ PHY/QHP: CPT | Performed by: PHYSICIAN ASSISTANT

## 2025-03-25 PROCEDURE — 3078F DIAST BP <80 MM HG: CPT | Performed by: PHYSICIAN ASSISTANT

## 2025-03-25 PROCEDURE — 99212 OFFICE O/P EST SF 10 MIN: CPT | Performed by: PHYSICIAN ASSISTANT

## 2025-03-25 PROCEDURE — 3074F SYST BP LT 130 MM HG: CPT | Performed by: PHYSICIAN ASSISTANT

## 2025-03-25 PROCEDURE — 99214 OFFICE O/P EST MOD 30 MIN: CPT | Performed by: PHYSICIAN ASSISTANT

## 2025-03-25 RX ORDER — TENAPANOR HYDROCHLORIDE 53.2 MG/1
50 TABLET ORAL DAILY
COMMUNITY

## 2025-03-25 ASSESSMENT — ENCOUNTER SYMPTOMS
PND: 0
SHORTNESS OF BREATH: 0
DIZZINESS: 1
PALPITATIONS: 0
LOSS OF CONSCIOUSNESS: 0
FALLS: 0
NAUSEA: 0
ORTHOPNEA: 0

## 2025-03-25 ASSESSMENT — FIBROSIS 4 INDEX: FIB4 SCORE: 0.9

## 2025-03-25 NOTE — PROGRESS NOTES
Chief Complaint   Patient presents with    Follow-Up     FV DX:Idiopathic hypotension            Subjective:   Poly Soto is a 40 y.o. female who presents today for follow-up.     She presents with her  Francis and daughter who agrees to translate.    Current medical problems include GERD, cholecystitis status postcholecystectomy complicated by bile leak requiring biliary stent placed in November 2023 with stent removed in 2024.  She was referred to cardiology after presenting to the emergency department in January with dizziness and chest burning.  At our initial consultation she reported multiple episodes of syncope and near syncope with a combination of room spinning sensation and lightheadedness.  She completed a 2-week event monitor that showed no sustained arrhythmias, no heart block, predominantly sinus rhythm with an average heartbeat of 81 bpm.    I ordered lab work including a TSH which was within normal limits.  She had an a.m. cortisol checked which was 13.5.    Poly has not had any further syncopal episodes since the emergency department visit.  She does have occasional dizziness that usually occurs when she is walking.  She is able to sit rest let the symptoms pass and continues with her day.  Her blood pressures remain on the lower side despite no antihypertensives.  She drinks water throughout the day and eats salt in her meals although does not add additional salt.    Additionally she describes brief random episodes of chest discomfort lasting about less than 1 minute.  There is no pattern and no association with exertion or at rest.    She endorses chest burning ongoing for the last 2 years and is a patient of Dr. Davis, recently tried to have a pH monitor placed that this was not successful.     She denies other medical problems. No cardiac history. Denies family history of cardiac disease. She does not smoke or drink alcohol or use illicit drugs.     Past Medical History:  "  Diagnosis Date    Cholecystitis     Gynecological disorder     ovarian cysts    Heart burn     Indigestion     Pain 03/14/2024    bulateral upper quadrant pain.         Family History   Problem Relation Age of Onset    No Known Problems Mother     No Known Problems Father          Social History     Tobacco Use    Smoking status: Never    Smokeless tobacco: Never   Vaping Use    Vaping status: Never Used   Substance Use Topics    Alcohol use: Never    Drug use: Never         No Known Allergies      Current Outpatient Medications   Medication Sig    Tenapanor HCl (IBSRELA) 50 MG Tab Take 50 Tablets by mouth every day.    omeprazole (PRILOSEC) 40 MG delayed-release capsule Take 40 mg by mouth every day.    famotidine (PEPCID) 40 MG Tab Take 40 mg by mouth every day.    therapeutic multivitamin-minerals (THERAGRAN-M) Tab Take 1 Tablet by mouth every day.    Relugolix-Estradiol-Norethind (MYFEMBREE) 40-1-0.5 MG Tab Take 1 Tablet by mouth every day. (Patient not taking: Reported on 3/14/2024)         Review of Systems   Constitutional:  Negative for malaise/fatigue.   Respiratory:  Negative for shortness of breath.    Cardiovascular:  Positive for chest pain. Negative for palpitations, orthopnea, leg swelling and PND.   Gastrointestinal:  Negative for nausea.   Musculoskeletal:  Negative for falls.   Neurological:  Positive for dizziness. Negative for loss of consciousness.          Objective:   BP 92/58 (BP Location: Left arm, Patient Position: Sitting, BP Cuff Size: Adult)   Pulse 90   Resp 16   Ht 1.549 m (5' 1\")   Wt 62.6 kg (138 lb)   SpO2 97%  Body mass index is 26.07 kg/m².        Supine BP : 90/62  Sitting: BP 92/62     Cardiac Imaging and Procedures Review:    EKG 1/4/25 : Sinus rhythm      Physical Exam  Vitals reviewed.   HENT:      Head: Normocephalic and atraumatic.   Cardiovascular:      Rate and Rhythm: Normal rate and regular rhythm.   Pulmonary:      Effort: Pulmonary effort is normal. No " respiratory distress.      Breath sounds: No rales.   Musculoskeletal:      Right lower leg: No edema.      Left lower leg: No edema.   Skin:     General: Skin is warm and dry.   Neurological:      General: No focal deficit present.      Mental Status: She is alert.      Gait: Gait normal.   Psychiatric:         Judgment: Judgment normal.            Assessment:     1. Idiopathic hypotension        2. Syncope and collapse        3. Other chest pain             Medical Decision Making:  Today's Assessment / Plan:   Syncope  Hypotension  - Based on her history and prodrome this is most likely neurocardiogenic syncope elicited by her poorly controlled acid reflux. She is not orthostatic in the office.  Her a.m. cortisol and TSH are normal.  She does not have anemia.  She wore a 2-week event monitor that showed no sustained arrhythmias.  Her symptoms were associated with rare PACs or sinus rhythm.  She is scheduled for an echocardiogram in 2 weeks.  If this shows a structurally normal heart without concerning effusions would continue working with GI and can follow-up with cardiology as needed.   -Encouraged to hydrate as she can tolerate and up to 3 g of salt per day    Atypical chest pain  -Describes noncardiac chest pain lasting 1 minute or less with no pattern or association with exertion.  I encouraged her to continue low to moderate intensity exercise with a goal of 30 minutes/day.        No follow-ups on file.  Sooner if problems.

## 2025-04-11 ENCOUNTER — APPOINTMENT (OUTPATIENT)
Dept: CARDIOLOGY | Facility: MEDICAL CENTER | Age: 40
End: 2025-04-11
Attending: PHYSICIAN ASSISTANT
Payer: COMMERCIAL

## 2025-04-15 ENCOUNTER — TELEPHONE (OUTPATIENT)
Dept: CARDIOLOGY | Facility: MEDICAL CENTER | Age: 40
End: 2025-04-15
Payer: COMMERCIAL

## 2025-04-15 NOTE — LETTER
PROCEDURE/SURGERY CLEARANCE FORM      Encounter Date: 4/15/2025    Patient: Poly Soto  YOB: 1985    CARDIOLOGIST:  Brisa Lin     REFERRING DOCTOR:  No ref. provider found      The following procedure/surgery: EGD with deep (propofol) sedation                                            Additional comments:   May proceed with EGD      PROCEDURE/SURGERY CLEARANCE FORM    Date: 4/18/2025   Patient Name: Poly Soto    Dear Surgeon or Proceduralist,      Thank you for your request for cardiac stratification of our mutual patient Poly Soto 1985. We have reviewed their [facility] records; and to the best of our understanding this patient has not had stenting, ablation, watchman, cardiothoracic surgery or hospitalization for cardiovascular reasons in the past 6 months.  Poly oSto has been seen within the past 15 months and is considered to have non-modifiable cardiac risk for this low-risk procedure/surgery. They may proceed from a cardiovascular standpoint and may hold their antiplatelet/anticoagulation as briefly as possible. Please have patient resume this medication when hemodynamically stable to do so.     Aspirin or Prasugrel   - hold 7 days prior to procedure/surgery, resume when hemodynamically stable      Clopidrogrel or Ticagrelor  - hold 7 days for all neurological procedures, hold 5 days prior to all other procedure/surgery,  resume when hemodynamically stable     Warfarin - hold 7 days for all neurological procedures, hold 5 days prior to all other procedure/surgery and coordinate with [facility] ([phone]) INR testing and dose management.      Pradaxa/Xarelto/Eliquis/Savesya - hold 1 day prior to procedure for low bleeding risk procedure, 2 days for high bleeding risk procedure, or consider holding 3 days or longer for patients with reduced kidney function (CrCl <30mL/min) or spinal/cranial surgeries/procedures.      If they  have a mechanical heart valve, please coordinate with [facility] ([phone]) the proper management of their anticoagulant in the periprocedural or perioperative period.      Some patients have higher risk for cardiovascular complications or holding medication. If our patient has had prior complications of holding antiplatelet or anticoagulants in the past and we have seen them after these events, we have addressed these concerns with the patient. They are at an unknown degree of increased risk for recurrent complication.  You may hold anticoagulation/antiplatelets for the procedure or surgery if the benefits of the procedure or surgery outweigh this nonmodifiable risk.      If Poly Soto 1985 has new symptoms of heart failure decompensation, unstable arrythmia, or angina please reach out and we will assess the patient.      If you have other patient-specific concerns, please feel free to reach out to the patient's cardiologist directly at [phone].     Thank you,       [facility]                Electronically signed           MD Signature   Brisa Lin

## 2025-04-17 NOTE — TELEPHONE ENCOUNTER
Last OV: 3.25.2025  Proposed Surgery: EGD with deep (propofol) sedation   Surgery Date: 6.20.2025  Requesting Office Name: [facility]   Fax Number: [phone]  Preference of Location (default is surgery center unless specified by Cardiologist or [facility])  Prior Clearance Addressed: No    Is this a general clearance? YES   Anticoags/Antiplatelets: Other none   Anticoags/Antiplatelet managed by Cardiology? YES    Outstanding Cardiac Imaging : Yes  Echo.   Clearance to provider to review  Ablation, Cardioversion, Stent, Cardiac Devices, Catheterization, Watchman: No  TAVR/Valve, Mitral Clip, Watchman (including open heart),: N/A   Recent Cardiac Hospitalization: No            When: N/A  History (cardiac history):   Past Medical History:   Diagnosis Date    Cholecystitis     Gynecological disorder     ovarian cysts    Heart burn     Indigestion     Pain 03/14/2024    bulateral upper quadrant pain.           Is this a dental clearance? NO  Ablation, Cardioversion, Watchman, Stents, Cath, Devices within the last 3 months? No   If yes- Send dental wait letter, do not forward to provider for review.     TAVR / Valve, Mitral clip within the last 6 months? No  If yes- Send dental wait letter, do not forward to provider for review.     If completing a general clearance, continue per protocol.           Surgical Clearance Letter Sent: No Provider to advise.   **Scan clearance request letter into [facility].**    Yes - the patient is able to be screened

## (undated) DEVICE — DEVICE BIOPSY RX BILIARY SYSTEM CAP (10EA/BX)

## (undated) DEVICE — CANNULA W/SEAL 5X100 Z-THRE - ADED KII (12/BX)

## (undated) DEVICE — GLOVE BIOGEL PI INDICATOR SZ 6.5 SURGICAL PF LF - (50/BX 4BX/CA)

## (undated) DEVICE — DERMABOND ADVANCED - (12EA/BX)

## (undated) DEVICE — CONTAINER, SPECIMEN, STERILE

## (undated) DEVICE — KIT ANESTHESIA W/CIRCUIT & 3/LT BAG W/FILTER (20EA/CA)

## (undated) DEVICE — COVER LIGHT HANDLE ALC PLUS DISP (18EA/BX)

## (undated) DEVICE — TOWEL STOP TIMEOUT SAFETY FLAG (40EA/CA)

## (undated) DEVICE — PACK LAP CHOLE OR - (2EA/CA)

## (undated) DEVICE — INTRODUCER STENT NAVIFLEX 10FR

## (undated) DEVICE — LACTATED RINGERS INJ 1000 ML - (14EA/CA 60CA/PF)

## (undated) DEVICE — NEEDLE INSFL 120MM 14GA VRRS - (20/BX)

## (undated) DEVICE — TROCAR LAPSCP 100MM 12MM NTHRD - (6/BX)

## (undated) DEVICE — MASK WITH FACE SHIELD (25/BX 4BX/CA)

## (undated) DEVICE — CANISTER SUCTION RIGID RED 1500CC (40EA/CA)

## (undated) DEVICE — BLOCK BITE MAXI DENTAL RETENTION RIM (100EA/BX)

## (undated) DEVICE — SYRINGE SAFETY 10 ML 18 GA X 1 1/2 BLUNT LL (100/BX 4BX/CA)

## (undated) DEVICE — SYRINGE DISP. 60 CC LL - (30/BX, 12BX/CA)**WHEN THESE ARE GONE ORDER #500206**

## (undated) DEVICE — KIT CUSTOM PROCEDURE SINGLE FOR ENDO  (15/CA)

## (undated) DEVICE — SYRINGE 10 ML CONTROL LL (25EA/BX 4BX/CA)

## (undated) DEVICE — GLOVE SZ 6 BIOGEL PI MICRO - PF LF (50PR/BX 4BX/CA)

## (undated) DEVICE — MANIFOLD NEPTUNE 1 PORT (20/PK)

## (undated) DEVICE — SYRINGE 12 CC LUER TIP - (80/BX) OBSOLETE ITEM

## (undated) DEVICE — SODIUM CHL IRRIGATION 0.9% 1000ML (12EA/CA)

## (undated) DEVICE — GLOVE BIOGEL INDICATOR SZ 6.5 SURGICAL PF LTX - (50PR/BX 4BX/CA)

## (undated) DEVICE — SUTURE 4-0 MONOCRYL PLUS PS-2 - 27 INCH (36/BX)

## (undated) DEVICE — SYRINGE 30 ML LS (56/BX)

## (undated) DEVICE — CLIP MED LG INTNL HRZN TI ESCP - (20/BX)

## (undated) DEVICE — GOWN SURGEONS LARGE - (32/CA)

## (undated) DEVICE — ELECTRODE DUAL RETURN W/ CORD - (50/PK)

## (undated) DEVICE — AUTOCAP RX FOR EXALT SCOPES (10EA/BX)

## (undated) DEVICE — GLOVE BIOGEL SZ 8 SURGICAL PF LTX - (50PR/BX 4BX/CA)

## (undated) DEVICE — TROCAR Z THREAD 12 X 100 - BLADED (6/BX)

## (undated) DEVICE — SLEEVE, VASO, THIGH, MED

## (undated) DEVICE — TROCAR Z THREAD12MM OPTICAL - NON BLADED (6/BX)

## (undated) DEVICE — DUODENOSCOPE FLEXIBLE EXALT MODEL-D SINGLE USE (2EA/BX)

## (undated) DEVICE — TUBE E-T HI-LO CUFF 6.5MM (10EA/BX)

## (undated) DEVICE — JAGTOME RX 39 PRE-LOADED .025 X 260CM

## (undated) DEVICE — SUTURE 0 VICRYL PLUS CT-2 - 27 INCH (36/BX)

## (undated) DEVICE — SET TUBING PNEUMOCLEAR HIGH FLOW SMOKE EVACUATION (10EA/BX)

## (undated) DEVICE — SUCTION INSTRUMENT YANKAUER BULBOUS TIP W/O VENT (50EA/CA)

## (undated) DEVICE — TUBING CLEARLINK DUO-VENT - C-FLO (48EA/CA)

## (undated) DEVICE — TUBE SUCTION YANKAUER  1/4 X 6FT (20EA/CA)"

## (undated) DEVICE — CANISTER SUCTION 3000ML MECHANICAL FILTER AUTO SHUTOFF MEDI-VAC NONSTERILE LF DISP  (40EA/CA)

## (undated) DEVICE — SET LEADWIRE 5 LEAD BEDSIDE DISPOSABLE ECG (1SET OF 5/EA)

## (undated) DEVICE — CHLORAPREP 26 ML APPLICATOR - ORANGE TINT(25/CA)

## (undated) DEVICE — WATER IRRIGATION STERILE 1000ML (12EA/CA)

## (undated) DEVICE — EXTRACTOR PRO XL 9-12 MM ABOVE

## (undated) DEVICE — BUTTON ENDOSCOPY DISPOSABLE

## (undated) DEVICE — COVER ENDOSCOPE DISTAL SINGLE USE (20EA/BX)

## (undated) DEVICE — ANTI-FOG SOLUTION - 60BTL/CA

## (undated) DEVICE — SYRINGE SAFETY 5 ML 18 GA X 1-1/2 BLUNT LL (100/BX 4BX/CA)

## (undated) DEVICE — TUBE CONNECTING SUCTION - CLEAR PLASTIC STERILE 72 IN (50EA/CA)

## (undated) DEVICE — SYRINGE SAFETY 3 ML 18 GA X 1 1/2 BLUNT LL (100/BX 8BX/CA)

## (undated) DEVICE — BAG RETRIEVAL 10ML (10EA/BX)

## (undated) DEVICE — SET EXTENSION WITH 2 PORTS (48EA/CA) ***PART #2C8610 IS A SUBSTITUTE*****

## (undated) DEVICE — KIT  I.V. START (100EA/CA)

## (undated) DEVICE — GUIDE JAGWIRE 035 STRAIGHT (2EA/BX)

## (undated) DEVICE — SUTURE GENERAL

## (undated) DEVICE — MASK OXYGEN VNYL ADLT MED CONC WITH 7 FOOT TUBING  - (50EA/CA)

## (undated) DEVICE — SENSOR OXIMETER ADULT SPO2 RD SET (20EA/BX)

## (undated) DEVICE — ELECTRODE 5MM LHK LAPSCP STERILE DISP- MEGADYNE  (5/CA)

## (undated) DEVICE — SPONGE GAUZE NON-STERILE 4X4 - (2000/CA 10PK/CA)

## (undated) DEVICE — FORCEP RADIAL JAW 4 STANDARD CAPACITY W/NEEDLE 240CM (40EA/BX)

## (undated) DEVICE — FILM CASSETTE ENDO

## (undated) DEVICE — CAPTIVATOR II-15MM ROUND STIFF  (40/BX)

## (undated) DEVICE — TROCAR 5X100 BLADED Z-THREAD - KII (6/BX)

## (undated) DEVICE — BLOCK BITE ENDOSCOPIC 2809 - (100/BX) INTERMEDIATE

## (undated) DEVICE — COVER LIGHT HANDLE FLEXIBLE - SOFT (2EA/PK 80PK/CA)

## (undated) DEVICE — PORT AUXILLARY WATER (50EA/BX)

## (undated) DEVICE — SCISSORS 5MM CVD (6EA/BX)

## (undated) DEVICE — GOWN WARMING STANDARD FLEX - (30/CA)

## (undated) DEVICE — BALLOON RETRIEVAL EXTRACTOR PRO RX   9-12MM